# Patient Record
Sex: MALE | Race: WHITE | NOT HISPANIC OR LATINO | Employment: OTHER | URBAN - METROPOLITAN AREA
[De-identification: names, ages, dates, MRNs, and addresses within clinical notes are randomized per-mention and may not be internally consistent; named-entity substitution may affect disease eponyms.]

---

## 2017-01-04 DIAGNOSIS — F41.9 MILD ANXIETY: ICD-10-CM

## 2017-01-04 DIAGNOSIS — K21.9 GASTROESOPHAGEAL REFLUX DISEASE WITHOUT ESOPHAGITIS: ICD-10-CM

## 2017-01-06 RX ORDER — BUPROPION HYDROCHLORIDE 150 MG/1
TABLET, EXTENDED RELEASE ORAL
Qty: 180 TABLET | Refills: 0 | Status: SHIPPED | OUTPATIENT
Start: 2017-01-06 | End: 2017-04-04 | Stop reason: SDUPTHER

## 2017-01-06 RX ORDER — ESOMEPRAZOLE MAGNESIUM 20 MG
CAPSULE,DELAYED RELEASE (ENTERIC COATED) ORAL
Qty: 90 CAPSULE | Refills: 0 | Status: SHIPPED | OUTPATIENT
Start: 2017-01-06 | End: 2017-04-04 | Stop reason: SDUPTHER

## 2017-01-06 RX ORDER — AMITRIPTYLINE HYDROCHLORIDE 10 MG/1
TABLET, FILM COATED ORAL
Qty: 90 TABLET | Refills: 0 | Status: SHIPPED | OUTPATIENT
Start: 2017-01-06 | End: 2017-04-04 | Stop reason: SDUPTHER

## 2017-04-04 DIAGNOSIS — K21.9 GASTROESOPHAGEAL REFLUX DISEASE WITHOUT ESOPHAGITIS: ICD-10-CM

## 2017-04-04 DIAGNOSIS — F41.9 MILD ANXIETY: ICD-10-CM

## 2017-04-05 RX ORDER — ESOMEPRAZOLE MAGNESIUM 20 MG
CAPSULE,DELAYED RELEASE (ENTERIC COATED) ORAL
Qty: 90 CAPSULE | Refills: 3 | Status: SHIPPED | OUTPATIENT
Start: 2017-04-05

## 2017-04-05 RX ORDER — AMITRIPTYLINE HYDROCHLORIDE 10 MG/1
TABLET, FILM COATED ORAL
Qty: 90 TABLET | Refills: 3 | Status: SHIPPED | OUTPATIENT
Start: 2017-04-05 | End: 2023-10-05

## 2017-04-05 RX ORDER — BUPROPION HYDROCHLORIDE 150 MG/1
TABLET, EXTENDED RELEASE ORAL
Qty: 180 TABLET | Refills: 3 | Status: SHIPPED | OUTPATIENT
Start: 2017-04-05 | End: 2017-11-15

## 2017-11-15 ENCOUNTER — OFFICE VISIT (OUTPATIENT)
Dept: FAMILY MEDICINE | Facility: CLINIC | Age: 47
End: 2017-11-15
Payer: OTHER GOVERNMENT

## 2017-11-15 VITALS
OXYGEN SATURATION: 96 % | TEMPERATURE: 98 F | RESPIRATION RATE: 16 BRPM | HEIGHT: 72 IN | HEART RATE: 78 BPM | WEIGHT: 214.5 LBS | DIASTOLIC BLOOD PRESSURE: 74 MMHG | BODY MASS INDEX: 29.05 KG/M2 | SYSTOLIC BLOOD PRESSURE: 112 MMHG

## 2017-11-15 DIAGNOSIS — J30.2 ACUTE SEASONAL ALLERGIC RHINITIS, UNSPECIFIED TRIGGER: Primary | ICD-10-CM

## 2017-11-15 DIAGNOSIS — R05.9 COUGH: ICD-10-CM

## 2017-11-15 PROCEDURE — 99213 OFFICE O/P EST LOW 20 MIN: CPT | Mod: PBBFAC,PO | Performed by: NURSE PRACTITIONER

## 2017-11-15 PROCEDURE — 99999 PR PBB SHADOW E&M-EST. PATIENT-LVL III: CPT | Mod: PBBFAC,,, | Performed by: NURSE PRACTITIONER

## 2017-11-15 PROCEDURE — 99214 OFFICE O/P EST MOD 30 MIN: CPT | Mod: S$PBB,,, | Performed by: NURSE PRACTITIONER

## 2017-11-15 RX ORDER — FLUTICASONE PROPIONATE 50 MCG
1 SPRAY, SUSPENSION (ML) NASAL DAILY
Qty: 1 BOTTLE | Refills: 4 | Status: SHIPPED | OUTPATIENT
Start: 2017-11-15 | End: 2022-04-21

## 2017-11-15 RX ORDER — BENZONATATE 200 MG/1
200 CAPSULE ORAL 3 TIMES DAILY PRN
Qty: 30 CAPSULE | Refills: 0 | Status: SHIPPED | OUTPATIENT
Start: 2017-11-15 | End: 2017-11-25

## 2017-11-15 RX ORDER — BENZONATATE 200 MG/1
200 CAPSULE ORAL 3 TIMES DAILY PRN
Qty: 30 CAPSULE | Refills: 0 | Status: SHIPPED | OUTPATIENT
Start: 2017-11-15 | End: 2017-11-15 | Stop reason: SDUPTHER

## 2017-11-15 NOTE — PROGRESS NOTES
Subjective:       Patient ID: Raciel White is a 47 y.o. male.    Chief Complaint: Nasal Congestion; Chest Congestion; Sore Throat; Cough (productive with green sputum); and Ear Fullness (bilateral)    Mr. White is a new patient to me. He presents today with complaints of nasal congestion and cough.    Cough   This is a new problem. The current episode started in the past 7 days. The problem has been unchanged. The cough is productive of sputum. Associated symptoms include ear congestion, nasal congestion, postnasal drip and a sore throat. Pertinent negatives include no chest pain, ear pain, eye redness, fever, headaches, rash or shortness of breath. He has tried OTC cough suppressant for the symptoms. The treatment provided no relief.      Vitals:    11/15/17 0937   BP: 112/74   Pulse: 78   Resp: 16   Temp: 98.4 °F (36.9 °C)     Review of Systems   Constitutional: Negative.  Negative for diaphoresis and fever.   HENT: Positive for congestion, postnasal drip and sore throat. Negative for ear pain, facial swelling and trouble swallowing.    Eyes: Negative.  Negative for discharge and redness.   Respiratory: Positive for cough. Negative for shortness of breath.    Cardiovascular: Negative.  Negative for chest pain and palpitations.   Gastrointestinal: Negative.  Negative for nausea and vomiting.   Musculoskeletal: Negative.  Negative for back pain and neck pain.   Skin: Negative.  Negative for rash and wound.   Neurological: Negative.  Negative for dizziness and headaches.   Hematological: Negative.    Psychiatric/Behavioral: Negative.  Negative for confusion. The patient is not nervous/anxious.        Past Medical History:   Diagnosis Date    GERD (gastroesophageal reflux disease)     Insomnia     Sleep apnea     cpap     Objective:      Physical Exam   Constitutional: He is oriented to person, place, and time. He does not have a sickly appearance. No distress.   HENT:   Head: Normocephalic.   Right Ear: Hearing,  tympanic membrane, external ear and ear canal normal.   Left Ear: Hearing, tympanic membrane, external ear and ear canal normal.   Nose: Nose normal. Right sinus exhibits no maxillary sinus tenderness and no frontal sinus tenderness. Left sinus exhibits no maxillary sinus tenderness and no frontal sinus tenderness.   Mouth/Throat:       Eyes: Conjunctivae and lids are normal.   Neck: Trachea normal. No JVD present.   Cardiovascular: Normal rate, regular rhythm, S1 normal, S2 normal and normal heart sounds.    Pulmonary/Chest: Effort normal and breath sounds normal. He exhibits no tenderness.   Abdominal: Normal appearance. He exhibits no distension.   Musculoskeletal: Normal range of motion. He exhibits no edema or deformity.   Neurological: He is alert and oriented to person, place, and time.   Skin: Skin is warm and dry. He is not diaphoretic. No pallor.   Psychiatric: He has a normal mood and affect. His speech is normal and behavior is normal. Judgment and thought content normal. Cognition and memory are normal.   Nursing note and vitals reviewed.      Assessment:       1. Acute seasonal allergic rhinitis, unspecified trigger    2. Cough        Plan:       Acute seasonal allergic rhinitis, unspecified trigger  -     fluticasone (FLONASE) 50 mcg/actuation nasal spray; 1 spray by Each Nare route once daily.  Dispense: 1 Bottle; Refill: 4  - otc antihistamine daily    Cough  -     benzonatate (TESSALON) 200 MG capsule; Take 1 capsule (200 mg total) by mouth 3 (three) times daily as needed for Cough.  Dispense: 30 capsule; Refill: 0        Return if symptoms worsen or fail to improve.

## 2017-11-15 NOTE — PATIENT INSTRUCTIONS
FLONASE (fluticasone) or generic equivalent   antihistamine (zyrtec, Claritin or allegra)     You can try Delsym cough suppressant      Understanding the Cold Virus  Colds are the most common illness that people get. Most adults get 2 or 3 colds per year, and most children get 5 to 7 colds per year. Colds may be caused by over 200 types of viruses. The most common of these are rhinoviruses (rhino refers to the nose).  What causes a cold virus?  All colds start with infection by a virus. You can be infected by more than one cold virus at a time. Infection with cold viruses happens when:  · You breathe in a virus from the air. This can happen when someone with a cold sneezes or coughs near you.  · You touch your eyes, nose, or mouth when your hand has a cold virus on it. This can happen if you touch an object that has the cold virus on it.  What are the symptoms of a cold virus?  Almost all colds involve a stuffy nose. Other common symptoms include:  · Runny nose  · Sneezing  · Sore throat  · Headache  · Cough  How is a cold treated?  Colds usually last 5 to 10 days. Treatment focuses on relieving symptoms. Treatments may include:  · Decongestant medicines. Several types of decongestants are available without prescription. These may help reduce stuffy or runny nose symptoms.  · Prescription or over-the-counter nasal sprays. These may help reduce nasal symptoms, including stuffiness.  · Prescription or over-the-counter pain medicines. These can help with headaches and sore throat.  · Self-care. This includes extra rest, using humidifiers, and drinking more fluids. These help you feel better while you are getting over a cold.  Antibiotics are not helpful for a cold. They do not make a cold shorter or relieve symptoms. Taking antibiotics when you dont need them can make them work less well when you need them for another illness.  Follow all directions for using medicines, especially when giving them to  children. Contact your healthcare provider if you have any questions about using cold medicines safely.  Can a cold be prevented?  You can help reduce the spread of cold viruses. This can help both you and others avoid getting colds. Follow these tips:  · Wash your hands well anytime you may have come into contact with cold viruses. Wash your hands for at least 20 seconds. When you cant wash with soap and water, use an alcohol-based hand .  · Dont touch your nose, eyes, or mouth, especially after touching something that may have a cold virus on it.  · Cover your mouth and nose when you cough or sneeze. Throw away tissues after using them.  · Disinfect things you touch often, such as phones and keyboards.    · Stay home when you have a cold.  What are the possible complications of a cold virus?  Colds usually go away by themselves. But its not unusual to get another type of infection while you have a cold. These can include:  · Sinus infection  · Lung infection, such as bronchitis or pneumonia  · Ear infection  If you have asthma or chronic bronchitis, a cold can make your condition worse.     When should I call my healthcare provider?  Call your healthcare provider right away if you have any of these:  · Fever of 100.4°F (38°C) or higher, or as directed  · Cough, chest pain, or shortness of breath that gets worse  · Symptoms dont get better or get worse after about 10 days  · Headache, sleepiness, or confusion that gets worse   Date Last Reviewed: 3/28/2016  © 3618-5834 [employer], [employer] ([employer]). [address] [place] [address], [place] [address]. All rights reserved. This information is not intended as a substitute for professional medical care. Always follow your healthcare professional's instructions.

## 2017-12-19 ENCOUNTER — TELEPHONE (OUTPATIENT)
Dept: OPTOMETRY | Facility: CLINIC | Age: 47
End: 2017-12-19

## 2017-12-19 NOTE — TELEPHONE ENCOUNTER
Returned pt call. Left VM that he can get records at [email or web address] or by contacting the     ATTN: Release of Information [facility] [address] [place] [address]   Phone: [phone] Fax: [phone]     ----- Message from Angy Pratt sent at 12/19/2017 10:55 AM CST -----  Contact: Patient  States that he would like a copy of the results of his eye exam which was done back 11/04/2015.  Can you please call him back at [phone].  Thank you

## 2017-12-20 ENCOUNTER — OFFICE VISIT (OUTPATIENT)
Dept: OPTOMETRY | Facility: CLINIC | Age: 47
End: 2017-12-20
Payer: OTHER GOVERNMENT

## 2017-12-20 DIAGNOSIS — Z01.00 EXAMINATION OF EYES AND VISION: Primary | ICD-10-CM

## 2017-12-20 DIAGNOSIS — H52.4 MYOPIA WITH ASTIGMATISM AND PRESBYOPIA, BILATERAL: ICD-10-CM

## 2017-12-20 DIAGNOSIS — H52.203 MYOPIA WITH ASTIGMATISM AND PRESBYOPIA, BILATERAL: ICD-10-CM

## 2017-12-20 DIAGNOSIS — H52.13 MYOPIA WITH ASTIGMATISM AND PRESBYOPIA, BILATERAL: ICD-10-CM

## 2017-12-20 DIAGNOSIS — H43.393 VITREOUS FLOATERS, BILATERAL: ICD-10-CM

## 2017-12-20 PROCEDURE — 92014 COMPRE OPH EXAM EST PT 1/>: CPT | Mod: S$PBB,,, | Performed by: OPTOMETRIST

## 2017-12-20 PROCEDURE — 92015 DETERMINE REFRACTIVE STATE: CPT | Mod: ,,, | Performed by: OPTOMETRIST

## 2017-12-20 PROCEDURE — 99999 PR PBB SHADOW E&M-EST. PATIENT-LVL III: CPT | Mod: PBBFAC,,, | Performed by: OPTOMETRIST

## 2017-12-20 PROCEDURE — 99213 OFFICE O/P EST LOW 20 MIN: CPT | Mod: PBBFAC,PO | Performed by: OPTOMETRIST

## 2017-12-20 NOTE — PROGRESS NOTES
HPI     Presenting Complaint: Pt her today for yearly ocular health check, DLE:   11/2015  Pt states distance vision has been stable. Pt has noticed over th elast   year near vision has been getting worse.     Ophthalmic medication / drops:None    (-) headaches  (-) diplopia   (-) flashes / (-) floaters      Last edited by Jacobo Rowe on 12/20/2017  1:45 PM. (History)        ROS     Positive for: Eyes    Negative for: Constitutional, Gastrointestinal, Neurological, Skin,   Genitourinary, Musculoskeletal, HENT, Endocrine, Cardiovascular,   Respiratory, Psychiatric, Allergic/Imm, Heme/Lymph    Last edited by CARLOZ Matthews, OD on 12/20/2017  1:52 PM. (History)        Assessment /Plan     For exam results, see Encounter Report.    Examination of eyes and vision    Vitreous floaters, bilateral    Myopia with astigmatism and presbyopia, bilateral      Ocular health wnl, OU  Mild floaters OU, RD precautions given  Updated specs rx, gave copy--discussed PAL vs FT    Discussed and educated patient on current findings /plan.  RTC 1 year, prn if any changes / issues

## 2018-06-08 DIAGNOSIS — F41.9 MILD ANXIETY: ICD-10-CM

## 2018-06-08 RX ORDER — AMITRIPTYLINE HYDROCHLORIDE 10 MG/1
TABLET, FILM COATED ORAL
Qty: 90 TABLET | Refills: 3 | OUTPATIENT
Start: 2018-06-08

## 2018-06-08 RX ORDER — BUPROPION HYDROCHLORIDE 150 MG/1
TABLET, EXTENDED RELEASE ORAL
Qty: 180 TABLET | Refills: 3 | OUTPATIENT
Start: 2018-06-08

## 2018-06-26 ENCOUNTER — TELEPHONE (OUTPATIENT)
Dept: FAMILY MEDICINE | Facility: CLINIC | Age: 48
End: 2018-06-26

## 2018-06-26 DIAGNOSIS — G47.33 OSA (OBSTRUCTIVE SLEEP APNEA): Primary | ICD-10-CM

## 2018-06-26 NOTE — TELEPHONE ENCOUNTER
----- Message from Liliya Orellana sent at 6/26/2018  8:44 AM CDT -----  Contact: Patient  Type: Needs Medical Advice    Who Called:  Patient  Symptoms (please be specific): na   How long has patient had these symptoms: na  Pharmacy name and phone #:  [facility]  Best Call Back Number:   Additional Information: Calling to request a referral to see an ENT Doctor. The patient would like for the Nurse to call him back to schedule the ENT appt. Please advise.

## 2018-06-26 NOTE — TELEPHONE ENCOUNTER
Pt would like referral to ENT.  He states he saw sleep clinic and was given a cpap but he doesn't feel this is working and he has researched that the next step is a prosthesis device which is to be done with ENT.

## 2018-06-29 ENCOUNTER — OFFICE VISIT (OUTPATIENT)
Dept: OTOLARYNGOLOGY | Facility: CLINIC | Age: 48
End: 2018-06-29
Payer: OTHER GOVERNMENT

## 2018-06-29 VITALS — WEIGHT: 207.44 LBS | HEIGHT: 72 IN | BODY MASS INDEX: 28.1 KG/M2

## 2018-06-29 DIAGNOSIS — G47.33 OSA (OBSTRUCTIVE SLEEP APNEA): Primary | ICD-10-CM

## 2018-06-29 PROCEDURE — 99204 OFFICE O/P NEW MOD 45 MIN: CPT | Mod: S$PBB,,, | Performed by: OTOLARYNGOLOGY

## 2018-06-29 PROCEDURE — 99999 PR PBB SHADOW E&M-EST. PATIENT-LVL III: CPT | Mod: PBBFAC,,, | Performed by: OTOLARYNGOLOGY

## 2018-06-29 PROCEDURE — 99213 OFFICE O/P EST LOW 20 MIN: CPT | Mod: PBBFAC,PO | Performed by: OTOLARYNGOLOGY

## 2018-06-29 NOTE — LETTER
June 30, 2018      Camron Wong MD  [address] [facility] [address] [place] [address]           [place] [facility]  [address] [facility] [address] [place] [address]  Phone: [phone]  Fax: [phone]          Patient: Raciel White   MR Number: [id number]   YOB: 1970   Date of Visit: 6/29/2018       Dear Dr. Camron Wong:    Thank you for referring Raciel White to me for evaluation. Attached you will find relevant portions of my assessment and plan of care.    If you have questions, please do not hesitate to call me. I look forward to following Raciel White along with you.    Sincerely,    Brandin Rosen MD    Enclosure  CC:  No Recipients    If you would like to receive this communication electronically, please contact [email or web address] or [phone] to request more information on [facility] access.    For providers and/or their staff who would like to refer a patient to [facility], please contact us through our one-stop-shop provider referral line, [facility], at [phone].    If you feel you have received this communication in error or would no longer like to receive these types of communications, please e-mail [email or web address]

## 2018-06-29 NOTE — PROGRESS NOTES
Subjective:       Patient ID: Raciel White is a 47 y.o. male.    Chief Complaint: discuss sleep apnea surgery    Raciel is here for ROMA issues and intolerance of CPAP. Symptoms have been present for many years. He was followed by Dr. Yo and this was who did his PSG. He is intolerant of his CPAP - he has tried different masks without improvement. His Snoring is his biggest complaint and this is affecting he and his partner's sleep quality. He has tried OTC mouth devices but this has resulted in change to his dentition.   He had nasal surgery in 2000 with minimal improvement to sleep. He has allergies, but otherwise breathing OK through his nose. He has not changed with regards to his weight.   Last PSG 2009, results showed mixed sleep apnea with a fairly large central? Component according to interpretation.   BMI 28       History   Smoking Status    Current Every Day Smoker    Packs/day: 0.50    Types: Vaping with nicotine   Smokeless Tobacco    Former User     History   Alcohol Use    7.2 oz/week    12 Cans of beer per week     Comment: weekly          Review of Systems   Constitutional: Negative for activity change and appetite change.   Eyes: Negative for discharge.   Respiratory: Negative for difficulty breathing and wheezing   Cardiovascular: Negative for chest pain.   Gastrointestinal: Negative for abdominal distention and abdominal pain.   Endocrine: Negative for cold intolerance and heat intolerance.   Genitourinary: Negative for dysuria.   Musculoskeletal: Negative for gait problem and joint swelling.   Skin: Negative for color change and pallor.   Neurological: Negative for syncope and weakness.   Psychiatric/Behavioral: Negative for agitation and confusion.     Objective:        Constitutional:   He is oriented to person, place, and time. He appears well-developed and well-nourished. He appears alert. He is active. Normal speech.      Head:  Normocephalic and atraumatic. Head is without TMJ  tenderness. No scars. Salivary glands normal.  Facial strength is normal.    Normal neck circumference  Body mass index is 28.14 kg/m².      Ears:    Right Ear: No drainage or swelling. No middle ear effusion.   Left Ear: No drainage or swelling.  No middle ear effusion.     Nose:  Septal deviation (mild) present. No mucosal edema, rhinorrhea or sinus tenderness. No turbinate hypertrophy.      Mouth/Throat  Oropharynx clear and moist without lesions or asymmetry, normal uvula midline and mirror exam normal. Normal dentition. No uvula swelling, lacerations or trismus. No oropharyngeal exudate. Tonsillar erythema, tonsillar exudate.    Aidee tongue/palate I  Regressed tonsils, high/non-elongated palate  Strong gag  Normal tongue size with no retrognathia      Neck:  Full range of motion with neck supple and no adenopathy. Thyroid tenderness is present. No tracheal deviation, no edema, no erythema, normal range of motion, no stridor, no crepitus and no neck rigidity present. No thyroid mass present.     Cardiovascular:   Intact distal pulses and normal pulses.      Pulmonary/Chest:   Effort normal and breath sounds normal. No stridor.     Psychiatric:   His speech is normal and behavior is normal. His mood appears not anxious. His affect is not labile.     Neurological:   He is alert and oriented to person, place, and time. No sensory deficit.     Skin:   No abrasions, lacerations, lesions, or rashes. No abrasion and no bruising noted.         Tests / Results:   I personally reviewed the PSG from 2012 and my findings reveal:   Position: The patient spent the entire night in the supine positions.    Sleep Latency: The sleep onset latency after lights out was 3.5 minutes. The REM sleep latency from sleep onset was 45.0 minutes.    Total Sleep Time: The total time in bed was 444.0 minutes with a total sleep time of 321.0 minutes. Sleep efficiency was 72.3%.    All Night Sleep Architecture: During the entire night, the  patient had 33.0 minutes of REM for 10.3% of TST, 64.0 minutes of N1 for 19.9% of TST, 131.5 minutes of N 2 for 41.0% of TST, and  92.5 minutes of N 3 for 28.8% of TST. The arousal index was 33.3 per hour of sleep.    Respiratory: The patient was observed to have had a total number of 45 respiratory events with an RDI of 8.4 per hour. The REM RDI was 7.3. The normal RDI is less than 5 per hour. There  were 1 obstructive apnea, 0 mixed apneas, 42 central apneas, 2 hypopneas, and 68 RERA's. Snoring was eliminated with CPAP.    Oxygenation: The patient had an average oxygen saturation of 96.5%. The minimum oxygen level was 91.1%.    CPAP Pressures: CPAP was initiated after ROMA was noted on a pressure of 4.0 cmH2O.  CPAP pressure was increased to a maximum level of 14.0 cmH2O. CFlex was set to 3.  Humidity was set to 3.  Entire Night Cardiac and EMG Data  Cardiac: The average heart rate was 88.5 beats per minute.    Other/EMG: Patient had evidence of periodic leg movements during sleep.PLM index 20.4 per hour. PLM Arousal Index 3.4 per hour.  See prior Polysomnogram for other impressions and recommendations.    Impression:    Mild Obstructive Sleep Apnea Syndrome with hypersomnia and mild intermittent snoring with optimal therapeutic pressure of CPAP 10 cmwp/CFLEX 3/Humidity 3/ Small Nasal Cannulas  with good tolerance. (Significant Central apneas occures at 14 cmwp)  Periodic limb movements of sleep 20 per hour but with arousal index 3 per hour       Assessment:       1. ROMA (obstructive sleep apnea)          Plan:         Mild ROMA also with component of central apneas? Oxygen remained elevated during study. Failed CPAP. Primary complaint is snoring. In order to target appropriate management for patient, recommend updated PSG and counseling with the sleep disorders clinic.  FU 1 month and we will discuss options after new study has been obtained.

## 2018-08-13 ENCOUNTER — OFFICE VISIT (OUTPATIENT)
Dept: OTOLARYNGOLOGY | Facility: CLINIC | Age: 48
End: 2018-08-13
Payer: OTHER GOVERNMENT

## 2018-08-13 ENCOUNTER — TELEPHONE (OUTPATIENT)
Dept: OTOLARYNGOLOGY | Facility: CLINIC | Age: 48
End: 2018-08-13

## 2018-08-13 VITALS — BODY MASS INDEX: 28.48 KG/M2 | HEIGHT: 72 IN | WEIGHT: 210.31 LBS

## 2018-08-13 DIAGNOSIS — G47.33 OSA (OBSTRUCTIVE SLEEP APNEA): Primary | ICD-10-CM

## 2018-08-13 PROCEDURE — 99213 OFFICE O/P EST LOW 20 MIN: CPT | Mod: PBBFAC,PO | Performed by: OTOLARYNGOLOGY

## 2018-08-13 PROCEDURE — 99214 OFFICE O/P EST MOD 30 MIN: CPT | Mod: S$PBB,,, | Performed by: OTOLARYNGOLOGY

## 2018-08-13 PROCEDURE — 99999 PR PBB SHADOW E&M-EST. PATIENT-LVL III: CPT | Mod: PBBFAC,,, | Performed by: OTOLARYNGOLOGY

## 2018-08-13 NOTE — TELEPHONE ENCOUNTER
----- Message from Brandin Rosen MD sent at 8/13/2018 12:38 PM CDT -----  Please let me know where he decides to have surgery so I can put in the request and let the rep know.

## 2018-08-13 NOTE — Clinical Note
Please let me know where he decides to have surgery so I can put in the request and let the rep know.

## 2018-08-13 NOTE — PROGRESS NOTES
Subjective:       Patient ID: Raciel White is a 47 y.o. male.    Chief Complaint: Follow-up    Raciel is here for follow-up of severe ROMA. He had repeat sleep study since last visit which showed severe ROMA, AHI 34, desat to 81%. No other changes. He is intolerant of CPAP and has tried multiple masks. He has had nasal surgery without improvement in the past. He is interested in additional options for his sleep apnea.    He is using tobacco.     Review of Systems   Constitutional: Negative for activity change and appetite change.   Respiratory: Negative for difficulty breathing and wheezing   Cardiovascular: Negative for chest pain.      Objective:        Constitutional:   Vital signs are normal. He appears well-developed and well-nourished.     Head:  Normocephalic and atraumatic.     Ears:  Hearing normal to normal and whispered voice; external ear normal without scars, lesions, or masses; ear canal, tympanic membrane, and middle ear normal..     Nose:  Nose normal including turbinates, nasal mucosa, sinuses and nasal septum.     Mouth/Throat  Lips, teeth, and gums normal and oropharynx normal.   Pavon tongue I  Normal BOT size      Neck:  Neck normal without thyromegaly masses, asymmetry, normal tracheal structure, crepitus, and tenderness.         Tests / Results:  Reviewed PSG:  AHI 34  Desat to 81%  Slept for 7.5 hours    Assessment:       1. ROMA (obstructive sleep apnea)          Plan:       I have discussed the options for surgical treatment of ROMA  He is a little hesitant at this point about neurostimulation. He would like to proceed with Hyoid suspension.   Tobacco cessation.    I discussed risks including scar, persistent ROMA requiring CPAP, infection, seroma, hematoma, dysphagia, need for further procedures, or need for removal of hardware.

## 2018-08-16 NOTE — TELEPHONE ENCOUNTER
New date September 27 please let me know if the rep can do this date .  I will call the patient to after lined up with the rep.  Thanks Jessica

## 2018-09-05 ENCOUNTER — TELEPHONE (OUTPATIENT)
Dept: OTOLARYNGOLOGY | Facility: CLINIC | Age: 48
End: 2018-09-05

## 2018-09-05 NOTE — TELEPHONE ENCOUNTER
Spoke with pt that has questions about his upcoming surgery. Pt is asking if he can get his tonsils removed since Dr. Rosen will already be in there. Please call pt and discuss. Thanks

## 2018-09-05 NOTE — TELEPHONE ENCOUNTER
----- Message from Johana Curtis sent at 9/5/2018 11:38 AM CDT -----  Contact: patient  Type: Needs Medical Advice    Who Called:  Patient  Symptoms (please be specific):    How long has patient had these symptoms:    Pharmacy name and phone #:    Best Call Back Number: [phone]  Additional Information: requesting a call back have additional questions

## 2018-09-05 NOTE — TELEPHONE ENCOUNTER
All questions answered. Pt advised to call [facility] pre-admission for co-pay amount. Pt verbalized understanding.

## 2018-09-05 NOTE — TELEPHONE ENCOUNTER
----- Message from Juliet Rivera sent at 9/5/2018 10:29 AM CDT -----  Contact: self  Patient would like to discuss up coming appointment. Please call patient at [phone]. Thanks!

## 2018-09-25 ENCOUNTER — TELEPHONE (OUTPATIENT)
Dept: OTOLARYNGOLOGY | Facility: CLINIC | Age: 48
End: 2018-09-25

## 2018-09-25 NOTE — TELEPHONE ENCOUNTER
Light activity through the weekend. After 7-10 days can start increasing activity, but avoid heavy lifting for 10 days. Patient stated he understood.

## 2018-09-25 NOTE — TELEPHONE ENCOUNTER
----- Message from Jackie Curiel sent at 9/25/2018 10:59 AM CDT -----  Contact: self  Needs info on procedure that is scheduled. Please call back at [phone] (ssrq)

## 2018-09-25 NOTE — TELEPHONE ENCOUNTER
----- Message from Cory Farnsworth sent at 9/25/2018 12:54 PM CDT -----  Contact: self   Type:  Patient Returning Call    Who Called: patient   Who Left Message for Patient: Jessica   Does the patient know what this is regarding?: n/a  Best Call Back Number:  [phone] (home)

## 2018-09-26 ENCOUNTER — TELEPHONE (OUTPATIENT)
Dept: OTOLARYNGOLOGY | Facility: CLINIC | Age: 48
End: 2018-09-26

## 2018-09-26 NOTE — TELEPHONE ENCOUNTER
----- Message from Monse Kendall sent at 9/26/2018  1:52 PM CDT -----  Type:  Patient Returning Call    Who Called:  Patient  Who Left Message for Patient:  No  Does the patient know what this is regarding?:  No  Best Call Back Number:  [phone]

## 2018-10-01 ENCOUNTER — TELEPHONE (OUTPATIENT)
Dept: OTOLARYNGOLOGY | Facility: CLINIC | Age: 48
End: 2018-10-01

## 2018-10-08 ENCOUNTER — OFFICE VISIT (OUTPATIENT)
Dept: OTOLARYNGOLOGY | Facility: CLINIC | Age: 48
End: 2018-10-08
Payer: OTHER GOVERNMENT

## 2018-10-08 VITALS — BODY MASS INDEX: 28.9 KG/M2 | WEIGHT: 213.38 LBS | HEIGHT: 72 IN

## 2018-10-08 DIAGNOSIS — G47.33 OSA (OBSTRUCTIVE SLEEP APNEA): Primary | ICD-10-CM

## 2018-10-08 DIAGNOSIS — R06.83 SNORING: ICD-10-CM

## 2018-10-08 PROCEDURE — 99024 POSTOP FOLLOW-UP VISIT: CPT | Mod: ,,, | Performed by: OTOLARYNGOLOGY

## 2018-10-08 PROCEDURE — 99999 PR PBB SHADOW E&M-EST. PATIENT-LVL III: CPT | Mod: PBBFAC,,, | Performed by: OTOLARYNGOLOGY

## 2018-10-08 PROCEDURE — 99213 OFFICE O/P EST LOW 20 MIN: CPT | Mod: PBBFAC,PO | Performed by: OTOLARYNGOLOGY

## 2018-10-09 NOTE — PROGRESS NOTES
Raciel is here for a post-operative visit following hyoid suspension    More refreshing sleep  Apparent improvement in apneas, per wife  Sleeping more restfully  Still snoring, and this is very bothersome to he and his wife  Feels snoring is more pronounced when he is breathing through nose at night.   Has tried dental appliances in the past without improvement    Exam:  Alert, active, appropriate  Mild firmness of inferior incision c/w expected scarring, healing well overall  No palate or uvula redundancy   Septum straight, patent nasal cavity    Healing well  Doing well from ROMA perspective based on history. Would repeat PSG in 6-8 weeks.   He is still very bothered by snoring. His oral cavity, oropharynx exam is otherwise normal appearing. Discussed could be soft palate but not elongated.   He is very disturbed by snoring still as is his partner. I would prefer observation for now, but he would like to look into anything. Discussed sleep endoscopy to try and pinpoint area of snoring and he would like to move forward.

## 2018-10-16 ENCOUNTER — TELEPHONE (OUTPATIENT)
Dept: OTOLARYNGOLOGY | Facility: CLINIC | Age: 48
End: 2018-10-16

## 2018-10-16 NOTE — TELEPHONE ENCOUNTER
----- Message from Iwona Barbosa sent at 10/16/2018  1:58 PM CDT -----  Type: Needs Medical Advice    Who Called:  Patient   Symptoms (please be specific):  n/a  How long has patient had these symptoms:  n/a  Pharmacy name and phone #:  n/a  Best Call Back Number: patient hung up before phone number could be verified  Additional Information: contact patient to reschedule his 10/19/18 appointment    Thank you

## 2018-10-18 ENCOUNTER — ANESTHESIA EVENT (OUTPATIENT)
Dept: SURGERY | Facility: HOSPITAL | Age: 48
End: 2018-10-18
Payer: OTHER GOVERNMENT

## 2018-10-18 ENCOUNTER — TELEPHONE (OUTPATIENT)
Dept: OTOLARYNGOLOGY | Facility: CLINIC | Age: 48
End: 2018-10-18

## 2018-10-18 NOTE — TELEPHONE ENCOUNTER
----- Message from Brandin Rosen MD sent at 10/17/2018  2:23 PM CDT -----  Addison Schrader needs to move his surgery from Friday. He texted me. Can you call him to pick a new date. Thank you.

## 2018-10-19 ENCOUNTER — ANESTHESIA (OUTPATIENT)
Dept: SURGERY | Facility: HOSPITAL | Age: 48
End: 2018-10-19
Payer: OTHER GOVERNMENT

## 2018-10-30 RX ORDER — NAPROXEN SODIUM 220 MG
220 TABLET ORAL
COMMUNITY
End: 2020-09-14

## 2018-11-01 RX ORDER — LIDOCAINE HYDROCHLORIDE 10 MG/ML
1 INJECTION, SOLUTION EPIDURAL; INFILTRATION; INTRACAUDAL; PERINEURAL ONCE
Status: CANCELLED | OUTPATIENT
Start: 2018-11-01 | End: 2018-11-01

## 2018-11-02 ENCOUNTER — HOSPITAL ENCOUNTER (OUTPATIENT)
Facility: HOSPITAL | Age: 48
Discharge: HOME | End: 2018-11-02
Attending: OTOLARYNGOLOGY | Admitting: OTOLARYNGOLOGY
Payer: OTHER GOVERNMENT

## 2018-11-02 VITALS
HEART RATE: 70 BPM | DIASTOLIC BLOOD PRESSURE: 86 MMHG | BODY MASS INDEX: 27.09 KG/M2 | HEIGHT: 72 IN | TEMPERATURE: 98 F | RESPIRATION RATE: 16 BRPM | OXYGEN SATURATION: 98 % | SYSTOLIC BLOOD PRESSURE: 136 MMHG | WEIGHT: 200 LBS

## 2018-11-02 DIAGNOSIS — R06.83 SNORING: Primary | ICD-10-CM

## 2018-11-02 DIAGNOSIS — G47.33 OSA (OBSTRUCTIVE SLEEP APNEA): ICD-10-CM

## 2018-11-02 PROCEDURE — 25000003 PHARM REV CODE 250: Mod: PO | Performed by: NURSE ANESTHETIST, CERTIFIED REGISTERED

## 2018-11-02 PROCEDURE — D9220A PRA ANESTHESIA: Mod: ANES,,, | Performed by: ANESTHESIOLOGY

## 2018-11-02 PROCEDURE — 71000015 HC POSTOP RECOV 1ST HR: Mod: PO | Performed by: OTOLARYNGOLOGY

## 2018-11-02 PROCEDURE — 63600175 PHARM REV CODE 636 W HCPCS: Mod: PO | Performed by: NURSE ANESTHETIST, CERTIFIED REGISTERED

## 2018-11-02 PROCEDURE — 00326 ANES ALL PX LARYNX&TRACH<1YR: CPT | Mod: PO | Performed by: OTOLARYNGOLOGY

## 2018-11-02 PROCEDURE — 25000003 PHARM REV CODE 250: Mod: PO | Performed by: ANESTHESIOLOGY

## 2018-11-02 PROCEDURE — 31575 DIAGNOSTIC LARYNGOSCOPY: CPT | Mod: 22,79,, | Performed by: OTOLARYNGOLOGY

## 2018-11-02 PROCEDURE — 63600175 PHARM REV CODE 636 W HCPCS: Mod: PO | Performed by: ANESTHESIOLOGY

## 2018-11-02 PROCEDURE — 36000709 HC OR TIME LEV III EA ADD 15 MIN: Mod: PO | Performed by: OTOLARYNGOLOGY

## 2018-11-02 PROCEDURE — 36000708 HC OR TIME LEV III 1ST 15 MIN: Mod: PO | Performed by: OTOLARYNGOLOGY

## 2018-11-02 PROCEDURE — 25000003 PHARM REV CODE 250: Mod: PO | Performed by: OTOLARYNGOLOGY

## 2018-11-02 PROCEDURE — D9220A PRA ANESTHESIA: Mod: CRNA,,, | Performed by: NURSE ANESTHETIST, CERTIFIED REGISTERED

## 2018-11-02 PROCEDURE — 71000033 HC RECOVERY, INTIAL HOUR: Mod: PO | Performed by: OTOLARYNGOLOGY

## 2018-11-02 PROCEDURE — 37000008 HC ANESTHESIA 1ST 15 MINUTES: Mod: PO | Performed by: OTOLARYNGOLOGY

## 2018-11-02 PROCEDURE — 37000009 HC ANESTHESIA EA ADD 15 MINS: Mod: PO | Performed by: OTOLARYNGOLOGY

## 2018-11-02 RX ORDER — LIDOCAINE HYDROCHLORIDE 20 MG/ML
JELLY TOPICAL
Status: DISCONTINUED | OUTPATIENT
Start: 2018-11-02 | End: 2018-11-02 | Stop reason: HOSPADM

## 2018-11-02 RX ORDER — ONDANSETRON 2 MG/ML
4 INJECTION INTRAMUSCULAR; INTRAVENOUS DAILY PRN
Status: DISCONTINUED | OUTPATIENT
Start: 2018-11-02 | End: 2018-11-02 | Stop reason: HOSPADM

## 2018-11-02 RX ORDER — OXYMETAZOLINE HCL 0.05 %
2 SPRAY, NON-AEROSOL (ML) NASAL
Status: COMPLETED | OUTPATIENT
Start: 2018-11-02 | End: 2018-11-02

## 2018-11-02 RX ORDER — LIDOCAINE HYDROCHLORIDE 10 MG/ML
1 INJECTION, SOLUTION EPIDURAL; INFILTRATION; INTRACAUDAL; PERINEURAL ONCE
Status: COMPLETED | OUTPATIENT
Start: 2018-11-02 | End: 2018-11-02

## 2018-11-02 RX ORDER — FENTANYL CITRATE 50 UG/ML
25 INJECTION, SOLUTION INTRAMUSCULAR; INTRAVENOUS EVERY 5 MIN PRN
Status: DISCONTINUED | OUTPATIENT
Start: 2018-11-02 | End: 2018-11-02 | Stop reason: HOSPADM

## 2018-11-02 RX ORDER — DEXAMETHASONE SODIUM PHOSPHATE 4 MG/ML
8 INJECTION, SOLUTION INTRA-ARTICULAR; INTRALESIONAL; INTRAMUSCULAR; INTRAVENOUS; SOFT TISSUE
Status: COMPLETED | OUTPATIENT
Start: 2018-11-02 | End: 2018-11-02

## 2018-11-02 RX ORDER — LIDOCAINE HYDROCHLORIDE 20 MG/ML
JELLY TOPICAL ONCE
Status: DISCONTINUED | OUTPATIENT
Start: 2018-11-02 | End: 2018-11-02 | Stop reason: HOSPADM

## 2018-11-02 RX ORDER — PROPOFOL 10 MG/ML
VIAL (ML) INTRAVENOUS CONTINUOUS PRN
Status: DISCONTINUED | OUTPATIENT
Start: 2018-11-02 | End: 2018-11-02

## 2018-11-02 RX ORDER — OXYCODONE HYDROCHLORIDE 5 MG/1
5 TABLET ORAL
Status: DISCONTINUED | OUTPATIENT
Start: 2018-11-02 | End: 2018-11-02 | Stop reason: HOSPADM

## 2018-11-02 RX ORDER — LIDOCAINE HCL/PF 100 MG/5ML
SYRINGE (ML) INTRAVENOUS
Status: DISCONTINUED | OUTPATIENT
Start: 2018-11-02 | End: 2018-11-02

## 2018-11-02 RX ORDER — SODIUM CHLORIDE 0.9 % (FLUSH) 0.9 %
3 SYRINGE (ML) INJECTION
Status: DISCONTINUED | OUTPATIENT
Start: 2018-11-02 | End: 2018-11-02 | Stop reason: HOSPADM

## 2018-11-02 RX ORDER — SODIUM CHLORIDE, SODIUM LACTATE, POTASSIUM CHLORIDE, CALCIUM CHLORIDE 600; 310; 30; 20 MG/100ML; MG/100ML; MG/100ML; MG/100ML
INJECTION, SOLUTION INTRAVENOUS CONTINUOUS
Status: DISCONTINUED | OUTPATIENT
Start: 2018-11-02 | End: 2018-11-02 | Stop reason: HOSPADM

## 2018-11-02 RX ORDER — GLYCOPYRROLATE 0.2 MG/ML
INJECTION INTRAMUSCULAR; INTRAVENOUS
Status: DISCONTINUED | OUTPATIENT
Start: 2018-11-02 | End: 2018-11-02

## 2018-11-02 RX ADMIN — GLYCOPYRROLATE 0.2 MG: 0.2 INJECTION, SOLUTION INTRAMUSCULAR; INTRAVENOUS at 08:11

## 2018-11-02 RX ADMIN — OXYMETAZOLINE HYDROCHLORIDE 2 SPRAY: 5 SPRAY NASAL at 07:11

## 2018-11-02 RX ADMIN — LIDOCAINE HYDROCHLORIDE 50 MG: 20 INJECTION PARENTERAL at 08:11

## 2018-11-02 RX ADMIN — LIDOCAINE HYDROCHLORIDE 1 MG: 10 INJECTION, SOLUTION EPIDURAL; INFILTRATION; INTRACAUDAL; PERINEURAL at 08:11

## 2018-11-02 RX ADMIN — DEXAMETHASONE SODIUM PHOSPHATE 8 MG: 4 INJECTION, SOLUTION INTRAMUSCULAR; INTRAVENOUS at 08:11

## 2018-11-02 RX ADMIN — SODIUM CHLORIDE, SODIUM LACTATE, POTASSIUM CHLORIDE, AND CALCIUM CHLORIDE: .6; .31; .03; .02 INJECTION, SOLUTION INTRAVENOUS at 08:11

## 2018-11-02 RX ADMIN — OXYMETAZOLINE HYDROCHLORIDE 2 SPRAY: 5 SPRAY NASAL at 08:11

## 2018-11-02 RX ADMIN — PROPOFOL 50 MCG/KG/MIN: 10 INJECTION, EMULSION INTRAVENOUS at 08:11

## 2018-11-02 NOTE — ANESTHESIA PREPROCEDURE EVALUATION
11/02/2018  Raciel White is a 47 y.o., male.    Anesthesia Evaluation    I have reviewed the Patient Summary Reports.    I have reviewed the Nursing Notes.      Review of Systems  Anesthesia Hx:  No problems with previous Anesthesia    Pulmonary:   Sleep Apnea, CPAP    Hepatic/GI:   GERD, well controlled        Physical Exam  General:  Well nourished    Airway/Jaw/Neck:  Airway Findings: Mallampati: II                Anesthesia Plan  Type of Anesthesia, risks & benefits discussed:  Anesthesia Type:  general, MAC  Patient's Preference:   Intra-op Monitoring Plan:   Intra-op Monitoring Plan Comments:   Post Op Pain Control Plan:   Post Op Pain Control Plan Comments:   Induction:   IV  Beta Blocker:  Patient is not currently on a Beta-Blocker (No further documentation required).       Informed Consent: Patient understands risks and agrees with Anesthesia plan.  Questions answered. Anesthesia consent signed with patient.  ASA Score: 2     Day of Surgery Review of History & Physical:    H&P update referred to the surgeon.         Ready For Surgery From Anesthesia Perspective.

## 2018-11-02 NOTE — BRIEF OP NOTE
[facility]  Brief Operative Note     SUMMARY     Surgery Date: 11/2/2018     Surgeon(s) and Role:     * Brandin Rosen MD - Primary    Assisting Surgeon: None    Pre-op Diagnosis:  Snoring [R06.83]  ROMA (obstructive sleep apnea) [G47.33]    Post-op Diagnosis:  Post-Op Diagnosis Codes:     * Snoring [R06.83]     * ROMA (obstructive sleep apnea) [G47.33]    Procedure(s) (LRB):  Sleep endoscopy (Bilateral)    Anesthesia: General    Description of the findings of the procedure: sleep endo    Findings/Key Components: sleep endo    Estimated Blood Loss: * No values recorded between 11/2/2018  8:42 AM and 11/2/2018  9:11 AM *         Specimens:   Specimen (12h ago, onward)    None          Discharge Note    SUMMARY     Admit Date: 11/2/2018    Discharge Date and Time:  11/02/2018 9:11 AM    Hospital Course (synopsis of major diagnoses, care, treatment, and services provided during the course of the hospital stay): Did well following surgery and was discharged uneventfully     Final Diagnosis: Post-Op Diagnosis Codes:     * Snoring [R06.83]     * ROMA (obstructive sleep apnea) [G47.33]    Disposition: Home or Self Care    Follow Up/Patient Instructions: Regular diet, Follow-up with phone call. Activity light    Medications:  Reconciled Home Medications:   Current Discharge Medication List      CONTINUE these medications which have NOT CHANGED    Details   acetaminophen (TYLENOL) 325 MG tablet Take 2 tablets (650 mg total) by mouth every 4 (four) hours as needed.  Refills: 0      multivitamin capsule Take 1 capsule by mouth once daily.      naproxen sodium (ANAPROX) 220 MG tablet Take 220 mg by mouth every 12 (twelve) hours.      amitriptyline (ELAVIL) 10 MG tablet TAKE 1 TABLET NIGHTLY AS NEEDED  Qty: 90 tablet, Refills: 3    Associated Diagnoses: Mild anxiety      cyclobenzaprine (FLEXERIL) 10 MG tablet Take 1 tablet (10 mg total) by mouth 3 (three) times daily as needed.  Qty: 60 tablet, Refills: 1     Associated Diagnoses: Chronic lumbar pain      fluticasone (FLONASE) 50 mcg/actuation nasal spray 1 spray by Each Nare route once daily.  Qty: 1 Bottle, Refills: 4    Associated Diagnoses: Acute seasonal allergic rhinitis, unspecified trigger      NEXIUM 20 mg capsule TAKE 1 CAPSULE DAILY  Qty: 90 capsule, Refills: 3    Associated Diagnoses: Gastroesophageal reflux disease without esophagitis           No discharge procedures on file.

## 2018-11-02 NOTE — ANESTHESIA POSTPROCEDURE EVALUATION
Anesthesia Post Evaluation    Patient: Raciel White    Procedure(s) Performed: Procedure(s) (LRB):  Sleep endoscopy (Bilateral)    Final Anesthesia Type: general  Patient location during evaluation: PACU  Patient participation: Yes- Able to Participate  Level of consciousness: awake and alert  Post-procedure vital signs: reviewed and stable  Pain management: adequate  Airway patency: patent  PONV status at discharge: No PONV  Anesthetic complications: no      Cardiovascular status: blood pressure returned to baseline and hemodynamically stable  Respiratory status: unassisted  Hydration status: euvolemic  Follow-up not needed.        Visit Vitals  /82 (BP Location: Right arm, Patient Position: Lying)   Pulse 76   Temp 36.1 °C (97 °F) (Skin)   Resp 16   Ht 6' (1.829 m)   Wt 90.7 kg (200 lb)   SpO2 98%   BMI 27.12 kg/m²       Pain/Mina Score: Pain Assessment Performed: Yes (11/2/2018  9:09 AM)  Presence of Pain: denies (11/2/2018  9:09 AM)  Mina Score: 10 (11/2/2018  9:09 AM)

## 2018-11-02 NOTE — DISCHARGE INSTRUCTIONS

## 2018-11-02 NOTE — H&P
Subjective:       Patient ID: Raciel White is a 47 y.o. male.    Chief Complaint: No chief complaint on file.      Raciel is here for snoring. No changes since clinic visit     Review of Systems   Constitutional: Negative for activity change and appetite change.   Eyes: Negative for discharge.   Respiratory: Negative for difficulty breathing and wheezing   Cardiovascular: Negative for chest pain.   Gastrointestinal: Negative for abdominal distention and abdominal pain.   Endocrine: Negative for cold intolerance and heat intolerance.   Genitourinary: Negative for dysuria.   Musculoskeletal: Negative for gait problem and joint swelling.   Skin: Negative for color change and pallor.   Neurological: Negative for syncope and weakness.   Psychiatric/Behavioral: Negative for agitation and confusion.     Objective:        Constitutional:   He is oriented to person, place, and time. He appears well-developed and well-nourished. He appears alert. He is active. Normal speech.      Head:  Normocephalic and atraumatic. Head is without TMJ tenderness. No scars. Salivary glands normal.  Facial strength is normal.      Ears:    Right Ear: No drainage or swelling. No middle ear effusion.   Left Ear: No drainage or swelling.  No middle ear effusion.     Nose:  No mucosal edema, rhinorrhea or sinus tenderness. No turbinate hypertrophy.      Mouth/Throat  Oropharynx clear and moist without lesions or asymmetry, normal uvula midline and mirror exam normal. Normal dentition. No uvula swelling, lacerations or trismus. No oropharyngeal exudate. Tonsillar erythema, tonsillar exudate.      Neck:  Full range of motion with neck supple and no adenopathy. Thyroid tenderness is present. No tracheal deviation, no edema, no erythema, normal range of motion, no stridor, no crepitus and no neck rigidity present. No thyroid mass present.     Cardiovascular:   Intact distal pulses and normal pulses.      Pulmonary/Chest:   Effort normal and breath  sounds normal. No stridor.     Psychiatric:   His speech is normal and behavior is normal. His mood appears not anxious. His affect is not labile.     Neurological:   He is alert and oriented to person, place, and time. No sensory deficit.     Skin:   No abrasions, lacerations, lesions, or rashes. No abrasion and no bruising noted.         Tests / Results:  Reviewed     Assessment:       1. Snoring    2. ROMA (obstructive sleep apnea)          Plan:         Sleep endoscopy as planned

## 2018-11-02 NOTE — PLAN OF CARE
VSS. Questions answered to patient satisfaction. Operative consent needed. H&P needed. Patient ready for procedure.

## 2018-11-02 NOTE — TRANSFER OF CARE
Anesthesia Transfer of Care Note    Patient: Raciel White    Procedure(s) Performed: Procedure(s) (LRB):  Sleep endoscopy (Bilateral)    Patient location: PACU    Anesthesia Type: general    Transport from OR: Transported from OR on room air with adequate spontaneous ventilation    Post pain: adequate analgesia    Post assessment: no apparent anesthetic complications    Post vital signs: stable    Level of consciousness: awake    Nausea/Vomiting: no nausea/vomiting    Complications: none    Transfer of care protocol was followed      Last vitals:   Visit Vitals  /82 (BP Location: Right arm, Patient Position: Lying)   Pulse 76   Temp 36.1 °C (97 °F) (Skin)   Resp 16   Ht 6' (1.829 m)   Wt 90.7 kg (200 lb)   SpO2 98%   BMI 27.12 kg/m²

## 2018-11-07 NOTE — OP NOTE
11/2/18    PREOPERATIVE DIAGNOSES:   1. Obstructive sleep apnea  2. Persistent snoring and apnea after CPAP, hyoid suspension    POSTOPERATIVE DIAGNOSES:   1. Obstructive sleep apnea  2. Persistent snoring and apnea after CPAP, hyoid suspension    PROCEDURES:   1. Drug-induced sleep endoscopy (flexible fiberoptic laryngoscopy   with examination under anesthesia).     22 modifier: due to the complexity of the procedure, with it being performed during simulated sleep state, it required more than 50% operative time and effort. I maintained constant communication with the anesthetist to ensure appropriate level of sedation during each portion of the procedure.      SURGEON: Brandin Rosen MD    ASSISTANT: None    ANESTHESIA: IV sedation.     ESTIMATED BLOOD LOSS: None.     COMPLICATIONS: None.     BRIEF CLINICAL HISTORY: This is a 47 y.o. -year-old male with a history of severe symptomatic obstructive sleep apnea, who is intolerant and unable to achieve benefit with positive pressure therapy.  Of note, the patient has also undergone previous ROMA surgery including hyoid suspension. He has had some improvement since his procedure, but he still endorses persistent snoring that is bothersome to he and his partner. male presents today for drug-induced sleep endoscopy to better characterize the locations and pattern of obstruction and to predict appropriate medical and/or surgical options moving forward.     DESCRIPTION OF PROCEDURE:  The patient was seen in the pre-operative holding area. Informaed consent was signed. Oxymetazoline was sprayed into the nasal cavities, followed by 2% viscous Lidocaine on pledgets placed into the nasal cavities for anesthesia.     The patient was brought to the operating room and was anesthetized via the standard drug-induced sleep endoscopy  protocol. The propofol infusion rate was started at 50 mcg and gradually increased to a level of 125 mcg, at which point, conditions that mimic sleep  were gradually observed.     The patient was non-responsive to verbal commands, but still with spontaneous respiration. Sleep disordered breathing and associated desaturation events were clearly observed.     Under these conditions, the flexible endoscope was inserted to examine both sides of the nose as well as the pharynx and larynx.     The nose was relatively unremarkable, no deviation of septum or turbinate hypertrophy. The retropalatal space showed a intermediate. There was no lateral collapse of the pharyngeal wall at this level, and there was near complete AP collapse.     The oropharynx revealed: significant lateral oropharyngeal wall collapse and no AP collapse.     In the hypopharynx, a very large column of tongue base was observed with minimal complete anterior-posterior retrolingual/retroepiglottic obstruction.     The VOTE score at baseline was:  Velopharynx: Complete AP  Oropharynx: Complete Lateral  Tongue Base: None  Epiglottis: Partial AP    In summary, there was no evidence of complete concentric palatal obstruction. He does appear to be a candidate anatomically for hypoglossal nerve stimulation therapy.    I performed the entire procedure.

## 2019-01-11 ENCOUNTER — HOSPITAL ENCOUNTER (OUTPATIENT)
Dept: RADIOLOGY | Facility: HOSPITAL | Age: 49
Discharge: HOME | End: 2019-01-11
Attending: NURSE PRACTITIONER
Payer: OTHER GOVERNMENT

## 2019-01-11 ENCOUNTER — TELEPHONE (OUTPATIENT)
Dept: FAMILY MEDICINE | Facility: CLINIC | Age: 49
End: 2019-01-11

## 2019-01-11 ENCOUNTER — OFFICE VISIT (OUTPATIENT)
Dept: ORTHOPEDICS | Facility: CLINIC | Age: 49
End: 2019-01-11
Payer: OTHER GOVERNMENT

## 2019-01-11 VITALS — BODY MASS INDEX: 27.09 KG/M2 | WEIGHT: 200 LBS | HEIGHT: 72 IN

## 2019-01-11 DIAGNOSIS — M25.519 ACUTE SHOULDER PAIN, UNSPECIFIED LATERALITY: Primary | ICD-10-CM

## 2019-01-11 DIAGNOSIS — G89.29 CHRONIC LUMBAR PAIN: ICD-10-CM

## 2019-01-11 DIAGNOSIS — Z98.1 HISTORY OF FUSION OF CERVICAL SPINE: ICD-10-CM

## 2019-01-11 DIAGNOSIS — M25.512 LEFT SHOULDER PAIN, UNSPECIFIED CHRONICITY: ICD-10-CM

## 2019-01-11 DIAGNOSIS — M25.512 LEFT SHOULDER PAIN, UNSPECIFIED CHRONICITY: Primary | ICD-10-CM

## 2019-01-11 DIAGNOSIS — M54.50 CHRONIC LUMBAR PAIN: ICD-10-CM

## 2019-01-11 PROCEDURE — 99999 PR PBB SHADOW E&M-EST. PATIENT-LVL II: CPT | Mod: PBBFAC,,, | Performed by: NURSE PRACTITIONER

## 2019-01-11 PROCEDURE — 73030 X-RAY EXAM OF SHOULDER: CPT | Mod: TC,PO,LT

## 2019-01-11 PROCEDURE — 99212 OFFICE O/P EST SF 10 MIN: CPT | Mod: PBBFAC,25,PN | Performed by: NURSE PRACTITIONER

## 2019-01-11 PROCEDURE — 99243 PR OFFICE CONSULTATION,LEVEL III: ICD-10-PCS | Mod: S$PBB,,, | Performed by: NURSE PRACTITIONER

## 2019-01-11 PROCEDURE — 99243 OFF/OP CNSLTJ NEW/EST LOW 30: CPT | Mod: S$PBB,,, | Performed by: NURSE PRACTITIONER

## 2019-01-11 PROCEDURE — 73030 XR SHOULDER TRAUMA 3 VIEW LEFT: ICD-10-PCS | Mod: 26,LT,, | Performed by: RADIOLOGY

## 2019-01-11 PROCEDURE — 99999 PR PBB SHADOW E&M-EST. PATIENT-LVL II: ICD-10-PCS | Mod: PBBFAC,,, | Performed by: NURSE PRACTITIONER

## 2019-01-11 PROCEDURE — 73030 X-RAY EXAM OF SHOULDER: CPT | Mod: 26,LT,, | Performed by: RADIOLOGY

## 2019-01-11 RX ORDER — METHYLPREDNISOLONE 4 MG/1
TABLET ORAL
Qty: 1 PACKAGE | Refills: 0 | Status: SHIPPED | OUTPATIENT
Start: 2019-01-11 | End: 2019-02-01

## 2019-01-11 RX ORDER — CYCLOBENZAPRINE HCL 10 MG
10 TABLET ORAL 3 TIMES DAILY PRN
Qty: 60 TABLET | Refills: 1 | Status: SHIPPED | OUTPATIENT
Start: 2019-01-11 | End: 2019-03-29 | Stop reason: ALTCHOICE

## 2019-01-11 NOTE — TELEPHONE ENCOUNTER
----- Message from Iwona Barbosa sent at 1/11/2019 12:00 PM CST -----  Type:  Patient Requesting Referral    Who Called:  Patient   Does the patient already have the specialty appointment scheduled?:  no  If yes, what is the date of that appointment?:    Referral to What Specialty:  orthopedics  Reason for Referral:  Muscle spasm in left shoulder requesting injection  Does the patient want the referral with a specific physician?:  No  Is the specialist an [facility] or [facility]  Patient Requesting a Call Back?:  Yes  Best Call Back Number:  [phone]  Additional Information:   Patient is requesting referral today.

## 2019-01-11 NOTE — TELEPHONE ENCOUNTER
----- Message from Luiza Mosquera sent at 1/11/2019 10:39 AM CST -----  Type: Needs Medical Advice    Who Called:  Patient  Symptoms (please be specific): muscle spasms  How long has patient had these symptoms:  today  Pharmacy name and phone #:  [facility]  Best Call Back Number: [phone] (home)     Additional Information: would like to discuss his issue

## 2019-01-11 NOTE — LETTER
January 11, 2019      Camron Wong MD  [address] [facility] [address] [place] [address]           [facility]  [address] [facility] [address] [place] [address]  Phone: [phone]          Patient: Raciel White   MR Number: [id number]   YOB: 1970   Date of Visit: 1/11/2019       Dear Dr. Camron Wong:    Thank you for referring Raciel White to me for evaluation. Attached you will find relevant portions of my assessment and plan of care.    If you have questions, please do not hesitate to call me. I look forward to following Raciel White along with you.    Sincerely,    Alena Rodriguez, APRN    Enclosure  CC:  No Recipients    If you would like to receive this communication electronically, please contact [email or web address] or [phone] to request more information on [facility] access.    For providers and/or their staff who would like to refer a patient to [facility], please contact us through our one-stop-shop provider referral line, Arlene Banerjee, at [phone].    If you feel you have received this communication in error or would no longer like to receive these types of communications, please e-mail [email or web address]

## 2019-01-11 NOTE — PROGRESS NOTES
DATE: 1/11/2019  PATIENT: Raciel White  REFERRING MD:   CHIEF COMPLAINT:   Chief Complaint   Patient presents with    Left Shoulder - Pain       HISTORY:  Raciel White is a 48 y.o. male  who presents for initial evaluation of his left shoulder pain.  He is a new patient to me referred by Dr Wong for orthopedic evaluation.  He complains the pain has been going on for 2-3 weeks without cause and worsening.  He describes the pain as constant, about his lower neck and into his upper neck.  His pain rating today is 8/10.  He reports he takes flexeril prescribed by Dr Wong and is requesting a refill today.  He has a history of cervical spine fusion in the  years ago, he was in the [employer].  He has not been evaluated by back and spine for years but states he has tried injections with no relief.    PAST MEDICAL/SURGICAL HISTORY:  Past Medical History:   Diagnosis Date    GERD (gastroesophageal reflux disease)     Insomnia     Sleep apnea     cpap     Past Surgical History:   Procedure Laterality Date    COLONOSCOPY  2011    COLONOSCOPY N/A 11/27/2012    Performed by Phil Vazquez MD at [facility]    Hyoid suspension Bilateral 9/27/2018    [facility]    Sleep endoscopy Bilateral 11/2/2018    Performed by [facility]in [facility] MD at [facility] OR    SPINE SURGERY  2008    Neck fusion C6/7??       Current Medications:   Current Outpatient Medications:     acetaminophen (TYLENOL) 325 MG tablet, Take 2 tablets (650 mg total) by mouth every 4 (four) hours as needed., Disp: , Rfl: 0    amitriptyline (ELAVIL) 10 MG tablet, TAKE 1 TABLET NIGHTLY AS NEEDED, Disp: 90 tablet, Rfl: 3    cyclobenzaprine (FLEXERIL) 10 MG tablet, Take 1 tablet (10 mg total) by mouth 3 (three) times daily as needed., Disp: 60 tablet, Rfl: 1    fluticasone (FLONASE) 50 mcg/actuation nasal spray, 1 spray by Each Nare route once daily., Disp: 1 Bottle, Rfl: 4    multivitamin capsule, Take 1  capsule by mouth once daily., Disp: , Rfl:     naproxen sodium (ANAPROX) 220 MG tablet, Take 220 mg by mouth every 12 (twelve) hours., Disp: , Rfl:     NEXIUM 20 mg capsule, TAKE 1 CAPSULE DAILY (Patient taking differently: TAKE 1 CAPSULE DAILY PRN), Disp: 90 capsule, Rfl: 3    methylPREDNISolone (MEDROL DOSEPACK) 4 mg tablet, use as directed, Disp: 1 Package, Rfl: 0    methylPREDNISolone (MEDROL DOSEPACK) 4 mg tablet, use as directed, Disp: 1 Package, Rfl: 0  No current facility-administered medications for this visit.     Facility-Administered Medications Ordered in Other Visits:     lactated ringers infusion, , Intravenous, Continuous, Jaime Valdez MD, Stopped at 11/02/18 0906    Family History: family history was reviewed and is noncontributory  Social History:   Social History     Socioeconomic History    Marital status:      Spouse name: Not on file    Number of children: Not on file    Years of education: Not on file    Highest education level: Not on file   Social Needs    Financial resource strain: Not on file    Food insecurity - worry: Not on file    Food insecurity - inability: Not on file    Transportation needs - medical: Not on file    Transportation needs - non-medical: Not on file   Occupational History    Not on file   Tobacco Use    Smoking status: Former Smoker     Packs/day: 0.50     Types: Vaping with nicotine    Smokeless tobacco: Former User   Substance and Sexual Activity    Alcohol use: Yes     Alcohol/week: 7.2 oz     Types: 12 Cans of beer per week     Comment: weekly    Drug use: No    Sexual activity: Not on file   Other Topics Concern    Not on file   Social History Narrative    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        ROS:  Constitution: Negative for chills, fever, and sweats. Negative for unexplained weight loss.  HENT: Negative for headaches and blurry vision.   Cardiovascular: Negative for chest pain, irregular heartbeat, leg  swelling and palpitations.   Respiratory: Negative for cough and shortness of breath.   Gastrointestinal: Negative for abdominal pain, heartburn, nausea and vomiting.   Genitourinary: Negative for bladder incontinence and dysuria.   Musculoskeletal: Negative for systemic arthritis, joint swelling, muscle weakness and myalgias.   Neurological: Negative for numbness.   Psychiatric/Behavioral: Negative for depression.   Endocrine: Negative for polyuria.   Hematologic/Lymphatic: Negative for bleeding disorders.  Skin: Negative for poor wound healing.       PHYSICAL EXAM:  Ht 6' (1.829 m)   Wt 90.7 kg (200 lb)   BMI 27.12 kg/m²    Raciel White is a well developed, well nourished male in no acute distress. Physical examination of the left shoulder evaluated the following:    Inspection, palpation and ROM of the cervical spine  Disc compression testing bilaterally  Inspection for swelling, ecchymosis, erythema, deformity and atrophy  Tenderness to palpation of the soft tissue and bony structures  Active and passive range of motion  Sensation of the shoulder and upper extremity  Motor strength in the deltoid, supraspinatus, internal rotators and external rotators  Impingement, apprehension, relocation and Speed's tests  Upper extremity vascular exam (skin temp,color, capillary refill)  Inspection for pseudomotor signs    Remarkable findings included:  No bony deformity  Shoulder ROM full, strength 5/5  Tender to palpation about the upper traps and cervical spine  Pain with cervical spine flexion, extension and lateral rotation  Sensation intact  Skin warm, dry, intact      IMAGING:   X-ray obtained Left shoulder performed today personally reviewed with patient. Radiologist report as follows:    No fracture or subluxation are identified.  The glenohumeral joint is well maintained and well aligned.  There is mild degenerative arthrosis of the acromioclavicular joint with periarticular osteophytes.  Visualized soft tissues  are unremarkable.    ASSESSMENT:   Cervical spine radiculopathy    PLAN:  The nature of the diagnosis, using models and diagrams when appropriate, was explained to the patient in detail. Treatment option discussed included non-operative measures of rest, modification of activities, over the counter pain/antiinflammatory relief, physical therapy, or medrol dosepack.  More aggressive treatment options include referral to pain management or back and spine for further assessment and treatment recommendations.   All questions answered and the patient wishes to proceed today with a medrol dosepack.  He declines further referrals today but will call if no relief with dosepack.  I have place a single time refill on his flexeril and instructed him to follow up with Dr Wong for further refills.

## 2019-02-28 ENCOUNTER — HOSPITAL ENCOUNTER (OUTPATIENT)
Dept: RADIOLOGY | Facility: HOSPITAL | Age: 49
Discharge: HOME | End: 2019-02-28
Attending: NURSE PRACTITIONER
Payer: OTHER GOVERNMENT

## 2019-02-28 ENCOUNTER — OFFICE VISIT (OUTPATIENT)
Dept: FAMILY MEDICINE | Facility: CLINIC | Age: 49
End: 2019-02-28
Payer: OTHER GOVERNMENT

## 2019-02-28 VITALS
HEIGHT: 72 IN | DIASTOLIC BLOOD PRESSURE: 82 MMHG | HEART RATE: 80 BPM | BODY MASS INDEX: 29.35 KG/M2 | WEIGHT: 216.69 LBS | OXYGEN SATURATION: 98 % | SYSTOLIC BLOOD PRESSURE: 118 MMHG

## 2019-02-28 DIAGNOSIS — M48.02 FORAMINAL STENOSIS OF CERVICAL REGION: Primary | ICD-10-CM

## 2019-02-28 DIAGNOSIS — M48.02 FORAMINAL STENOSIS OF CERVICAL REGION: ICD-10-CM

## 2019-02-28 PROCEDURE — 99999 PR PBB SHADOW E&M-EST. PATIENT-LVL IV: ICD-10-PCS | Mod: PBBFAC,,, | Performed by: NURSE PRACTITIONER

## 2019-02-28 PROCEDURE — 99214 OFFICE O/P EST MOD 30 MIN: CPT | Mod: PBBFAC,PO | Performed by: NURSE PRACTITIONER

## 2019-02-28 PROCEDURE — 99214 OFFICE O/P EST MOD 30 MIN: CPT | Mod: S$PBB,,, | Performed by: NURSE PRACTITIONER

## 2019-02-28 PROCEDURE — 72050 X-RAY EXAM NECK SPINE 4/5VWS: CPT | Mod: TC,FY,PO

## 2019-02-28 PROCEDURE — 72050 XR CERVICAL SPINE COMPLETE 5 VIEW: ICD-10-PCS | Mod: 26,,, | Performed by: RADIOLOGY

## 2019-02-28 PROCEDURE — 99214 PR OFFICE/OUTPT VISIT, EST, LEVL IV, 30-39 MIN: ICD-10-PCS | Mod: S$PBB,,, | Performed by: NURSE PRACTITIONER

## 2019-02-28 PROCEDURE — 72050 X-RAY EXAM NECK SPINE 4/5VWS: CPT | Mod: 26,,, | Performed by: RADIOLOGY

## 2019-02-28 PROCEDURE — 99999 PR PBB SHADOW E&M-EST. PATIENT-LVL IV: CPT | Mod: PBBFAC,,, | Performed by: NURSE PRACTITIONER

## 2019-02-28 PROCEDURE — 96372 THER/PROPH/DIAG INJ SC/IM: CPT | Mod: PBBFAC,PO

## 2019-02-28 RX ORDER — KETOROLAC TROMETHAMINE 30 MG/ML
60 INJECTION, SOLUTION INTRAMUSCULAR; INTRAVENOUS
Status: COMPLETED | OUTPATIENT
Start: 2019-02-28 | End: 2019-02-28

## 2019-02-28 RX ORDER — METHYLPREDNISOLONE 4 MG/1
TABLET ORAL
Qty: 21 TABLET | Refills: 0 | Status: SHIPPED | OUTPATIENT
Start: 2019-03-01 | End: 2019-11-04 | Stop reason: ALTCHOICE

## 2019-02-28 RX ADMIN — KETOROLAC TROMETHAMINE 60 MG: 60 INJECTION, SOLUTION INTRAMUSCULAR at 03:02

## 2019-02-28 NOTE — PROGRESS NOTES
"Subjective:       Patient ID: Raciel White is a 48 y.o. male.    Chief Complaint: Shoulder Pain and rx refill (allergie foot cream)    Mr. White is a known patient to me. He presents today for neck/shoulder pain     HPI   He was seen for this problem by ortho on 1/11/19--diagnosed with cervical radiculopathy. MRI cervical spine from 2012:  "1.  Severe metal artifact which obscures the spinal canal and neural foramina from C3-C4 through C6-C7.  2.  Mild uncovertebral spurring on the right at C3-C4 which results in, at most, mild right neuroforaminal stenosis."    Denies decreased shoulder ROM   Pain worsened by certain positions  Vitals:    02/28/19 1450   BP: 118/82   Pulse: 80     Review of Systems   Constitutional: Negative for diaphoresis and fever.   HENT: Negative for facial swelling and trouble swallowing.    Eyes: Negative for discharge and redness.   Respiratory: Negative for cough and shortness of breath.    Cardiovascular: Negative for chest pain and palpitations.   Genitourinary: Negative for difficulty urinating.   Musculoskeletal: Positive for neck pain. Negative for gait problem.   Skin: Negative for pallor and rash.   Neurological: Negative for facial asymmetry and speech difficulty.   Psychiatric/Behavioral: Negative for confusion. The patient is not nervous/anxious.        Past Medical History:   Diagnosis Date    GERD (gastroesophageal reflux disease)     Insomnia     Sleep apnea     cpap     Objective:      Physical Exam   Constitutional: He is oriented to person, place, and time. He does not have a sickly appearance. No distress.   HENT:   Head: Normocephalic.   Right Ear: Hearing normal.   Left Ear: Hearing normal.   Nose: Nose normal.   Eyes: Conjunctivae and lids are normal.   Neck: No JVD present. No tracheal deviation present.   Cardiovascular: Normal rate.   Pulmonary/Chest: Effort normal.   Musculoskeletal: He exhibits no deformity.        Left shoulder: Normal.        Cervical back: " He exhibits pain.   Neurological: He is alert and oriented to person, place, and time.   Skin: He is not diaphoretic. No pallor.   Psychiatric: He has a normal mood and affect. His speech is normal and behavior is normal. Judgment and thought content normal. Cognition and memory are normal.   Nursing note and vitals reviewed.      Assessment:       1. Foraminal stenosis of cervical region        Plan:       Foraminal stenosis of cervical region  -     X-Ray Cervical Spine Complete 5 view; Future; Expected date: 02/28/2019  -     Ambulatory Referral to Back & Spine Clinic  -     ketorolac injection 60 mg  -     methylPREDNISolone (MEDROL DOSEPACK) 4 mg tablet; use as directed  Dispense: 21 tablet; Refill: 0      Follow-up for further evaluation/treatment recommendations with specialist.    Medication List with Changes/Refills   New Medications    METHYLPREDNISOLONE (MEDROL DOSEPACK) 4 MG TABLET    use as directed   Current Medications    ACETAMINOPHEN (TYLENOL) 325 MG TABLET    Take 2 tablets (650 mg total) by mouth every 4 (four) hours as needed.    AMITRIPTYLINE (ELAVIL) 10 MG TABLET    TAKE 1 TABLET NIGHTLY AS NEEDED    CYCLOBENZAPRINE (FLEXERIL) 10 MG TABLET    Take 1 tablet (10 mg total) by mouth 3 (three) times daily as needed.    FLUTICASONE (FLONASE) 50 MCG/ACTUATION NASAL SPRAY    1 spray by Each Nare route once daily.    MULTIVITAMIN CAPSULE    Take 1 capsule by mouth once daily.    NAPROXEN SODIUM (ANAPROX) 220 MG TABLET    Take 220 mg by mouth every 12 (twelve) hours.    NEXIUM 20 MG CAPSULE    TAKE 1 CAPSULE DAILY

## 2019-03-29 ENCOUNTER — OFFICE VISIT (OUTPATIENT)
Dept: SPINE | Facility: CLINIC | Age: 49
End: 2019-03-29
Payer: OTHER GOVERNMENT

## 2019-03-29 VITALS
DIASTOLIC BLOOD PRESSURE: 80 MMHG | BODY MASS INDEX: 29.26 KG/M2 | HEART RATE: 87 BPM | WEIGHT: 216 LBS | RESPIRATION RATE: 16 BRPM | SYSTOLIC BLOOD PRESSURE: 115 MMHG | HEIGHT: 72 IN

## 2019-03-29 DIAGNOSIS — Z98.890 H/O CERVICAL SPINE SURGERY: Primary | ICD-10-CM

## 2019-03-29 DIAGNOSIS — M54.12 CERVICAL RADICULOPATHY: ICD-10-CM

## 2019-03-29 PROCEDURE — 99999 PR PBB SHADOW E&M-EST. PATIENT-LVL III: CPT | Mod: PBBFAC,,, | Performed by: PHYSICIAN ASSISTANT

## 2019-03-29 PROCEDURE — 99999 PR PBB SHADOW E&M-EST. PATIENT-LVL III: ICD-10-PCS | Mod: PBBFAC,,, | Performed by: PHYSICIAN ASSISTANT

## 2019-03-29 PROCEDURE — 99243 OFF/OP CNSLTJ NEW/EST LOW 30: CPT | Mod: S$PBB,,, | Performed by: PHYSICIAN ASSISTANT

## 2019-03-29 PROCEDURE — 99213 OFFICE O/P EST LOW 20 MIN: CPT | Mod: PBBFAC,PN | Performed by: PHYSICIAN ASSISTANT

## 2019-03-29 PROCEDURE — 99243 PR OFFICE CONSULTATION,LEVEL III: ICD-10-PCS | Mod: S$PBB,,, | Performed by: PHYSICIAN ASSISTANT

## 2019-03-29 RX ORDER — TIZANIDINE 4 MG/1
TABLET ORAL
Qty: 40 TABLET | Refills: 0 | Status: SHIPPED | OUTPATIENT
Start: 2019-03-29 | End: 2020-01-09 | Stop reason: SINTOL

## 2019-03-29 NOTE — LETTER
March 29, 2019      Christal oD, NP  [address] [facility] [address] [place] [address]           [facility]  [address] [facility] [address]  [facility] [address]  Phone: [phone]  Fax: [phone]          Patient: Raciel White   MR Number: [id number]   YOB: 1970   Date of Visit: 3/29/2019       Dear Christal Do:    Thank you for referring Raciel White to me for evaluation. Attached you will find relevant portions of my assessment and plan of care.    If you have questions, please do not hesitate to call me. I look forward to following Raciel White along with you.    Sincerely,    Bhavna Wild PA-C    Enclosure  CC:  No Recipients    If you would like to receive this communication electronically, please contact [email or web address] or [phone] to request more information on [employer] access.    For providers and/or their staff who would like to refer a patient to [facility], please contact us through our one-stop-shop provider referral line, [facility] , at [phone].    If you feel you have received this communication in error or would no longer like to receive these types of communications, please e-mail [email or web address]

## 2019-03-29 NOTE — PROGRESS NOTES
Neurosurgery History & Physical    Patient ID: Raciel White is a 48 y.o. male.    Chief Complaint   Patient presents with    Cervical Spine Pain (C-spine)       Review of Systems   Constitutional: Negative for activity change, chills, fatigue and unexpected weight change.   HENT: Negative for hearing loss, tinnitus, trouble swallowing and voice change.    Eyes: Negative for visual disturbance.   Respiratory: Negative for apnea, chest tightness and shortness of breath.    Cardiovascular: Negative for chest pain and palpitations.   Gastrointestinal: Negative for abdominal pain, constipation, diarrhea, nausea and vomiting.   Genitourinary: Negative for difficulty urinating, dysuria and frequency.   Musculoskeletal: Positive for myalgias and neck pain. Negative for back pain, gait problem and neck stiffness.   Skin: Negative for wound.   Neurological: Positive for weakness and numbness. Negative for dizziness, tremors, seizures, facial asymmetry, speech difficulty, light-headedness and headaches.   Psychiatric/Behavioral: Negative for confusion and decreased concentration.       Past Medical History:   Diagnosis Date    GERD (gastroesophageal reflux disease)     Insomnia     Sleep apnea     cpap       Family History   Problem Relation Age of Onset    Hypertension Paternal Grandfather     Diabetes Neg Hx     Cancer Neg Hx     Glaucoma Neg Hx     Macular degeneration Neg Hx     Retinal detachment Neg Hx      Review of patient's allergies indicates:  No Known Allergies    Current Outpatient Medications:     acetaminophen (TYLENOL) 325 MG tablet, Take 2 tablets (650 mg total) by mouth every 4 (four) hours as needed., Disp: , Rfl: 0    amitriptyline (ELAVIL) 10 MG tablet, TAKE 1 TABLET NIGHTLY AS NEEDED, Disp: 90 tablet, Rfl: 3    fluticasone (FLONASE) 50 mcg/actuation nasal spray, 1 spray by Each Nare route once daily., Disp: 1 Bottle, Rfl: 4    multivitamin capsule, Take 1 capsule by mouth once daily., Disp:  , Rfl:     naproxen sodium (ANAPROX) 220 MG tablet, Take 220 mg by mouth every 12 (twelve) hours., Disp: , Rfl:     NEXIUM 20 mg capsule, TAKE 1 CAPSULE DAILY (Patient taking differently: TAKE 1 CAPSULE DAILY PRN), Disp: 90 capsule, Rfl: 3    methylPREDNISolone (MEDROL DOSEPACK) 4 mg tablet, use as directed, Disp: 21 tablet, Rfl: 0    tiZANidine (ZANAFLEX) 4 MG tablet, Take 1 tablet by mouth 3 times per day as needed for muscle spasm., Disp: 40 tablet, Rfl: 0  No current facility-administered medications for this visit.     Facility-Administered Medications Ordered in Other Visits:     lactated ringers infusion, , Intravenous, Continuous, Jaime Valdez MD, Stopped at 11/02/18 0906    Vitals:    03/29/19 1541   BP: 115/80   BP Location: Right arm   Patient Position: Sitting   BP Method: Medium (Automatic)   Pulse: 87   Resp: 16   Weight: 98 kg (216 lb)   Height: 6' (1.829 m)       Physical Exam   Constitutional: He is oriented to person, place, and time. He appears well-developed and well-nourished.   HENT:   Head: Normocephalic and atraumatic.   Eyes: Pupils are equal, round, and reactive to light.   Neck: Normal range of motion. Neck supple.   Cardiovascular: Normal rate.   Pulmonary/Chest: Effort normal.   Abdominal: He exhibits no distension.   Musculoskeletal: Normal range of motion. He exhibits no edema.   Neurological: He is alert and oriented to person, place, and time. He has a normal Finger-Nose-Finger Test, a normal Heel to Shin Test, a normal Romberg Test and a normal Tandem Gait Test. Gait normal.   Reflex Scores:       Tricep reflexes are 2+ on the right side and 2+ on the left side.       Bicep reflexes are 2+ on the right side and 2+ on the left side.       Brachioradialis reflexes are 2+ on the right side and 2+ on the left side.       Patellar reflexes are 2+ on the right side and 2+ on the left side.       Achilles reflexes are 2+ on the right side and 2+ on the left side.  Skin: Skin is  warm and dry.   Psychiatric: He has a normal mood and affect. His speech is normal and behavior is normal. Judgment and thought content normal.   Nursing note and vitals reviewed.      Neurologic Exam     Mental Status   Oriented to person, place, and time.   Oriented to person.   Oriented to place.   Oriented to time.   Follows 3 step commands.   Attention: normal. Concentration: normal.   Speech: speech is normal   Level of consciousness: alert  Knowledge: consistent with education.   Able to name object. Able to read. Able to repeat. Able to write. Normal comprehension.     Cranial Nerves     CN II   Visual acuity: normal  Right visual field deficit: none  Left visual field deficit: none     CN III, IV, VI   Pupils are equal, round, and reactive to light.  Right pupil: Size: 3 mm. Shape: regular. Reactivity: brisk. Consensual response: intact.   Left pupil: Size: 3 mm. Shape: regular. Reactivity: brisk. Consensual response: intact.   CN III: no CN III palsy  CN VI: no CN VI palsy  Nystagmus: none   Diplopia: none  Ophthalmoparesis: none  Conjugate gaze: present    CN V   Right facial sensation deficit: none  Left facial sensation deficit: none    CN VII   Right facial weakness: none  Left facial weakness: none    CN VIII   Hearing: intact    CN IX, X   CN IX normal.   CN X normal.     CN XI   Right sternocleidomastoid strength: normal  Left sternocleidomastoid strength: normal  Right trapezius strength: normal  Left trapezius strength: normal    CN XII   Fasciculations: absent  Tongue deviation: none    Motor Exam   Muscle bulk: normal  Overall muscle tone: normal  Right arm pronator drift: absent  Left arm pronator drift: absent    Strength   Right neck flexion: 5/5  Left neck flexion: 5/5  Right neck extension: 5/5  Left neck extension: 5/5  Right deltoid: 5/5  Left deltoid: 5/5  Right biceps: 5/5  Left biceps: 5/5  Right triceps: 5/5  Left triceps: 5/5  Right wrist flexion: 5/5  Left wrist flexion: 5/5  Right  wrist extension: 5/5  Left wrist extension: 5/5  Right interossei: 5/5  Left interossei: 5/5  Right abdominals: 5/5  Left abdominals: 5/5  Right iliopsoas: 5/5  Left iliopsoas: 5/5  Right quadriceps: 5/5  Left quadriceps: 5/5  Right hamstrin/5  Left hamstrin/5  Right glutei: 5/5  Left glutei: 5/5  Right anterior tibial: 5/5  Left anterior tibial: 5/5  Right posterior tibial: 5/5  Left posterior tibial: 5/5  Right peroneal: 5/5  Left peroneal: 5/5  Right gastroc: 5/5  Left gastroc: 5/5    Sensory Exam   Right arm light touch: normal  Left arm light touch: normal  Right leg light touch: normal  Left leg light touch: normal  Right arm vibration: normal  Left arm vibration: normal  Right arm pinprick: normal  Left arm pinprick: normal  Left sensory deficit distribution: C7, C8.    Gait, Coordination, and Reflexes     Gait  Gait: normal    Coordination   Romberg: negative  Finger to nose coordination: normal  Heel to shin coordination: normal  Tandem walking coordination: normal    Tremor   Resting tremor: absent  Intention tremor: absent  Action tremor: absent    Reflexes   Right brachioradialis: 2+  Left brachioradialis: 2+  Right biceps: 2+  Left biceps: 2+  Right triceps: 2+  Left triceps: 2+  Right patellar: 2+  Left patellar: 2+  Right achilles: 2+  Left achilles: 2+  Right Guerrero: absent  Left Guerrero: absent  Right ankle clonus: absent  Left ankle clonus: absent      Provider dictation:  48-year-old male with history of cervical spine surgery in 2008 who is a former smoker is referred by Mike Do for evaluation of neck/ left arm pain.  He underwent C5-6 artificial disc/flexible disc spacer with [employer] in  performed anteriorly.  In 2019 he developed pain in the left neck and left shoulder.  Pain progressed to now include the left neck, left interscapular, shoulder, arm and hand to the 4th and 5th digits.  It is associated with numbness and some weakness in the hand.  He has had a  Toradol injection and a steroid pack and both December and February.  He current we takes naproxen and Flexeril.  He does some home stretching.  He has not had physical therapy or epidural steroid injection.  NDI score: 30.  PHQ:  2.    On exam he has decreased sensation in a left C7, C8 distribution into the hand.  Otherwise he is neurologically intact with 5/5 strength and 2+ muscle stretch reflexes throughout.    I have reviewed an MRI of the cervical spine from 2012 revealing postoperative changes of an ACDF.  There is severe artifact from the metal obscuring images from C3-4 to C6-7.  There is no significant central canal or foraminal narrowing at any of the areas assessed.    I reviewed x-rays of the cervical spine from 02/28/2019 revealing postoperative changes of anterior disc spacer at C5-6.  There are mild degenerative changes particularly at C3-4 and C4-5 noted.    This is a 48-year-old male who has had prior C5-6 anterior cervical spine surgery.  He has new acute onset left-sided neck, shoulder and C7, C8 radiculopathy.  It is possible this could be from adjacent level disease.  I will discuss further with Radiology to determine if trying to obtain an updated MRI would be worthwhile or if we would have the same a metal artifact seen.  If MRI is advised against, we will proceed followed with a CT scan of the neck.  I am sending an Zanaflex for muscle spasms and he can discontinue Flexeril as it is not helping at this time.    Addendum:  4/2/19  I spoke with radiology.  Due to significance of artifact and little changes in imaging software, it is expected that even with metal suspression MRI the artifact will be too great.  We will proceed with a CT cervical spine to further assess.  Follow up in clinic after imaging.    Visit Diagnosis:  H/O cervical spine surgery  -     tiZANidine (ZANAFLEX) 4 MG tablet; Take 1 tablet by mouth 3 times per day as needed for muscle spasm.  Dispense: 40 tablet; Refill: 0  -      CT Cervical Spine Without Contrast; Future; Expected date: 04/02/2019    Cervical radiculopathy  -     tiZANidine (ZANAFLEX) 4 MG tablet; Take 1 tablet by mouth 3 times per day as needed for muscle spasm.  Dispense: 40 tablet; Refill: 0  -     CT Cervical Spine Without Contrast; Future; Expected date: 04/02/2019        Total time spent counseling greater than fifty percent of total visit time.  Counseling included discussion regarding imaging findings, diagnosis possibilities, treatment options, risks and benefits.   The patient had many questions regarding the options and long-term effects.

## 2019-04-02 ENCOUNTER — TELEPHONE (OUTPATIENT)
Dept: SPINE | Facility: CLINIC | Age: 49
End: 2019-04-02

## 2019-04-02 NOTE — TELEPHONE ENCOUNTER
Called patient to schedule him for a CT Scan and a return to Bhavna Wild for results, got voicemail, left return call message.

## 2019-04-02 NOTE — TELEPHONE ENCOUNTER
Spoke with patient and scheduled him for a CT Scan 4-12-19 and a return to Bhavna Wild for results the same day.

## 2019-04-02 NOTE — TELEPHONE ENCOUNTER
----- Message from Bhavna Wild PA-C sent at 4/2/2019  1:49 PM CDT -----  Please contact patient to schedule CT cervical and follow up in clinic after.  Thanks.

## 2019-04-12 ENCOUNTER — OFFICE VISIT (OUTPATIENT)
Dept: SPINE | Facility: CLINIC | Age: 49
End: 2019-04-12
Payer: OTHER GOVERNMENT

## 2019-04-12 ENCOUNTER — HOSPITAL ENCOUNTER (OUTPATIENT)
Dept: RADIOLOGY | Facility: HOSPITAL | Age: 49
Discharge: HOME | End: 2019-04-12
Attending: PHYSICIAN ASSISTANT
Payer: OTHER GOVERNMENT

## 2019-04-12 VITALS
DIASTOLIC BLOOD PRESSURE: 82 MMHG | HEART RATE: 82 BPM | HEIGHT: 72 IN | SYSTOLIC BLOOD PRESSURE: 119 MMHG | WEIGHT: 216.06 LBS | BODY MASS INDEX: 29.26 KG/M2

## 2019-04-12 DIAGNOSIS — Z98.890 H/O CERVICAL SPINE SURGERY: Primary | ICD-10-CM

## 2019-04-12 DIAGNOSIS — M54.12 CERVICAL RADICULOPATHY: ICD-10-CM

## 2019-04-12 DIAGNOSIS — G56.20 ULNAR NEUROPATHY, UNSPECIFIED LATERALITY: ICD-10-CM

## 2019-04-12 DIAGNOSIS — Z98.890 H/O CERVICAL SPINE SURGERY: ICD-10-CM

## 2019-04-12 PROCEDURE — 99999 PR PBB SHADOW E&M-EST. PATIENT-LVL III: ICD-10-PCS | Mod: PBBFAC,,, | Performed by: PHYSICIAN ASSISTANT

## 2019-04-12 PROCEDURE — 72125 CT NECK SPINE W/O DYE: CPT | Mod: 26,,, | Performed by: RADIOLOGY

## 2019-04-12 PROCEDURE — 99214 PR OFFICE/OUTPT VISIT, EST, LEVL IV, 30-39 MIN: ICD-10-PCS | Mod: S$PBB,,, | Performed by: PHYSICIAN ASSISTANT

## 2019-04-12 PROCEDURE — 99214 OFFICE O/P EST MOD 30 MIN: CPT | Mod: S$PBB,,, | Performed by: PHYSICIAN ASSISTANT

## 2019-04-12 PROCEDURE — 72125 CT CERVICAL SPINE WITHOUT CONTRAST: ICD-10-PCS | Mod: 26,,, | Performed by: RADIOLOGY

## 2019-04-12 PROCEDURE — 99999 PR PBB SHADOW E&M-EST. PATIENT-LVL III: CPT | Mod: PBBFAC,,, | Performed by: PHYSICIAN ASSISTANT

## 2019-04-12 PROCEDURE — 72125 CT NECK SPINE W/O DYE: CPT | Mod: TC,PO

## 2019-04-12 PROCEDURE — 99213 OFFICE O/P EST LOW 20 MIN: CPT | Mod: PBBFAC,25,PN | Performed by: PHYSICIAN ASSISTANT

## 2019-04-12 NOTE — PROGRESS NOTES
Neurosurgery History & Physical    Patient ID: Raciel White is a 48 y.o. male.    Chief Complaint   Patient presents with    Follow-up     CT Scan results       Review of Systems   Constitutional: Negative for activity change, chills, fatigue and unexpected weight change.   HENT: Negative for hearing loss, tinnitus, trouble swallowing and voice change.    Eyes: Negative for visual disturbance.   Respiratory: Negative for apnea, chest tightness and shortness of breath.    Cardiovascular: Negative for chest pain and palpitations.   Gastrointestinal: Negative for abdominal pain, constipation, diarrhea, nausea and vomiting.   Genitourinary: Negative for difficulty urinating, dysuria and frequency.   Musculoskeletal: Positive for myalgias and neck pain. Negative for back pain, gait problem and neck stiffness.   Skin: Negative for wound.   Neurological: Positive for weakness and numbness. Negative for dizziness, tremors, seizures, facial asymmetry, speech difficulty, light-headedness and headaches.   Psychiatric/Behavioral: Negative for confusion and decreased concentration.       Past Medical History:   Diagnosis Date    GERD (gastroesophageal reflux disease)     Insomnia     Sleep apnea     cpap       Family History   Problem Relation Age of Onset    Hypertension Paternal Grandfather     Diabetes Neg Hx     Cancer Neg Hx     Glaucoma Neg Hx     Macular degeneration Neg Hx     Retinal detachment Neg Hx      Review of patient's allergies indicates:  No Known Allergies    Current Outpatient Medications:     acetaminophen (TYLENOL) 325 MG tablet, Take 2 tablets (650 mg total) by mouth every 4 (four) hours as needed., Disp: , Rfl: 0    amitriptyline (ELAVIL) 10 MG tablet, TAKE 1 TABLET NIGHTLY AS NEEDED, Disp: 90 tablet, Rfl: 3    fluticasone (FLONASE) 50 mcg/actuation nasal spray, 1 spray by Each Nare route once daily., Disp: 1 Bottle, Rfl: 4    methylPREDNISolone (MEDROL DOSEPACK) 4 mg tablet, use as  directed, Disp: 21 tablet, Rfl: 0    multivitamin capsule, Take 1 capsule by mouth once daily., Disp: , Rfl:     naproxen sodium (ANAPROX) 220 MG tablet, Take 220 mg by mouth every 12 (twelve) hours., Disp: , Rfl:     NEXIUM 20 mg capsule, TAKE 1 CAPSULE DAILY (Patient taking differently: TAKE 1 CAPSULE DAILY PRN), Disp: 90 capsule, Rfl: 3    tiZANidine (ZANAFLEX) 4 MG tablet, Take 1 tablet by mouth 3 times per day as needed for muscle spasm., Disp: 40 tablet, Rfl: 0  No current facility-administered medications for this visit.     Facility-Administered Medications Ordered in Other Visits:     lactated ringers infusion, , Intravenous, Continuous, Jaime Valdez MD, Stopped at 11/02/18 0906    Vitals:    04/12/19 1515   BP: 119/82   BP Location: Left arm   Patient Position: Sitting   BP Method: Large (Automatic)   Pulse: 82   Weight: 98 kg (216 lb 0.8 oz)   Height: 6' (1.829 m)       Physical Exam   Constitutional: He is oriented to person, place, and time. He appears well-developed and well-nourished.   HENT:   Head: Normocephalic and atraumatic.   Eyes: Pupils are equal, round, and reactive to light.   Neck: Normal range of motion. Neck supple.   Cardiovascular: Normal rate.   Pulmonary/Chest: Effort normal.   Abdominal: He exhibits no distension.   Musculoskeletal: Normal range of motion. He exhibits no edema.   Neurological: He is alert and oriented to person, place, and time. He has a normal Finger-Nose-Finger Test, a normal Heel to Shin Test, a normal Romberg Test and a normal Tandem Gait Test. Gait normal.   Reflex Scores:       Tricep reflexes are 2+ on the right side and 2+ on the left side.       Bicep reflexes are 2+ on the right side and 2+ on the left side.       Brachioradialis reflexes are 2+ on the right side and 2+ on the left side.       Patellar reflexes are 2+ on the right side and 2+ on the left side.       Achilles reflexes are 2+ on the right side and 2+ on the left side.  Skin: Skin  is warm and dry.   Psychiatric: He has a normal mood and affect. His speech is normal and behavior is normal. Judgment and thought content normal.   Nursing note and vitals reviewed.      Neurologic Exam     Mental Status   Oriented to person, place, and time.   Oriented to person.   Oriented to place.   Oriented to time.   Follows 3 step commands.   Attention: normal. Concentration: normal.   Speech: speech is normal   Level of consciousness: alert  Knowledge: consistent with education.   Able to name object. Able to read. Able to repeat. Able to write. Normal comprehension.     Cranial Nerves     CN II   Visual acuity: normal  Right visual field deficit: none  Left visual field deficit: none     CN III, IV, VI   Pupils are equal, round, and reactive to light.  Right pupil: Size: 3 mm. Shape: regular. Reactivity: brisk. Consensual response: intact.   Left pupil: Size: 3 mm. Shape: regular. Reactivity: brisk. Consensual response: intact.   CN III: no CN III palsy  CN VI: no CN VI palsy  Nystagmus: none   Diplopia: none  Ophthalmoparesis: none  Conjugate gaze: present    CN V   Right facial sensation deficit: none  Left facial sensation deficit: none    CN VII   Right facial weakness: none  Left facial weakness: none    CN VIII   Hearing: intact    CN IX, X   CN IX normal.   CN X normal.     CN XI   Right sternocleidomastoid strength: normal  Left sternocleidomastoid strength: normal  Right trapezius strength: normal  Left trapezius strength: normal    CN XII   Fasciculations: absent  Tongue deviation: none    Motor Exam   Muscle bulk: normal  Overall muscle tone: normal  Right arm pronator drift: absent  Left arm pronator drift: absent    Strength   Right neck flexion: 5/5  Left neck flexion: 5/5  Right neck extension: 5/5  Left neck extension: 5/5  Right deltoid: 5/5  Left deltoid: 5/5  Right biceps: 5/5  Left biceps: 5/5  Right triceps: 5/5  Left triceps: 5/5  Right wrist flexion: 5/5  Left wrist flexion:  5/5  Right wrist extension: 5/5  Left wrist extension: 5/5  Right interossei: 5/5  Left interossei: 5/5  Right abdominals: 5/5  Left abdominals: 5/5  Right iliopsoas: 5/5  Left iliopsoas: 5/5  Right quadriceps: 5/5  Left quadriceps: 5/5  Right hamstrin/5  Left hamstrin/5  Right glutei: 5/5  Left glutei: 5/5  Right anterior tibial: 5/5  Left anterior tibial: 5/5  Right posterior tibial: 5/5  Left posterior tibial: 5/5  Right peroneal: 5/5  Left peroneal: 5/5  Right gastroc: 5/5  Left gastroc: 55    Sensory Exam   Right arm light touch: normal  Left arm light touch: normal  Right leg light touch: normal  Left leg light touch: normal  Right arm vibration: normal  Left arm vibration: normal  Right arm pinprick: normal  Left arm pinprick: normal  Left sensory deficit distribution: C7, C8.    Gait, Coordination, and Reflexes     Gait  Gait: normal    Coordination   Romberg: negative  Finger to nose coordination: normal  Heel to shin coordination: normal  Tandem walking coordination: normal    Tremor   Resting tremor: absent  Intention tremor: absent  Action tremor: absent    Reflexes   Right brachioradialis: 2+  Left brachioradialis: 2+  Right biceps: 2+  Left biceps: 2+  Right triceps: 2+  Left triceps: 2+  Right patellar: 2+  Left patellar: 2+  Right achilles: 2+  Left achilles: 2+  Right Guerrero: absent  Left Guerrero: absent  Right ankle clonus: absent  Left ankle clonus: absent      Provider dictation:  48-year-old male with history o fC5/6 cervical spine surgery in 2008 who is a former smoker presents for follow up evaluation of neck/ left arm pain after undergoing updated imaging  He underwent C5-6 artificial disc/flexible disc spacer with [employer] in  performed anteriorly.  In 2019 he developed pain in the left neck and left shoulder.  Pain progressed to include the left neck, left interscapular, shoulder, arm and hand to the 4th and 5th digits.  It is associated with numbness and some  weakness in the hand.  He has had a Toradol injection and a steroid pack and both December and February.  Zanaflex has helped since last visit.  Currently, he feels mainly pain in the left interscapular and left trapezius area just proximal to the shoulder.  He has also noticed that numbness in the left arm is from the elbow to the hand in an ulnar nerve distribution and mainly when the elbow is bent with pressure placed on it.  NDI score: 30.  PHQ:  2.    On exam he has decreased sensation in a left ulnar nerve distribution into the hand.  Otherwise he is neurologically intact with 5/5 strength and 2+ muscle stretch reflexes throughout.  Phalens positive at the left elbow for ulnar neuropathy    I have reviewed an MRI of the cervical spine from 2012 revealing postoperative changes of an ACDF.  There is severe artifact from the metal obscuring images from C3-4 to C6-7.  There is no significant central canal or foraminal narrowing at any of the areas assessed.    I reviewed x-rays of the cervical spine from 02/28/2019 revealing postoperative changes of anterior disc spacer at C5-6.  There are mild degenerative changes particularly at C3-4 and C4-5 noted.    I have reviewed a CT scan performed today of the cervical spine revealing postoperative changes of C5-6 anterior fusion with no evidence of hardware complication seen.  At C6-7 there is mild spondylosis with mild left greater than right foraminal narrowing and facet arthropathy.    This is a 48-year-old male who has had prior C5-6 anterior cervical spine surgery.  He has new acute onset left-sided neck, shoulder pain and what is more likely left ulnar neuroapathy than C7, C8 radiculopathy as determined by positive phalens and only minimal adjacent level disease and mild foraminal narrowing at C6/7.  PT and trigger point injections can help neck pain.  He would like to proceed with TPI and we will have him see pain management.  For ulnar neuropathy  - Advised  keeping the left elbow straight and no pressure on it - these activity modifications should help.  If it does not, we could confirm with nerve testing and refer to surgeon.    Visit Diagnosis:  H/O cervical spine surgery    Cervical radiculopathy    Ulnar neuropathy, unspecified laterality        Total time spent counseling greater than fifty percent of total visit time.  Counseling included discussion regarding imaging findings, diagnosis possibilities, treatment options, risks and benefits.   The patient had many questions regarding the options and long-term effects.

## 2019-04-16 ENCOUNTER — TELEPHONE (OUTPATIENT)
Dept: PAIN MEDICINE | Facility: CLINIC | Age: 49
End: 2019-04-16

## 2019-04-16 NOTE — TELEPHONE ENCOUNTER
----- Message from Radha Cutler sent at 4/15/2019  4:18 PM CDT -----  Contact: Patient   Patient is requesting a later time on his 04/17 appointment he have scheduled and the earliest Epic has is 04/30. Patient want to come in on the day of his appointment on 04/17 but just want to come at a later time.    Please contact to advise [phone]

## 2019-04-17 ENCOUNTER — OFFICE VISIT (OUTPATIENT)
Dept: PAIN MEDICINE | Facility: CLINIC | Age: 49
End: 2019-04-17
Payer: OTHER GOVERNMENT

## 2019-04-17 VITALS
DIASTOLIC BLOOD PRESSURE: 68 MMHG | TEMPERATURE: 97 F | WEIGHT: 222.31 LBS | HEIGHT: 72 IN | OXYGEN SATURATION: 99 % | HEART RATE: 77 BPM | RESPIRATION RATE: 18 BRPM | BODY MASS INDEX: 30.11 KG/M2 | SYSTOLIC BLOOD PRESSURE: 144 MMHG

## 2019-04-17 DIAGNOSIS — M50.30 DDD (DEGENERATIVE DISC DISEASE), CERVICAL: ICD-10-CM

## 2019-04-17 DIAGNOSIS — M79.18 MYOFASCIAL PAIN: Primary | ICD-10-CM

## 2019-04-17 PROCEDURE — 99204 PR OFFICE/OUTPT VISIT, NEW, LEVL IV, 45-59 MIN: ICD-10-PCS | Mod: 25,S$PBB,, | Performed by: PHYSICIAN ASSISTANT

## 2019-04-17 PROCEDURE — 99999 PR PBB SHADOW E&M-EST. PATIENT-LVL IV: CPT | Mod: PBBFAC,,, | Performed by: PHYSICIAN ASSISTANT

## 2019-04-17 PROCEDURE — 99204 OFFICE O/P NEW MOD 45 MIN: CPT | Mod: 25,S$PBB,, | Performed by: PHYSICIAN ASSISTANT

## 2019-04-17 PROCEDURE — 20553 NJX 1/MLT TRIGGER POINTS 3/>: CPT | Mod: S$PBB,,, | Performed by: PHYSICIAN ASSISTANT

## 2019-04-17 PROCEDURE — 20553 PR INJECT TRIGGER POINTS, > 3: ICD-10-PCS | Mod: S$PBB,,, | Performed by: PHYSICIAN ASSISTANT

## 2019-04-17 PROCEDURE — 99214 OFFICE O/P EST MOD 30 MIN: CPT | Mod: PBBFAC,PN,25 | Performed by: PHYSICIAN ASSISTANT

## 2019-04-17 PROCEDURE — 99999 PR PBB SHADOW E&M-EST. PATIENT-LVL IV: ICD-10-PCS | Mod: PBBFAC,,, | Performed by: PHYSICIAN ASSISTANT

## 2019-04-17 PROCEDURE — 20553 NJX 1/MLT TRIGGER POINTS 3/>: CPT | Mod: PBBFAC,PN | Performed by: PHYSICIAN ASSISTANT

## 2019-04-17 RX ORDER — NABUMETONE 750 MG/1
750 TABLET, FILM COATED ORAL 2 TIMES DAILY PRN
Qty: 60 TABLET | Refills: 0 | Status: SHIPPED | OUTPATIENT
Start: 2019-04-17 | End: 2019-11-04

## 2019-04-17 NOTE — LETTER
April 22, 2019      Bhavna Wild PA-C  [address] [facility] [address]  [address]  [facility] [address]           [place] [facility]  [address] [facility] [address] [place] [address]  Phone: [phone]  Fax: [phone]          Patient: Raciel White   MR Number: [id number]   YOB: 1970   Date of Visit: 4/17/2019       Dear Bhavna Wild:    Thank you for referring Raciel White to me for evaluation. Attached you will find relevant portions of my assessment and plan of care.    If you have questions, please do not hesitate to call me. I look forward to following Raciel White along with you.    Sincerely,    RANI Schmidt    Enclosure  CC:  No Recipients    If you would like to receive this communication electronically, please contact [email or web address] or [phone] to request more information on [employer] Link access.    For providers and/or their staff who would like to refer a patient to [facility], please contact us through our one-stop-shop provider referral line, [facility] , at [phone].    If you feel you have received this communication in error or would no longer like to receive these types of communications, please e-mail [email or web address]

## 2019-04-22 NOTE — PROGRESS NOTES
This note was completed with dictation software and grammatical errors may exist.    CC:  Left upper back pain    HPI:  The patient is a 48-year-old male with past medical history significant for C5/6 cervical spine surgery in 2008 who presents in referral from Bhavna Wild PA-C for left upper back pain. He reports having a very mild baseline degree of pain that has been present since his neck surgery in 2008 in the  but this was very tolerable until December when he developed severe pain in the left scapular region.  He does not have significant neck pain today but reports constant pain in the left scapular region.  This is significantly worse with looking to the left and looking up.  He describes it is grabbing, tight, also worse 1st things in the morning and improved with lying down.  He has been taking Flexeril without significant relief and reports drowsiness with this.  He has some numbness and weakness in the left arm and hand in an ulnar distribution.  He denies bladder or bowel incontinence.    Pain intervention history:  C5-6 artificial disc/flexible disc spacer in 2008.  He has taken Flexeril without significant relief and this causes drowsiness.    ROS:  The patient reports left upper back pain.  Balance of review of systems is negative.    Past Medical History:   Diagnosis Date    GERD (gastroesophageal reflux disease)     Insomnia     Sleep apnea     cpap       Past Surgical History:   Procedure Laterality Date    COLONOSCOPY  2011    COLONOSCOPY N/A 11/27/2012    Performed by [facility] at [facility] ENDO    Hyoid suspension Bilateral 9/27/2018    [facility]    Sleep endoscopy Bilateral 11/2/2018    Performed by [facility]in [facility] at [facility] OR    SPINE SURGERY  2008    Neck fusion C6/7??       Social History     Socioeconomic History    Marital status:      Spouse name: Not on file    Number of children: Not on file    Years of  education: Not on file    Highest education level: Not on file   Occupational History    Not on file   Social Needs    Financial resource strain: Not on file    Food insecurity:     Worry: Not on file     Inability: Not on file    Transportation needs:     Medical: Not on file     Non-medical: Not on file   Tobacco Use    Smoking status: Former Smoker     Packs/day: 0.50     Types: Vaping with nicotine    Smokeless tobacco: Former User   Substance and Sexual Activity    Alcohol use: Yes     Alcohol/week: 7.2 oz     Types: 12 Cans of beer per week     Comment: weekly    Drug use: No    Sexual activity: Not on file   Lifestyle    Physical activity:     Days per week: Not on file     Minutes per session: Not on file    Stress: Not on file   Relationships    Social connections:     Talks on phone: Not on file     Gets together: Not on file     Attends Anabaptism service: Not on file     Active member of club or organization: Not on file     Attends meetings of clubs or organizations: Not on file     Relationship status: Not on file   Other Topics Concern    Not on file   Social History Narrative    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          Medications/Allergies: See med card    Vitals:    04/17/19 1536   BP: (!) 144/68   Pulse: 77   Resp: 18   Temp: 97.1 °F (36.2 °C)   TempSrc: Oral   SpO2: 99%   Weight: 100.8 kg (222 lb 5.3 oz)   Height: 6' (1.829 m)   PainSc:   6   PainLoc: Neck         Physical exam:  Gen: A and O x3, pleasant, well-groomed  Skin: No rashes or obvious lesions  HEENT: PERRLA, no obvious deformities on ears or in canals.Trachea midline.  CVS: Regular rate and rhythm, normal palpable pulses.  Resp: Clear to auscultation bilaterally, no wheezes or rales.  Abdomen: Soft, NT/ND.  Musculoskeletal: Able to heel walk, toe walk. No antalgic gait.     Neuro:  Upper extremities: 5/5 strength bilaterally   Reflexes: Brachioradialis 1+, Bicep 1+, Tricep 1+.   Sensory: Intact and  symmetrical to light touch and pinprick in C2-T1 dermatomes bilaterally.    Cervical Spine:  Cervical spine: ROM is full in flexion, extension and lateral rotation with pain in the left scapular region during left lateral rotation but much worse with extension.  Spurling's maneuver causes no neck pain to either side.  Myofascial exam: No Tenderness to palpation across cervical paraspinous region bilaterally.  Mild tenderness to palpation to the left upper trapezius muscle but exquisite tenderness to palpation to the left scapular region.  Multiple trigger points are noted.      Imagin2019 CT cervical spine  Vertebral column: Redemonstrated are postsurgical changes of anterior interbody fusion of C5 and C6.  Fusion hardware does result in streak artifact.  There is no suspected loosening or infection.  The vertebral bodies maintain normal height and alignment.  The odontoid process is intact.  There is mild disc space narrowing at the C4-5 and C6-7 levels.  Spinal canal, cord, epidural space: The spinal canal is developmentally normal.  There is no obvious abnormal epidural mass or fluid collection.  Findings by level:  C1-2: Alignment is normal.  There is no significant joint space narrowing.  C2-3: There is no spinal canal or foraminal stenosis.  C3-4: There is a minimal disc bulge. There is mild bilateral uncovertebral spurring and mild facet joint arthropathy. There is no spinal stenosis. There may be mild right foraminal stenosis.  C4-5: There is no spinal canal or significant foraminal stenosis.  C5-6: There is bilateral uncovertebral spurring. There is a mild disc osteophyte. There is artifact related to fusion hardware. There is no significant spinal stenosis. There is mild-to-moderate right and mild left foraminal stenosis suspected.  C6-7: There is left greater than right uncovertebral spurring with mild facet joint arthropathy.  There is a mild disc osteophyte complex.  There is no spinal  stenosis.  There is mild bilateral foraminal stenosis.  C7-T1: There is mild facet joint arthropathy.  There is no spinal canal or significant foraminal stenosis.      Assessment:  The patient is a 48-year-old male with past medical history significant for C5/6 cervical spine surgery in 2008 who presents in referral from Bhavna Wild PA-C for left upper back pain.    1. Myofascial pain     2. DDD (degenerative disc disease), cervical           Plan:  1.  I discussed with the patient that his pain is likely myofascial and we discussed treatments for this include physical therapy, NSAIDs, massage and trigger point injections.  He would like to try the injections so we discussed the risks involved and I performed trigger point injections to the left trapezius muscle and rhomboids as noted below.  He reported relief during and after the procedure and we discussed stretching and icing the area this evening.  I also provided a prescription for nabumetone to help with inflammation and pain.  We discussed the risks of NSAIDs.  He will contact me and 2 weeks to let me know how he is doing.  I can repeat the trigger point injections in the future if necessary but if he is only having short-term relief we discussed getting involved in physical therapy to include dry needling for prolonged relief.    Trigger point injection   Pre-procedure diagnosis: Myofascial trigger points in the left trapezius and rhomboid region  Post-procedure diagnosis: Same    Upon examination, 10 trigger points were noted in the above noted regions.   After the procedure was described and informed consent obtained, the skin over the trigger points was cleaned with isopropyl alcohol. Using a 27-gauge needle, injection of 1ml of 1% lidocaine was injected into each trigger point. The patient noted concordant radiating pain upon injection of her trigger points. The skin was then cleaned and bandages were applied to sites as necessary. Blood loss was  <2ml. We observed the patient for 10 minutes after the procedure for any complications, and as there were none noted, the patient was then discharged home with instructions. We advised the patient that during the duration of the local anesthetic effect, this would be the optimal time to perform stretching exercises for the muscles which contain the trigger points. We also advised the patient to continue these exercises daily as this would likely give the best chance of resolution of the trigger points.    Thank you for referring this interesting patient, and I look forward to continuing to collaborate in his care.

## 2019-06-24 ENCOUNTER — TELEPHONE (OUTPATIENT)
Dept: FAMILY MEDICINE | Facility: CLINIC | Age: 49
End: 2019-06-24

## 2019-06-24 DIAGNOSIS — G47.33 OSA ON CPAP: Primary | ICD-10-CM

## 2019-06-24 NOTE — TELEPHONE ENCOUNTER
"Supplies pended. Please advise  LOV:2/28/19    Spoke to pt. Stated he "has sleep apnea.was seen 2 years ago and Needs     Protection tube   Mask   Head set".    Informed I would get that sent in to the doctor. Allow up to 72 business hours for review. Pt. Verbalized understanding. Phone call ended.  "

## 2019-06-24 NOTE — TELEPHONE ENCOUNTER
----- Message from Yordan Rowe sent at 6/24/2019  2:42 PM CDT -----  Contact: Patient  [phone]  Type: Needs Medical Advice    Who Called:  Patient  [phone]      Additional Information: Advised needs to speak with the nurse. Please call Patient  [phone].

## 2019-07-18 ENCOUNTER — HOSPITAL ENCOUNTER (OUTPATIENT)
Dept: RADIOLOGY | Facility: HOSPITAL | Age: 49
Discharge: HOME | End: 2019-07-18
Attending: NURSE PRACTITIONER
Payer: OTHER GOVERNMENT

## 2019-07-18 ENCOUNTER — TELEPHONE (OUTPATIENT)
Dept: FAMILY MEDICINE | Facility: CLINIC | Age: 49
End: 2019-07-18

## 2019-07-18 DIAGNOSIS — M79.674 PAIN AND SWELLING OF TOE OF RIGHT FOOT: Primary | ICD-10-CM

## 2019-07-18 DIAGNOSIS — M79.674 PAIN AND SWELLING OF TOE OF RIGHT FOOT: ICD-10-CM

## 2019-07-18 DIAGNOSIS — M79.89 PAIN AND SWELLING OF TOE OF RIGHT FOOT: ICD-10-CM

## 2019-07-18 DIAGNOSIS — M79.674 GREAT TOE PAIN, RIGHT: Primary | ICD-10-CM

## 2019-07-18 DIAGNOSIS — M79.89 PAIN AND SWELLING OF TOE OF RIGHT FOOT: Primary | ICD-10-CM

## 2019-07-18 DIAGNOSIS — S90.454A FOREIGN BODY OF TOE OF RIGHT FOOT, INITIAL ENCOUNTER: ICD-10-CM

## 2019-07-18 PROCEDURE — 73660 X-RAY EXAM OF TOE(S): CPT | Mod: 26,RT,, | Performed by: RADIOLOGY

## 2019-07-18 PROCEDURE — 73660 XR TOE 2 OR MORE VIEWS RIGHT: ICD-10-PCS | Mod: 26,RT,, | Performed by: RADIOLOGY

## 2019-07-18 PROCEDURE — 73660 X-RAY EXAM OF TOE(S): CPT | Mod: TC,FY,PO,RT

## 2019-07-18 NOTE — TELEPHONE ENCOUNTER
Spoke with patient, xray results reviewed. Refer to podiatry. Appointment made tomorrow at 1040 with Dr. Marsh

## 2019-07-18 NOTE — TELEPHONE ENCOUNTER
Phoned patient to inform provider expects to discuss results at scheduled appointment at 240pm. Patient states he was told by radiology tech that we would call him with results.

## 2019-07-18 NOTE — TELEPHONE ENCOUNTER
"Patient states he "jammed" his right big toe and reports pain. Requesting order to xray before visit at 240pm (appt time changed per patient request). Pt will come around 9am for xray. Please advise.    "

## 2019-07-18 NOTE — TELEPHONE ENCOUNTER
----- Message from Clemencia Dallas sent at 7/18/2019  2:15 PM CDT -----  Contact: self [phone]  Please call him with the xray results. Thank you!

## 2019-07-18 NOTE — TELEPHONE ENCOUNTER
----- Message from Clemencia Dallas sent at 7/18/2019  7:37 AM CDT -----  Contact: self [phone]  He would like to have xrays performed prior to him seeing you today.  He injured his right toe, caused by an impact. Please let him know if you will order them.  Thank you!

## 2019-07-19 ENCOUNTER — TELEPHONE (OUTPATIENT)
Dept: FAMILY MEDICINE | Facility: CLINIC | Age: 49
End: 2019-07-19

## 2019-07-19 DIAGNOSIS — G47.33 OSA ON CPAP: Primary | ICD-10-CM

## 2019-07-19 NOTE — TELEPHONE ENCOUNTER
----- Message from Aparna Ramirez sent at 7/18/2019  4:58 PM CDT -----  Regarding: Aparna with [employer]  I received an order for supplies, but the mask was changed by hand.  This will need to be correct in Epic.  Thank you

## 2019-07-30 ENCOUNTER — TELEPHONE (OUTPATIENT)
Dept: FAMILY MEDICINE | Facility: CLINIC | Age: 49
End: 2019-07-30

## 2019-07-30 NOTE — TELEPHONE ENCOUNTER
----- Message from Dana Graham sent at 7/30/2019  4:08 PM CDT -----  Contact: Joanne/CPAP Supply company  Type: Needs Medical Advice    Who Called:  Joanne  Symptoms (please be specific):  na  How long has patient had these symptoms:  hannah  Pharmacy name and phone #:  hannah  Best Call Back Number: [phone]  Additional Information: Joanne would like to speak with Ioana about patients CPAP supllies.  Please call to advise. Thanks!

## 2019-10-14 ENCOUNTER — OFFICE VISIT (OUTPATIENT)
Dept: FAMILY MEDICINE | Facility: CLINIC | Age: 49
End: 2019-10-14
Payer: OTHER GOVERNMENT

## 2019-10-14 VITALS
DIASTOLIC BLOOD PRESSURE: 88 MMHG | BODY MASS INDEX: 29.77 KG/M2 | WEIGHT: 219.81 LBS | SYSTOLIC BLOOD PRESSURE: 118 MMHG | HEIGHT: 72 IN | OXYGEN SATURATION: 97 % | HEART RATE: 78 BPM

## 2019-10-14 DIAGNOSIS — R68.84 PAIN IN LOWER JAW: Primary | ICD-10-CM

## 2019-10-14 PROCEDURE — 99214 PR OFFICE/OUTPT VISIT, EST, LEVL IV, 30-39 MIN: ICD-10-PCS | Mod: S$PBB,,, | Performed by: INTERNAL MEDICINE

## 2019-10-14 PROCEDURE — 99213 OFFICE O/P EST LOW 20 MIN: CPT | Mod: PBBFAC,PO | Performed by: INTERNAL MEDICINE

## 2019-10-14 PROCEDURE — 99999 PR PBB SHADOW E&M-EST. PATIENT-LVL III: ICD-10-PCS | Mod: PBBFAC,,, | Performed by: INTERNAL MEDICINE

## 2019-10-14 PROCEDURE — 99999 PR PBB SHADOW E&M-EST. PATIENT-LVL III: CPT | Mod: PBBFAC,,, | Performed by: INTERNAL MEDICINE

## 2019-10-14 PROCEDURE — 99214 OFFICE O/P EST MOD 30 MIN: CPT | Mod: S$PBB,,, | Performed by: INTERNAL MEDICINE

## 2019-10-14 NOTE — PROGRESS NOTES
Subjective:       Patient ID: Raciel White is a 48 y.o. male.    Chief Complaint: Jaw Pain    Patient complains of mild but almost constant left jaw pain for 3 weeks.  Worse with opening his mouth.  No pain with chewing or eating.  No ear ache.  No fever or chills, redness or swelling     Review of Systems   Constitutional: Negative for appetite change and fever.   HENT: Negative for nosebleeds and trouble swallowing.    Eyes: Negative for discharge and visual disturbance.   Respiratory: Negative for choking and shortness of breath.    Cardiovascular: Negative for chest pain and palpitations.   Gastrointestinal: Negative for abdominal pain, nausea and vomiting.   Musculoskeletal: Negative for arthralgias and joint swelling.   Skin: Negative for rash and wound.   Neurological: Negative for dizziness and syncope.   Psychiatric/Behavioral: Negative for confusion and dysphoric mood.       Objective:      Vitals:    10/14/19 1350   BP: 118/88   Pulse: 78     Physical Exam   Constitutional: He appears well-nourished.   HENT:   Mouth clear with no lesions  Ears clear; b/l TM wnl    Eyes: Conjunctivae and EOM are normal.   Neck: Normal range of motion.   Pulmonary/Chest: Effort normal.   Musculoskeletal:   Normal ROM bilateral   Left jaw no TTP.  Mild enlargement vs right.    TMJ non TTP   Neurological: No cranial nerve deficit (grossly intact).   Skin: Skin is warm and dry.   Psychiatric: He has a normal mood and affect.   Alert and orientated   Vitals reviewed.        Assessment:       1. Pain in lower jaw        Plan:       Pain in lower jaw            Medication List with Changes/Refills   Current Medications    ACETAMINOPHEN (TYLENOL) 325 MG TABLET    Take 2 tablets (650 mg total) by mouth every 4 (four) hours as needed.    AMITRIPTYLINE (ELAVIL) 10 MG TABLET    TAKE 1 TABLET NIGHTLY AS NEEDED    FLUTICASONE (FLONASE) 50 MCG/ACTUATION NASAL SPRAY    1 spray by Each Nare route once daily.    METHYLPREDNISOLONE (MEDROL  DOSEPACK) 4 MG TABLET    use as directed    MULTIVITAMIN CAPSULE    Take 1 capsule by mouth once daily.    NABUMETONE (RELAFEN) 750 MG TABLET    Take 1 tablet (750 mg total) by mouth 2 (two) times daily as needed for Pain.    NAPROXEN SODIUM (ANAPROX) 220 MG TABLET    Take 220 mg by mouth every 12 (twelve) hours.    NEXIUM 20 MG CAPSULE    TAKE 1 CAPSULE DAILY    TIZANIDINE (ZANAFLEX) 4 MG TABLET    Take 1 tablet by mouth 3 times per day as needed for muscle spasm.     Use rx Tizanidine and Naproxen have at home.  Nw in 5-7 days, consider Clindamycin.  Continue current management and monitor.    Counseled on regular exercise, maintenance of a healthy weight, balanced diet rich in fruits/vegetables and lean protein, and avoidance of unhealthy habits like smoking and excessive alcohol intake.   Also, counseled on importance of being compliant with medication, health appointments, diet and exercise.     Follow up if symptoms worsen or fail to improve.

## 2019-11-04 ENCOUNTER — OFFICE VISIT (OUTPATIENT)
Dept: FAMILY MEDICINE | Facility: CLINIC | Age: 49
End: 2019-11-04
Payer: OTHER GOVERNMENT

## 2019-11-04 VITALS
WEIGHT: 218.94 LBS | DIASTOLIC BLOOD PRESSURE: 82 MMHG | SYSTOLIC BLOOD PRESSURE: 130 MMHG | TEMPERATURE: 99 F | HEART RATE: 87 BPM | OXYGEN SATURATION: 97 % | HEIGHT: 72 IN | BODY MASS INDEX: 29.65 KG/M2

## 2019-11-04 DIAGNOSIS — I78.1 SPIDER VEINS OF LIMB: ICD-10-CM

## 2019-11-04 DIAGNOSIS — M21.611 BUNION, RIGHT: ICD-10-CM

## 2019-11-04 DIAGNOSIS — G89.29 CHRONIC PAIN OF BOTH SHOULDERS: Primary | ICD-10-CM

## 2019-11-04 DIAGNOSIS — M25.512 CHRONIC PAIN OF BOTH SHOULDERS: Primary | ICD-10-CM

## 2019-11-04 DIAGNOSIS — Z00.00 WELL ADULT EXAM: ICD-10-CM

## 2019-11-04 DIAGNOSIS — M25.511 CHRONIC PAIN OF BOTH SHOULDERS: Primary | ICD-10-CM

## 2019-11-04 DIAGNOSIS — G89.29 CHRONIC LEFT-SIDED LOW BACK PAIN WITH LEFT-SIDED SCIATICA: ICD-10-CM

## 2019-11-04 DIAGNOSIS — R21 RASH OF HANDS: ICD-10-CM

## 2019-11-04 DIAGNOSIS — M54.42 CHRONIC LEFT-SIDED LOW BACK PAIN WITH LEFT-SIDED SCIATICA: ICD-10-CM

## 2019-11-04 PROCEDURE — 99999 PR PBB SHADOW E&M-EST. PATIENT-LVL V: CPT | Mod: PBBFAC,,, | Performed by: NURSE PRACTITIONER

## 2019-11-04 PROCEDURE — 99215 OFFICE O/P EST HI 40 MIN: CPT | Mod: PBBFAC,PO | Performed by: NURSE PRACTITIONER

## 2019-11-04 PROCEDURE — 99396 PR PREVENTIVE VISIT,EST,40-64: ICD-10-PCS | Mod: 25,S$PBB,, | Performed by: NURSE PRACTITIONER

## 2019-11-04 PROCEDURE — 99396 PREV VISIT EST AGE 40-64: CPT | Mod: 25,S$PBB,, | Performed by: NURSE PRACTITIONER

## 2019-11-04 PROCEDURE — 99999 PR PBB SHADOW E&M-EST. PATIENT-LVL V: ICD-10-PCS | Mod: PBBFAC,,, | Performed by: NURSE PRACTITIONER

## 2019-11-04 RX ORDER — TRIAMCINOLONE ACETONIDE 0.25 MG/G
CREAM TOPICAL 2 TIMES DAILY
Qty: 15 G | Refills: 1 | Status: SHIPPED | OUTPATIENT
Start: 2019-11-04 | End: 2022-04-21

## 2019-11-04 NOTE — PATIENT INSTRUCTIONS
For the bunion and itching--podiatry referral.  For the low back pain--back and spine clinic, as needed.  For the itching on the hand--triamcinolone cream to the area twice a day, as needed.  For the shoulder pain--ortho referral.    Labs for well visit 10 hrs fasting.    If you have any questions, please call.  You can reach us at [phone] Monday through Thursday (except holidays) 8:30 a.m. to 5 p.m. or call Dr. Wong/NP Christal Do     Note:  I do not work on Fridays.  So if you have concerns or questions, please contact your primary care provider team or [facility] or go to the Urgent Care on Friday for concerns.     Thank you for using our Primary Care Service!    RUFINA Lincoln, CNP, YANGP-BC [facility]    To rate your experience with ELIZA Lincoln, click on the link below:      [email or web address]

## 2019-11-04 NOTE — PROGRESS NOTES
"Raciel White is a 48 y.o. male patient of Dr. Camron Wong MD who presents to the clinic today for   Chief Complaint   Patient presents with    Low-back Pain    Shoulder Pain     RIGHT    .    HPI      Pt, who is not known to me, reports today for new problems:  Has several concerns.  1)  Varicose veins--several spots on face and on legs.  Having fatigue in his legs.  Sits at the office most of the day.  Has the feeling in the morning and late afternoon, bilat, may be r/t to the sciatica.  2)  Spot on the right 3rd finger.  Started when he started to have problems with his feet.  Derm told him both were allergies.  Would like treatment for it.  3)  Right shoulder Pain in the shoulder, to a lesser extent on the left.  Works at a desk.  No vigorous leisure activities.  Has had pain in the past--2010--but that was radiating from the neck.  4)  Low left back pain with sciatica--has had for a long time (18-20 yrs), uses flexeril and naproxen, TENS, heating pad "to get by".  Has been in about 1.5 yrs of PT.  Does the exercises he learned at home now.  5)  Bunion on the right great toe, causing pain.      Interval Hx:  See above    Pt denies the following symptoms:  weakness    OTC Medications tried are NSAIDs.    Pertinent medical history:  Disc replacement in cervical spine    ROS    Constitutional:  No fever, + fatigue.    HEENT:  Negative    Heart/CV:  No palpitations, CP or edema.    GI:  No abd pain, diarrhea or constipation    :  No urinary complaints    MS:  No new bone, joint or muscle complaints.    Skin:  No rashes, itching..    Past Medical History:   Diagnosis Date    GERD (gastroesophageal reflux disease)     Insomnia     Sleep apnea     cpap       Current Outpatient Medications:     acetaminophen (TYLENOL) 325 MG tablet, Take 2 tablets (650 mg total) by mouth every 4 (four) hours as needed., Disp: , Rfl: 0    amitriptyline (ELAVIL) 10 MG tablet, TAKE 1 TABLET NIGHTLY AS NEEDED, Disp: 90 " tablet, Rfl: 3    fluticasone (FLONASE) 50 mcg/actuation nasal spray, 1 spray by Each Nare route once daily., Disp: 1 Bottle, Rfl: 4    multivitamin capsule, Take 1 capsule by mouth once daily., Disp: , Rfl:     NEXIUM 20 mg capsule, TAKE 1 CAPSULE DAILY, Disp: 90 capsule, Rfl: 3    tiZANidine (ZANAFLEX) 4 MG tablet, Take 1 tablet by mouth 3 times per day as needed for muscle spasm., Disp: 40 tablet, Rfl: 0    naproxen sodium (ANAPROX) 220 MG tablet, Take 220 mg by mouth every 12 (twelve) hours., Disp: , Rfl:   No current facility-administered medications for this visit.     Facility-Administered Medications Ordered in Other Visits:     lactated ringers infusion, , Intravenous, Continuous, Jaime Valdez MD, Stopped at 11/02/18 0906    Patient is currently a smoker.    PHYSICAL EXAM    Alert, coop 48 y.o. male patient in no acute distress.    Vitals:    11/04/19 1558   BP: 130/82   Pulse: 87   Temp: 98.7 °F (37.1 °C)     VS reviewed.  VS stable.  CC, nursing note, medications & PMH all reviewed today.    Head:  Normocephalic, atraumatic.    EENT: Ext nose/ears normal.  Eyes with normal lids, not injected.           Resp:  Respirations even, unlabored.               Lungs CTA bilat.    Heart:  RRR, no murmur  CV:  Cap RF brisk, post tib pulses 2+ bilat, radial pulses 3+ bilat.            Carotids w/o bruits bilat.          Ra    MS:  Walks with steady gait, arm movement smooth and symmetrical.          The shoulders of the bilat (R>L) UE is/are noted to have no edema, no erythema, no ecchymosis.  The affected area is tender to palp in the glenohumeral joint and medially over the top of the shoulder.  Pain elicited with int/sup rotation .  Pain is is noted in the post joint(s).  ROM of the affected area is intact.  ROM of the UEs is symmetrical.  Strength with abduction/adducion of the UEs is 5/5 and is symmetrical.  Strength with flexion/extension of the elbows w/resistance intact as well.   are  symmetrical.  Right great toe MTP enlarged.            NEURO:  Alert and oriented x 4.  Responds appropriately during interaction.                  Skin:  Warm, dry, color good.            Right 3rd digit, medial phalynx, lat aspect with small area of dry skin.            Soles of bilat feet without rash.              Bilat cheeks with several visible veins, small.            Left leg with small, blue superficial veins near the medial popliteal area.      Psych:  Responds appropriately throughout the visit.               Relaxed.  Well-groomed.            .Chronic pain of both shoulders  -     Ambulatory referral/consult to Orthopedics; Future; Expected date: 11/04/2019    Chronic left-sided low back pain with left-sided sciatica    Rash of hands  -     triamcinolone acetonide 0.025% (KENALOG) 0.025 % cream; Apply topically 2 (two) times daily. Apply to the affected area sparingly as needed twice a day.  Dispense: 15 g; Refill: 1    Bunion, right  -     Ambulatory referral to Podiatry    Well adult exam  -     Comprehensive metabolic panel; Future; Expected date: 11/04/2019  -     CBC auto differential; Future; Expected date: 11/04/2019  -     Lipid panel; Future; Expected date: 11/04/2019  -     TSH; Future; Expected date: 11/04/2019    Spider veins of limb        Pt today presents with multiple concerns and to have blood checked routinely.  Both shoulder and low back pain have been chronic with recurrent exacerbations.   He has been to the back and spine center for treatment.  He has also seen pain management for trigger point injections.  There is tenderness in the R>L posterior shoulder joints but ROM, strength intact.  See above for more exam details.  He also desires well adult check today.    This is a new problem to me and the following work up is planned.    Lab & Radiological Tests Ordered: CMP, CBC, lipid panel.  The results are pending.    Pt advised to perform comfort measures recommended on patient  instruction sheet .    F/u with PCP team and specialists as needed.  Explained exam findings, diagnosis and treatment plan to patient.  Questions answered and patient states understanding.

## 2019-11-06 DIAGNOSIS — M25.512 BILATERAL SHOULDER PAIN, UNSPECIFIED CHRONICITY: Primary | ICD-10-CM

## 2019-11-06 DIAGNOSIS — M25.511 BILATERAL SHOULDER PAIN, UNSPECIFIED CHRONICITY: Primary | ICD-10-CM

## 2019-11-08 ENCOUNTER — TELEPHONE (OUTPATIENT)
Dept: FAMILY MEDICINE | Facility: CLINIC | Age: 49
End: 2019-11-08

## 2019-11-08 ENCOUNTER — LAB VISIT (OUTPATIENT)
Dept: LAB | Facility: HOSPITAL | Age: 49
End: 2019-11-08
Attending: NURSE PRACTITIONER
Payer: OTHER GOVERNMENT

## 2019-11-08 ENCOUNTER — TELEPHONE (OUTPATIENT)
Dept: OTOLARYNGOLOGY | Facility: CLINIC | Age: 49
End: 2019-11-08

## 2019-11-08 DIAGNOSIS — Z00.00 WELL ADULT EXAM: ICD-10-CM

## 2019-11-08 DIAGNOSIS — G47.33 OSA (OBSTRUCTIVE SLEEP APNEA): Primary | ICD-10-CM

## 2019-11-08 LAB
ALBUMIN SERPL BCP-MCNC: 4.2 G/DL (ref 3.5–5.2)
ALP SERPL-CCNC: 47 U/L (ref 55–135)
ALT SERPL W/O P-5'-P-CCNC: 21 U/L (ref 10–44)
ANION GAP SERPL CALC-SCNC: 10 MMOL/L (ref 8–16)
AST SERPL-CCNC: 16 U/L (ref 10–40)
BASOPHILS # BLD AUTO: 0.04 K/UL (ref 0–0.2)
BASOPHILS NFR BLD: 0.6 % (ref 0–1.9)
BILIRUB SERPL-MCNC: 0.5 MG/DL (ref 0.1–1)
BUN SERPL-MCNC: 22 MG/DL (ref 6–20)
CALCIUM SERPL-MCNC: 9.5 MG/DL (ref 8.7–10.5)
CHLORIDE SERPL-SCNC: 105 MMOL/L (ref 95–110)
CHOLEST SERPL-MCNC: 173 MG/DL (ref 120–199)
CHOLEST/HDLC SERPL: 2.7 {RATIO} (ref 2–5)
CO2 SERPL-SCNC: 27 MMOL/L (ref 23–29)
CREAT SERPL-MCNC: 1.1 MG/DL (ref 0.5–1.4)
DIFFERENTIAL METHOD: ABNORMAL
EOSINOPHIL # BLD AUTO: 0.1 K/UL (ref 0–0.5)
EOSINOPHIL NFR BLD: 1.7 % (ref 0–8)
ERYTHROCYTE [DISTWIDTH] IN BLOOD BY AUTOMATED COUNT: 12.6 % (ref 11.5–14.5)
EST. GFR  (AFRICAN AMERICAN): >60 ML/MIN/1.73 M^2
EST. GFR  (NON AFRICAN AMERICAN): >60 ML/MIN/1.73 M^2
GLUCOSE SERPL-MCNC: 103 MG/DL (ref 70–110)
HCT VFR BLD AUTO: 44.5 % (ref 40–54)
HDLC SERPL-MCNC: 64 MG/DL (ref 40–75)
HDLC SERPL: 37 % (ref 20–50)
HGB BLD-MCNC: 14.3 G/DL (ref 14–18)
IMM GRANULOCYTES # BLD AUTO: 0.03 K/UL (ref 0–0.04)
IMM GRANULOCYTES NFR BLD AUTO: 0.5 % (ref 0–0.5)
LDLC SERPL CALC-MCNC: 90.4 MG/DL (ref 63–159)
LYMPHOCYTES # BLD AUTO: 1.9 K/UL (ref 1–4.8)
LYMPHOCYTES NFR BLD: 28.5 % (ref 18–48)
MCH RBC QN AUTO: 31.2 PG (ref 27–31)
MCHC RBC AUTO-ENTMCNC: 32.1 G/DL (ref 32–36)
MCV RBC AUTO: 97 FL (ref 82–98)
MONOCYTES # BLD AUTO: 0.7 K/UL (ref 0.3–1)
MONOCYTES NFR BLD: 9.8 % (ref 4–15)
NEUTROPHILS # BLD AUTO: 3.9 K/UL (ref 1.8–7.7)
NEUTROPHILS NFR BLD: 58.9 % (ref 38–73)
NONHDLC SERPL-MCNC: 109 MG/DL
NRBC BLD-RTO: 0 /100 WBC
PLATELET # BLD AUTO: 278 K/UL (ref 150–350)
PMV BLD AUTO: 10.9 FL (ref 9.2–12.9)
POTASSIUM SERPL-SCNC: 4.5 MMOL/L (ref 3.5–5.1)
PROT SERPL-MCNC: 7.1 G/DL (ref 6–8.4)
RBC # BLD AUTO: 4.58 M/UL (ref 4.6–6.2)
SODIUM SERPL-SCNC: 142 MMOL/L (ref 136–145)
TRIGL SERPL-MCNC: 93 MG/DL (ref 30–150)
TSH SERPL DL<=0.005 MIU/L-ACNC: 0.93 UIU/ML (ref 0.4–4)
WBC # BLD AUTO: 6.66 K/UL (ref 3.9–12.7)

## 2019-11-08 PROCEDURE — 84443 ASSAY THYROID STIM HORMONE: CPT

## 2019-11-08 PROCEDURE — 36415 COLL VENOUS BLD VENIPUNCTURE: CPT | Mod: PO

## 2019-11-08 PROCEDURE — 80061 LIPID PANEL: CPT

## 2019-11-08 PROCEDURE — 85025 COMPLETE CBC W/AUTO DIFF WBC: CPT

## 2019-11-08 PROCEDURE — 80053 COMPREHEN METABOLIC PANEL: CPT

## 2019-11-08 NOTE — TELEPHONE ENCOUNTER
----- Message from Linda Kuhn sent at 11/8/2019  3:26 PM CST -----  Contact: patient  Patient was told to call by another physician... That was all the info I was given.    Call back at: [phone]

## 2019-11-08 NOTE — TELEPHONE ENCOUNTER
Pt calling requesting replacement cpap supplies, advised pt that we do not have a sleep medicine provider at [facility] [place]. Advised pt to contact Dr. Roxanna Hammond at [facility]. Pt states he needs a referral from Dr. Wong, pt requested to be transferred back to the . Transferred pt.

## 2019-11-08 NOTE — TELEPHONE ENCOUNTER
----- Message from Ramandeep Leon sent at 11/8/2019  3:12 PM CST -----  Contact: Pt  Type: Needs Medical Advice    Who Called:  Pt  Best Call Back Number: [phone]  Additional Information: Requesting a call back regarding pt sleep equipment   Please Advise ---Thank you

## 2019-11-12 ENCOUNTER — TELEPHONE (OUTPATIENT)
Dept: ORTHOPEDICS | Facility: CLINIC | Age: 49
End: 2019-11-12

## 2019-11-12 NOTE — TELEPHONE ENCOUNTER
Spoke to patient. Rescheduled him to Thursday at 315pm with Dr. Chavira. Patient stated understanding.

## 2019-11-12 NOTE — TELEPHONE ENCOUNTER
Tried calling pt regarding appt tomorrow with RUFINA Brian. Ms. Whitlock feels that pt needs to see the Orthopaedic Surgeon, not another Nurse Practitioner. No answer and voicemail is full. Will try again later.

## 2019-11-13 ENCOUNTER — OFFICE VISIT (OUTPATIENT)
Dept: PODIATRY | Facility: CLINIC | Age: 49
End: 2019-11-13
Payer: OTHER GOVERNMENT

## 2019-11-13 ENCOUNTER — HOSPITAL ENCOUNTER (OUTPATIENT)
Dept: RADIOLOGY | Facility: HOSPITAL | Age: 49
Discharge: HOME | End: 2019-11-13
Attending: PODIATRIST
Payer: OTHER GOVERNMENT

## 2019-11-13 VITALS
HEIGHT: 72 IN | SYSTOLIC BLOOD PRESSURE: 121 MMHG | BODY MASS INDEX: 29.65 KG/M2 | DIASTOLIC BLOOD PRESSURE: 88 MMHG | HEART RATE: 72 BPM | WEIGHT: 218.94 LBS

## 2019-11-13 DIAGNOSIS — M20.11 HAV (HALLUX ABDUCTO VALGUS), RIGHT: ICD-10-CM

## 2019-11-13 DIAGNOSIS — M20.11 HAV (HALLUX ABDUCTO VALGUS), RIGHT: Primary | ICD-10-CM

## 2019-11-13 PROCEDURE — 73630 X-RAY EXAM OF FOOT: CPT | Mod: 26,RT,, | Performed by: RADIOLOGY

## 2019-11-13 PROCEDURE — 73630 XR FOOT COMPLETE 3 VIEW RIGHT: ICD-10-PCS | Mod: 26,RT,, | Performed by: RADIOLOGY

## 2019-11-13 PROCEDURE — 99203 PR OFFICE/OUTPT VISIT, NEW, LEVL III, 30-44 MIN: ICD-10-PCS | Mod: S$PBB,,, | Performed by: PODIATRIST

## 2019-11-13 PROCEDURE — 99999 PR PBB SHADOW E&M-EST. PATIENT-LVL III: CPT | Mod: PBBFAC,,, | Performed by: PODIATRIST

## 2019-11-13 PROCEDURE — 73630 X-RAY EXAM OF FOOT: CPT | Mod: TC,FY,PO,RT

## 2019-11-13 PROCEDURE — 99999 PR PBB SHADOW E&M-EST. PATIENT-LVL III: ICD-10-PCS | Mod: PBBFAC,,, | Performed by: PODIATRIST

## 2019-11-13 PROCEDURE — 99213 OFFICE O/P EST LOW 20 MIN: CPT | Mod: PBBFAC,25,PN | Performed by: PODIATRIST

## 2019-11-13 PROCEDURE — 99203 OFFICE O/P NEW LOW 30 MIN: CPT | Mod: S$PBB,,, | Performed by: PODIATRIST

## 2019-11-13 NOTE — LETTER
November 24, 2019      Ember Graff, DUSTIN  [address] [facility] [address] [place] [address]           [facility]  [address] [place] [address]  Phone: [phone]          Patient: Raciel White   MR Number: [id number]   YOB: 1970   Date of Visit: 11/13/2019       Dear Ember Graff:    Thank you for referring Raciel White to me for evaluation. Attached you will find relevant portions of my assessment and plan of care.    If you have questions, please do not hesitate to call me. I look forward to following Raciel White along with you.    Sincerely,    Cooper Carver, KANIKA    Enclosure  CC:  No Recipients    If you would like to receive this communication electronically, please contact [email or web address] or [phone] to request more information on [employer] Link access.    For providers and/or their staff who would like to refer a patient to [facility], please contact us through our one-stop-shop provider referral line, Arlene Banerjee, at [phone].    If you feel you have received this communication in error or would no longer like to receive these types of communications, please e-mail [email or web address]

## 2019-11-14 ENCOUNTER — OFFICE VISIT (OUTPATIENT)
Dept: ORTHOPEDICS | Facility: CLINIC | Age: 49
End: 2019-11-14
Payer: OTHER GOVERNMENT

## 2019-11-14 VITALS — HEIGHT: 72 IN | WEIGHT: 218 LBS | BODY MASS INDEX: 29.53 KG/M2

## 2019-11-14 DIAGNOSIS — M54.12 CERVICAL RADICULOPATHY: Primary | ICD-10-CM

## 2019-11-14 DIAGNOSIS — M25.512 CHRONIC PAIN OF BOTH SHOULDERS: ICD-10-CM

## 2019-11-14 DIAGNOSIS — M25.519 SHOULDER PAIN, UNSPECIFIED CHRONICITY, UNSPECIFIED LATERALITY: Primary | ICD-10-CM

## 2019-11-14 DIAGNOSIS — G89.29 CHRONIC PAIN OF BOTH SHOULDERS: ICD-10-CM

## 2019-11-14 DIAGNOSIS — M25.511 CHRONIC PAIN OF BOTH SHOULDERS: ICD-10-CM

## 2019-11-14 PROCEDURE — 20610 DRAIN/INJ JOINT/BURSA W/O US: CPT | Mod: PBBFAC,PN | Performed by: ORTHOPAEDIC SURGERY

## 2019-11-14 PROCEDURE — 99999 PR PBB SHADOW E&M-EST. PATIENT-LVL III: ICD-10-PCS | Mod: PBBFAC,,, | Performed by: ORTHOPAEDIC SURGERY

## 2019-11-14 PROCEDURE — 99214 PR OFFICE/OUTPT VISIT, EST, LEVL IV, 30-39 MIN: ICD-10-PCS | Mod: 25,S$PBB,, | Performed by: ORTHOPAEDIC SURGERY

## 2019-11-14 PROCEDURE — 99214 OFFICE O/P EST MOD 30 MIN: CPT | Mod: 25,S$PBB,, | Performed by: ORTHOPAEDIC SURGERY

## 2019-11-14 PROCEDURE — 99999 PR PBB SHADOW E&M-EST. PATIENT-LVL III: CPT | Mod: PBBFAC,,, | Performed by: ORTHOPAEDIC SURGERY

## 2019-11-14 PROCEDURE — 20610 LARGE JOINT ASPIRATION/INJECTION: R SUBACROMIAL BURSA: ICD-10-PCS | Mod: S$PBB,RT,, | Performed by: ORTHOPAEDIC SURGERY

## 2019-11-14 PROCEDURE — 99213 OFFICE O/P EST LOW 20 MIN: CPT | Mod: PBBFAC,PN | Performed by: ORTHOPAEDIC SURGERY

## 2019-11-14 RX ORDER — TRIAMCINOLONE ACETONIDE 40 MG/ML
80 INJECTION, SUSPENSION INTRA-ARTICULAR; INTRAMUSCULAR
Status: DISCONTINUED | OUTPATIENT
Start: 2019-11-14 | End: 2019-11-14 | Stop reason: HOSPADM

## 2019-11-14 RX ADMIN — TRIAMCINOLONE ACETONIDE 80 MG: 40 INJECTION, SUSPENSION INTRA-ARTICULAR; INTRAMUSCULAR at 03:11

## 2019-11-14 NOTE — Clinical Note
November 14, 2019      Ember Graff NP  [address] [facility] [place] [address]           [facility]  [address] [place] [address]  Phone: [phone]          Patient: Raciel White   MR Number: [id number]   YOB: 1970   Date of Visit: 11/14/2019       Dear Ember Graff:    Thank you for referring Raciel White to me for evaluation. Attached you will find relevant portions of my assessment and plan of care.    If you have questions, please do not hesitate to call me. I look forward to following Raciel White along with you.    Sincerely,    Hermann Chavira MD    Enclosure  CC:  No Recipients    If you would like to receive this communication electronically, please contact [email or web address] or [phone] to request more information on [employer] Link access.    For providers and/or their staff who would like to refer a patient to [facility], please contact us through our one-stop-shop provider referral line, Arlene Banerjee, at [phone].    If you feel you have received this communication in error or would no longer like to receive these types of communications, please e-mail [email or web address]

## 2019-11-14 NOTE — PROGRESS NOTES
HISTORY OF PRESENT ILLNESS:  49 years old, complaining of bilateral shoulder   pain for a year, came on gradually.  No trauma.  Aching sharp pain, 6/10 on good   days, 10/10 on bad days.  Pretty much hurts him all the day.  Certain activity   makes it worse.  He does have a history of C-spine surgery.    PHYSICAL EXAMINATION:  Today shows cervical motion does seem to reproduce his   symptoms, particularly on the right side.    ASSESSMENT:  Shoulder pain, presumed cervical radicular symptoms.    PLAN:  We did do an impingement test on the right, injected right shoulder   subacromial space with lidocaine, Marcaine, and Kenalog, will see her response   and then treat accordingly.        PBB/HN  dd: 11/14/2019 16:16:57 (CST)  td: 11/14/2019 23:28:39 (CST)  Doc ID   #[id number]  Job ID #[id number]    CC:     Further History  Aching pain  Worse with activity  Relieved with rest  No other associated symptoms  No other radiation    Further Exam  Alert and oriented  Pleasant  Contralateral limb has appropriate range of motion for age and condition  Contralateral limb has appropriate strength for age and condition  Contralateral limb has appropriate stability  for age and condition  No adenopathy  Pulses are appropriate for current condition  Skin is intact        Chief Complaint    Chief Complaint   Patient presents with    Right Shoulder - Pain    Left Shoulder - Pain       HPI  Raciel White is a 49 y.o.  male who presents with       Past Medical History  Past Medical History:   Diagnosis Date    GERD (gastroesophageal reflux disease)     Insomnia     Sleep apnea     cpap       Past Surgical History  Past Surgical History:   Procedure Laterality Date    COLONOSCOPY  2011    LARYNGOSCOPY Bilateral 11/2/2018    Procedure: Sleep endoscopy;  Surgeon: Brandin Rosen MD;  [facility];  Service: ENT;  Laterality: Bilateral;    MYOTOMY AND SUSPENSION OF HYOID Bilateral 9/27/2018    Procedure: Hyoid suspension;  Surgeon:  Brandin Rosen MD;  Location: [facility];  Service: ENT;  Laterality: Bilateral;    SPINE SURGERY  2008    Neck fusion C6/7??       Medications  Current Outpatient Medications   Medication Sig    acetaminophen (TYLENOL) 325 MG tablet Take 2 tablets (650 mg total) by mouth every 4 (four) hours as needed.    amitriptyline (ELAVIL) 10 MG tablet TAKE 1 TABLET NIGHTLY AS NEEDED    fluticasone (FLONASE) 50 mcg/actuation nasal spray 1 spray by Each Nare route once daily.    multivitamin capsule Take 1 capsule by mouth once daily.    naproxen sodium (ANAPROX) 220 MG tablet Take 220 mg by mouth every 12 (twelve) hours.    NEXIUM 20 mg capsule TAKE 1 CAPSULE DAILY    tiZANidine (ZANAFLEX) 4 MG tablet Take 1 tablet by mouth 3 times per day as needed for muscle spasm.    triamcinolone acetonide 0.025% (KENALOG) 0.025 % cream Apply topically 2 (two) times daily. Apply to the affected area sparingly as needed twice a day.     No current facility-administered medications for this visit.      Facility-Administered Medications Ordered in Other Visits   Medication    lactated ringers infusion       Allergies  Review of patient's allergies indicates:  No Known Allergies    Family History  Family History   Problem Relation Age of Onset    Hypertension Paternal Grandfather     Diabetes Neg Hx     Cancer Neg Hx     Glaucoma Neg Hx     Macular degeneration Neg Hx     Retinal detachment Neg Hx        Social History  Social History     Socioeconomic History    Marital status:      Spouse name: Not on file    Number of children: Not on file    Years of education: Not on file    Highest education level: Not on file   Occupational History    Not on file   Social Needs    Financial resource strain: Not on file    Food insecurity:     Worry: Not on file     Inability: Not on file    Transportation needs:     Medical: Not on file     Non-medical: Not on file   Tobacco Use    Smoking status: Former Smoker      Packs/day: 0.50     Types: Vaping with nicotine    Smokeless tobacco: Former User   Substance and Sexual Activity    Alcohol use: Yes     Alcohol/week: 12.0 standard drinks     Types: 12 Cans of beer per week     Comment: weekly    Drug use: No    Sexual activity: Not on file   Lifestyle    Physical activity:     Days per week: Not on file     Minutes per session: Not on file    Stress: Not on file   Relationships    Social connections:     Talks on phone: Not on file     Gets together: Not on file     Attends Jain service: Not on file     Active member of club or organization: Not on file     Attends meetings of clubs or organizations: Not on file     Relationship status: Not on file   Other Topics Concern    Not on file   Social History Narrative    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                Review of Systems     Constitutional: Negative    HENT: Negative  Eyes: Negative  Respiratory: Negative  Cardiovascular: Negative  Musculoskeletal: HPI  Skin: Negative  Neurological: Negative  Hematological: Negative  Endocrine: Negative                 Physical Exam    There were no vitals filed for this visit.  Body mass index is 29.57 kg/m².  Physical Examination:     General appearance -  well appearing, and in no distress  Mental status - awake  Neck - supple  Chest -  symmetric air entry  Heart - normal rate   Abdomen - soft      Assessment     1. Cervical radiculopathy    2. Chronic pain of both shoulders          Plan

## 2019-11-14 NOTE — PROCEDURES
Large Joint Aspiration/Injection: R subacromial bursa  Date/Time: 11/14/2019 3:15 PM  Performed by: Hermann Chavira MD  Authorized by: Hermann Chavira MD     Consent Done?:  Yes (Verbal)  Indications:  Pain  Procedure site marked: Yes    Timeout: Prior to procedure the correct patient, procedure, and site was verified      Location:  Shoulder  Site:  R subacromial bursa  Prep: Patient was prepped and draped in usual sterile fashion    Needle size:  21 G  Ultrasonic Guidance for needle placement: No  Approach:  Posterior  Medications:  80 mg triamcinolone acetonide 40 mg/mL  Patient tolerance:  Patient tolerated the procedure well with no immediate complications

## 2019-11-19 ENCOUNTER — OFFICE VISIT (OUTPATIENT)
Dept: SPINE | Facility: CLINIC | Age: 49
End: 2019-11-19
Payer: OTHER GOVERNMENT

## 2019-11-19 VITALS
HEIGHT: 72 IN | SYSTOLIC BLOOD PRESSURE: 125 MMHG | WEIGHT: 218.06 LBS | BODY MASS INDEX: 29.54 KG/M2 | DIASTOLIC BLOOD PRESSURE: 80 MMHG | HEART RATE: 96 BPM

## 2019-11-19 DIAGNOSIS — M54.42 CHRONIC BILATERAL LOW BACK PAIN WITH LEFT-SIDED SCIATICA: ICD-10-CM

## 2019-11-19 DIAGNOSIS — Z98.890 H/O CERVICAL SPINE SURGERY: ICD-10-CM

## 2019-11-19 DIAGNOSIS — G89.29 CHRONIC BILATERAL LOW BACK PAIN WITH LEFT-SIDED SCIATICA: ICD-10-CM

## 2019-11-19 DIAGNOSIS — M54.2 CERVICALGIA: Primary | ICD-10-CM

## 2019-11-19 DIAGNOSIS — M54.12 CERVICAL RADICULOPATHY: ICD-10-CM

## 2019-11-19 PROCEDURE — 99214 PR OFFICE/OUTPT VISIT, EST, LEVL IV, 30-39 MIN: ICD-10-PCS | Mod: S$PBB,,, | Performed by: PHYSICIAN ASSISTANT

## 2019-11-19 PROCEDURE — 99214 OFFICE O/P EST MOD 30 MIN: CPT | Mod: S$PBB,,, | Performed by: PHYSICIAN ASSISTANT

## 2019-11-19 PROCEDURE — 99999 PR PBB SHADOW E&M-EST. PATIENT-LVL IV: CPT | Mod: PBBFAC,,, | Performed by: PHYSICIAN ASSISTANT

## 2019-11-19 PROCEDURE — 99214 OFFICE O/P EST MOD 30 MIN: CPT | Mod: PBBFAC,PN | Performed by: PHYSICIAN ASSISTANT

## 2019-11-19 PROCEDURE — 99999 PR PBB SHADOW E&M-EST. PATIENT-LVL IV: ICD-10-PCS | Mod: PBBFAC,,, | Performed by: PHYSICIAN ASSISTANT

## 2019-11-19 NOTE — LETTER
November 25, 2019      Hermann Chavira MD  [address] [facility] [address] [place] [address]           [facility]  [address]  [facility] [address]  Phone: [phone]  Fax: [phone]          Patient: Raciel White   MR Number: [id number]   YOB: 1970   Date of Visit: 11/19/2019       Dear Dr. Hermann Chavira:    Thank you for referring Raciel White to me for evaluation. Attached you will find relevant portions of my assessment and plan of care.    If you have questions, please do not hesitate to call me. I look forward to following Raciel White along with you.    Sincerely,    Bhavna Wild PA-C    Enclosure  CC:  No Recipients    If you would like to receive this communication electronically, please contact [email or web address] or [phone] to request more information on [email or web address] Link access.    For providers and/or their staff who would like to refer a patient to [facility], please contact us through our one-stop-shop provider referral line, [facility] , at [phone].    If you feel you have received this communication in error or would no longer like to receive these types of communications, please e-mail [email or web address]

## 2019-11-24 NOTE — PROGRESS NOTES
Subjective:      Patient ID: Raciel White is a 49 y.o. male.    Chief Complaint: Bunions (Right foot, PCP-(DUSTIN Taylor)-11/04/2019)    Raciel is a 49 y.o. male who presents to the podiatry clinic  with complaint of  right foot pain. Onset of the symptoms was several years ago. Precipitating event: none known. Current symptoms include: worsening symptoms after a period of activity. Aggravating factors: closed shoe wear. Symptoms have gradually worsened. Patient has had prior foot problems. Evaluation to date: none. Treatment to date: wider shoes helps. Patients rates pain 6/10 on pain scale.        Review of Systems   Constitution: Negative for chills and fever.   Cardiovascular: Negative for claudication and leg swelling.   Respiratory: Negative for shortness of breath.    Skin: Positive for nail changes. Negative for itching and rash.   Musculoskeletal: Negative for muscle cramps, muscle weakness and myalgias.        Right foot bunion   Gastrointestinal: Negative for nausea and vomiting.   Neurological: Negative for focal weakness, loss of balance, numbness and paresthesias.           Objective:      Physical Exam   Constitutional: He is oriented to person, place, and time. He appears well-developed and well-nourished. No distress.   Cardiovascular:   Pulses:       Dorsalis pedis pulses are 2+ on the right side, and 2+ on the left side.        Posterior tibial pulses are 2+ on the right side, and 2+ on the left side.   < 3 sec capillary refill time to toes 1-5 bilateral. Toes and feet are warm to touch proximally with normal distal cooling b/l. There is some hair growth on the feet and toes b/l. There is no edema b/l. No spider veins or varicosities present b/l.      Musculoskeletal:     Medial 1st MTPJ exostosis. Lateral deviation of hallux, non trackbound. No pain w/ ROM to 1st or 2nd MTPJs. No First ray hypermobility or sub second MT head callus. No lesser toe deformities or pain.     Equinus noted  b/l ankles with < 10 deg DF noted. MMT 5/5 in DF/PF/Inv/Ev resistance with no reproduction of pain in any direction. Passive range of motion of ankle and pedal joints is painless b/l.     Neurological: He is alert and oriented to person, place, and time. He has normal strength. He displays no atrophy and no tremor. No sensory deficit. He exhibits normal muscle tone.   Negative tinel sign bilateral.   Skin: Skin is warm, dry and intact. No abrasion, no bruising, no burn, no ecchymosis, no laceration, no lesion, no petechiae and no rash noted. He is not diaphoretic. No cyanosis or erythema. No pallor. Nails show no clubbing.   Skin temperature, texture and turgor within normal limits.   Psychiatric: He has a normal mood and affect. His behavior is normal.             Assessment:       Encounter Diagnosis   Name Primary?    Hav (hallux abducto valgus), right Yes         Plan:       Raciel was seen today for bunions.    Diagnoses and all orders for this visit:    Hav (hallux abducto valgus), right  -     X-Ray Foot Complete Right; Future      I counseled the patient on his conditions, their implications and medical management.    Discussed importance of wearing shoes with adequate room in toe box to accommodate his toe deformities.  Also advised to have a thumb width from end of big toe to end of toe box in his shoes    Patient will obtain over the counter arch supports and wear them in shoes whenever possible.  Athletic shoes intended for walking or running are usually best.    Discussed surgical options with recovery and expectations, declined surgical intervention for now    Return MARY Carver DPM

## 2019-11-25 NOTE — PROGRESS NOTES
Neurosurgery History & Physical    Patient ID: Raciel White is a 49 y.o. male.    Chief Complaint   Patient presents with    Neck Pain     He has had neck pain for years and pain is constant. Pain radiates down both shoulders and into both arms in the last year.    Low-back Pain     He has had low back pain for 24 years and pain is constant. Pain radiates down into both buttocks. His left leg is numb. He has pain in both feet. Advil and a muscle relaxer help pain.       Review of Systems   Constitutional: Negative for activity change, chills, fatigue and unexpected weight change.   HENT: Negative for hearing loss, tinnitus, trouble swallowing and voice change.    Eyes: Negative for visual disturbance.   Respiratory: Negative for apnea, chest tightness and shortness of breath.    Cardiovascular: Negative for chest pain and palpitations.   Gastrointestinal: Negative for abdominal pain, constipation, diarrhea, nausea and vomiting.   Genitourinary: Negative for difficulty urinating, dysuria and frequency.   Musculoskeletal: Positive for back pain, myalgias and neck pain. Negative for gait problem and neck stiffness.   Skin: Negative for wound.   Neurological: Positive for numbness. Negative for dizziness, tremors, seizures, facial asymmetry, speech difficulty, weakness, light-headedness and headaches.   Psychiatric/Behavioral: Negative for confusion and decreased concentration.       Past Medical History:   Diagnosis Date    GERD (gastroesophageal reflux disease)     Insomnia     Sleep apnea     cpap     Social History     Socioeconomic History    Marital status:      Spouse name: Not on file    Number of children: Not on file    Years of education: Not on file    Highest education level: Not on file   Occupational History    Not on file   Social Needs    Financial resource strain: Not on file    Food insecurity:     Worry: Not on file     Inability: Not on file    Transportation needs:     Medical:  Not on file     Non-medical: Not on file   Tobacco Use    Smoking status: Former Smoker     Packs/day: 0.50     Types: Vaping with nicotine    Smokeless tobacco: Former User   Substance and Sexual Activity    Alcohol use: Yes     Alcohol/week: 12.0 standard drinks     Types: 12 Cans of beer per week     Comment: weekly    Drug use: No    Sexual activity: Not on file   Lifestyle    Physical activity:     Days per week: Not on file     Minutes per session: Not on file    Stress: Not on file   Relationships    Social connections:     Talks on phone: Not on file     Gets together: Not on file     Attends Worship service: Not on file     Active member of club or organization: Not on file     Attends meetings of clubs or organizations: Not on file     Relationship status: Not on file   Other Topics Concern    Not on file   Social History Narrative    Currently . 2 children. Work in [employer].      Family History   Problem Relation Age of Onset    Hypertension Paternal Grandfather     Diabetes Neg Hx     Cancer Neg Hx     Glaucoma Neg Hx     Macular degeneration Neg Hx     Retinal detachment Neg Hx      Review of patient's allergies indicates:  No Known Allergies    Current Outpatient Medications:     acetaminophen (TYLENOL) 325 MG tablet, Take 2 tablets (650 mg total) by mouth every 4 (four) hours as needed., Disp: , Rfl: 0    amitriptyline (ELAVIL) 10 MG tablet, TAKE 1 TABLET NIGHTLY AS NEEDED, Disp: 90 tablet, Rfl: 3    fluticasone (FLONASE) 50 mcg/actuation nasal spray, 1 spray by Each Nare route once daily., Disp: 1 Bottle, Rfl: 4    multivitamin capsule, Take 1 capsule by mouth once daily., Disp: , Rfl:     naproxen sodium (ANAPROX) 220 MG tablet, Take 220 mg by mouth every 12 (twelve) hours., Disp: , Rfl:     NEXIUM 20 mg capsule, TAKE 1 CAPSULE DAILY, Disp: 90 capsule, Rfl: 3    tiZANidine (ZANAFLEX) 4 MG tablet, Take 1 tablet by mouth 3 times per day as needed for muscle spasm.,  Disp: 40 tablet, Rfl: 0    triamcinolone acetonide 0.025% (KENALOG) 0.025 % cream, Apply topically 2 (two) times daily. Apply to the affected area sparingly as needed twice a day., Disp: 15 g, Rfl: 1  No current facility-administered medications for this visit.     Facility-Administered Medications Ordered in Other Visits:     lactated ringers infusion, , Intravenous, Continuous, Jaime Valdez MD, Stopped at 11/02/18 0906    Vitals:    11/19/19 1424   BP: 125/80   BP Location: Right arm   Patient Position: Sitting   BP Method: Large (Automatic)   Pulse: 96   Weight: 98.9 kg (218 lb 0.6 oz)   Height: 6' (1.829 m)       Physical Exam   Constitutional: He is oriented to person, place, and time. He appears well-developed and well-nourished.   HENT:   Head: Normocephalic and atraumatic.   Eyes: Pupils are equal, round, and reactive to light.   Neck: Normal range of motion. Neck supple.   Cardiovascular: Normal rate.   Pulmonary/Chest: Effort normal.   Abdominal: He exhibits no distension.   Musculoskeletal: Normal range of motion. He exhibits no edema.   Neurological: He is alert and oriented to person, place, and time. He has a normal Finger-Nose-Finger Test, a normal Heel to Shin Test, a normal Romberg Test and a normal Tandem Gait Test. Gait normal.   Reflex Scores:       Tricep reflexes are 2+ on the right side and 2+ on the left side.       Bicep reflexes are 2+ on the right side and 2+ on the left side.       Brachioradialis reflexes are 2+ on the right side and 2+ on the left side.       Patellar reflexes are 2+ on the right side and 2+ on the left side.       Achilles reflexes are 2+ on the right side and 2+ on the left side.  Skin: Skin is warm and dry.   Psychiatric: He has a normal mood and affect. His speech is normal and behavior is normal. Judgment and thought content normal.   Nursing note and vitals reviewed.      Neurologic Exam     Mental Status   Oriented to person, place, and time.   Oriented  to person.   Oriented to place.   Oriented to time.   Follows 3 step commands.   Attention: normal. Concentration: normal.   Speech: speech is normal   Level of consciousness: alert  Knowledge: consistent with education.   Able to name object. Able to read. Able to repeat. Able to write. Normal comprehension.     Cranial Nerves     CN II   Visual acuity: normal  Right visual field deficit: none  Left visual field deficit: none     CN III, IV, VI   Pupils are equal, round, and reactive to light.  Right pupil: Size: 3 mm. Shape: regular. Reactivity: brisk. Consensual response: intact.   Left pupil: Size: 3 mm. Shape: regular. Reactivity: brisk. Consensual response: intact.   CN III: no CN III palsy  CN VI: no CN VI palsy  Nystagmus: none   Diplopia: none  Ophthalmoparesis: none  Conjugate gaze: present    CN V   Right facial sensation deficit: none  Left facial sensation deficit: none    CN VII   Right facial weakness: none  Left facial weakness: none    CN VIII   Hearing: intact    CN IX, X   CN IX normal.   CN X normal.     CN XI   Right sternocleidomastoid strength: normal  Left sternocleidomastoid strength: normal  Right trapezius strength: normal  Left trapezius strength: normal    CN XII   Fasciculations: absent  Tongue deviation: none    Motor Exam   Muscle bulk: normal  Overall muscle tone: normal  Right arm pronator drift: absent  Left arm pronator drift: absent    Strength   Right neck flexion: 5/5  Left neck flexion: 5/5  Right neck extension: 5/5  Left neck extension: 5/5  Right deltoid: 5/5  Left deltoid: 5/5  Right biceps: 5/5  Left biceps: 5/5  Right triceps: 5/5  Left triceps: 5/5  Right wrist flexion: 5/5  Left wrist flexion: 5/5  Right wrist extension: 5/5  Left wrist extension: 5/5  Right interossei: 5/5  Left interossei: 5/5  Right abdominals: 5/5  Left abdominals: 5/5  Right iliopsoas: 5/5  Left iliopsoas: 5/5  Right quadriceps: 5/5  Left quadriceps: 5/5  Right hamstrin/5  Left hamstring:  5/5  Right glutei: 5/5  Left glutei: 5/5  Right anterior tibial: 5/5  Left anterior tibial: 5/5  Right posterior tibial: 5/5  Left posterior tibial: 5/5  Right peroneal: 5/5  Left peroneal: 5/5  Right gastroc: 5/5  Left gastroc: 5/5    Sensory Exam   Right arm light touch: normal  Left arm light touch: normal  Right leg light touch: normal  Left leg light touch: normal  Right arm vibration: normal  Left arm vibration: normal  Right arm pinprick: normal  Left arm pinprick: normal    Gait, Coordination, and Reflexes     Gait  Gait: normal    Coordination   Romberg: negative  Finger to nose coordination: normal  Heel to shin coordination: normal  Tandem walking coordination: normal    Tremor   Resting tremor: absent  Intention tremor: absent  Action tremor: absent    Reflexes   Right brachioradialis: 2+  Left brachioradialis: 2+  Right biceps: 2+  Left biceps: 2+  Right triceps: 2+  Left triceps: 2+  Right patellar: 2+  Left patellar: 2+  Right achilles: 2+  Left achilles: 2+  Right Guerrero: absent  Left Guerrero: absent  Right ankle clonus: absent  Left ankle clonus: absent      Provider dictation:  49 year old male former smoker with GERD presents for follow up of neck and back pain.  He underwent artifical disk surgery in 2008 in the [facility].  He has continued to have neck pain for years.  For the last year he has also felt pain in the bilateral shoulders radiating into the arms and associated with numbness/ tingling.  He saw orthopedics regarding the shoulders and received an injection without benefit.  He last saw me in April 2019 for neck pain, subsequently undergoing trigger point injections, home stretching without benefit.  He did neck PT in 2013.  Today he also describes 24 year history of lower back pain with radiation into the bilateral buttocks and numbness into the left lateral let.  He has bilateral plantar foot pain, but no radicular pain from the back to the feet.  He has tried home stretches, PT (2013),  and VONNIE in the lower back in the past without resolution.  He takes advil and a muscle relaxant for neck and back pain.  Denies weakness and bowel/ bladder incontinence.  NDI:  42%.  Oswestry score: 50%.  PHQ:  0.    On exam, he is neurologically intact with 2+ DTR,  5/5 strength and no sensory deficits throughout the upper and lower extremities.      Have reviewed CT cervical spine from 04/20/2019 revealing mild multilevel degenerative changes.  At C3-4 there is mild right foraminal narrowing.  At C5-6 there is evidence of postoperative fusion with mild to moderate right foraminal narrowing and mild left foraminal narrowing.    In conclusion, Mr. White has had C5/6 cervical spine surgery with new onset cervicalgia and radiculopathy.  He also has lower back pain with left leg subjective numbness that has not responded to conservative measures in the past.  I am recommending we obtain x-rays and MRI both the cervical and lumbar spine to assess the current condition of his spine and for any neural compression.  Follow-up in clinic after imaging is complete.    Visit Diagnosis:  Cervicalgia  -     X-Ray Cervical Spine 5 View W Flex Extxt; Future; Expected date: 11/19/2019  -     MRI Cervical Spine Without Contrast; Future; Expected date: 11/19/2019    H/O cervical spine surgery  -     X-Ray Cervical Spine 5 View W Flex Extxt; Future; Expected date: 11/19/2019  -     MRI Cervical Spine Without Contrast; Future; Expected date: 11/19/2019    Cervical radiculopathy  -     X-Ray Cervical Spine 5 View W Flex Extxt; Future; Expected date: 11/19/2019  -     MRI Cervical Spine Without Contrast; Future; Expected date: 11/19/2019    Chronic bilateral low back pain with left-sided sciatica  -     X-Ray Lumbar Complete With Flex And Ext; Future; Expected date: 11/19/2019  -     MRI Lumbar Spine Without Contrast; Future; Expected date: 11/19/2019        Total time spent counseling greater than fifty percent of total visit  time.  Counseling included discussion regarding imaging findings, diagnosis possibilities, treatment options, risks and benefits.   The patient had many questions regarding the options and long-term effects.

## 2019-11-26 ENCOUNTER — HOSPITAL ENCOUNTER (OUTPATIENT)
Dept: RADIOLOGY | Facility: HOSPITAL | Age: 49
Discharge: HOME | End: 2019-11-26
Attending: PHYSICIAN ASSISTANT
Payer: OTHER GOVERNMENT

## 2019-11-26 DIAGNOSIS — M54.42 CHRONIC BILATERAL LOW BACK PAIN WITH LEFT-SIDED SCIATICA: ICD-10-CM

## 2019-11-26 DIAGNOSIS — M54.12 CERVICAL RADICULOPATHY: ICD-10-CM

## 2019-11-26 DIAGNOSIS — G89.29 CHRONIC BILATERAL LOW BACK PAIN WITH LEFT-SIDED SCIATICA: ICD-10-CM

## 2019-11-26 DIAGNOSIS — M54.2 CERVICALGIA: ICD-10-CM

## 2019-11-26 DIAGNOSIS — Z98.890 H/O CERVICAL SPINE SURGERY: ICD-10-CM

## 2019-11-26 PROCEDURE — 72052 XR CERVICAL SPINE 5 VIEW WITH FLEX AND EXT: ICD-10-PCS | Mod: 26,,, | Performed by: RADIOLOGY

## 2019-11-26 PROCEDURE — 72148 MRI LUMBAR SPINE W/O DYE: CPT | Mod: TC

## 2019-11-26 PROCEDURE — 72110 XR LUMBAR SPINE 5 VIEW WITH FLEX AND EXT: ICD-10-PCS | Mod: 26,,, | Performed by: RADIOLOGY

## 2019-11-26 PROCEDURE — 72141 MRI NECK SPINE W/O DYE: CPT | Mod: TC

## 2019-11-26 PROCEDURE — 72148 MRI LUMBAR SPINE W/O DYE: CPT | Mod: 26,,, | Performed by: RADIOLOGY

## 2019-11-26 PROCEDURE — 72148 MRI LUMBAR SPINE WITHOUT CONTRAST: ICD-10-PCS | Mod: 26,,, | Performed by: RADIOLOGY

## 2019-11-26 PROCEDURE — 72052 X-RAY EXAM NECK SPINE 6/>VWS: CPT | Mod: 26,,, | Performed by: RADIOLOGY

## 2019-11-26 PROCEDURE — 72141 MRI CERVICAL SPINE WITHOUT CONTRAST: ICD-10-PCS | Mod: 26,,, | Performed by: RADIOLOGY

## 2019-11-26 PROCEDURE — 72141 MRI NECK SPINE W/O DYE: CPT | Mod: 26,,, | Performed by: RADIOLOGY

## 2019-11-26 PROCEDURE — 72110 X-RAY EXAM L-2 SPINE 4/>VWS: CPT | Mod: TC

## 2019-11-26 PROCEDURE — 72110 X-RAY EXAM L-2 SPINE 4/>VWS: CPT | Mod: 26,,, | Performed by: RADIOLOGY

## 2019-11-26 PROCEDURE — 72052 X-RAY EXAM NECK SPINE 6/>VWS: CPT | Mod: TC

## 2019-12-02 ENCOUNTER — OFFICE VISIT (OUTPATIENT)
Dept: SPINE | Facility: CLINIC | Age: 49
End: 2019-12-02
Payer: OTHER GOVERNMENT

## 2019-12-02 VITALS
SYSTOLIC BLOOD PRESSURE: 121 MMHG | BODY MASS INDEX: 29.54 KG/M2 | HEIGHT: 72 IN | WEIGHT: 218.06 LBS | DIASTOLIC BLOOD PRESSURE: 83 MMHG | HEART RATE: 94 BPM

## 2019-12-02 DIAGNOSIS — Z98.890 H/O CERVICAL SPINE SURGERY: ICD-10-CM

## 2019-12-02 DIAGNOSIS — M47.812 CERVICAL SPONDYLOSIS: ICD-10-CM

## 2019-12-02 DIAGNOSIS — M54.42 CHRONIC BILATERAL LOW BACK PAIN WITH LEFT-SIDED SCIATICA: ICD-10-CM

## 2019-12-02 DIAGNOSIS — M54.2 CERVICALGIA: Primary | ICD-10-CM

## 2019-12-02 DIAGNOSIS — G89.29 CHRONIC BILATERAL LOW BACK PAIN WITH LEFT-SIDED SCIATICA: ICD-10-CM

## 2019-12-02 DIAGNOSIS — M47.816 LUMBAR SPONDYLOSIS: ICD-10-CM

## 2019-12-02 DIAGNOSIS — M54.12 CERVICAL RADICULOPATHY: ICD-10-CM

## 2019-12-02 PROCEDURE — 99999 PR PBB SHADOW E&M-EST. PATIENT-LVL IV: CPT | Mod: PBBFAC,,, | Performed by: PHYSICIAN ASSISTANT

## 2019-12-02 PROCEDURE — 99214 OFFICE O/P EST MOD 30 MIN: CPT | Mod: S$PBB,,, | Performed by: PHYSICIAN ASSISTANT

## 2019-12-02 PROCEDURE — 99999 PR PBB SHADOW E&M-EST. PATIENT-LVL IV: ICD-10-PCS | Mod: PBBFAC,,, | Performed by: PHYSICIAN ASSISTANT

## 2019-12-02 PROCEDURE — 99214 PR OFFICE/OUTPT VISIT, EST, LEVL IV, 30-39 MIN: ICD-10-PCS | Mod: S$PBB,,, | Performed by: PHYSICIAN ASSISTANT

## 2019-12-02 PROCEDURE — 99214 OFFICE O/P EST MOD 30 MIN: CPT | Mod: PBBFAC,PN | Performed by: PHYSICIAN ASSISTANT

## 2019-12-05 NOTE — PROGRESS NOTES
Neurosurgery History & Physical    Patient ID: Raciel White is a 49 y.o. male.    Chief Complaint   Patient presents with    Follow-up     MRI and X-ray results       Review of Systems   Constitutional: Negative for activity change, chills, fatigue and unexpected weight change.   HENT: Negative for hearing loss, tinnitus, trouble swallowing and voice change.    Eyes: Negative for visual disturbance.   Respiratory: Negative for apnea, chest tightness and shortness of breath.    Cardiovascular: Negative for chest pain and palpitations.   Gastrointestinal: Negative for abdominal pain, constipation, diarrhea, nausea and vomiting.   Genitourinary: Negative for difficulty urinating, dysuria and frequency.   Musculoskeletal: Positive for back pain, myalgias and neck pain. Negative for gait problem and neck stiffness.   Skin: Negative for wound.   Neurological: Positive for numbness. Negative for dizziness, tremors, seizures, facial asymmetry, speech difficulty, weakness, light-headedness and headaches.   Psychiatric/Behavioral: Negative for confusion and decreased concentration.       Past Medical History:   Diagnosis Date    GERD (gastroesophageal reflux disease)     Insomnia     Sleep apnea     cpap     Social History     Socioeconomic History    Marital status:      Spouse name: Not on file    Number of children: Not on file    Years of education: Not on file    Highest education level: Not on file   Occupational History    Not on file   Social Needs    Financial resource strain: Not on file    Food insecurity:     Worry: Not on file     Inability: Not on file    Transportation needs:     Medical: Not on file     Non-medical: Not on file   Tobacco Use    Smoking status: Former Smoker     Packs/day: 0.50     Types: Vaping with nicotine    Smokeless tobacco: Former User   Substance and Sexual Activity    Alcohol use: Yes     Alcohol/week: 12.0 standard drinks     Types: 12 Cans of beer per week      Comment: weekly    Drug use: No    Sexual activity: Not on file   Lifestyle    Physical activity:     Days per week: Not on file     Minutes per session: Not on file    Stress: Not on file   Relationships    Social connections:     Talks on phone: Not on file     Gets together: Not on file     Attends Voodoo service: Not on file     Active member of club or organization: Not on file     Attends meetings of clubs or organizations: Not on file     Relationship status: Not on file   Other Topics Concern    Not on file   Social History Narrative    Currently . 2 children. Work in [employer].      Family History   Problem Relation Age of Onset    Hypertension Paternal Grandfather     Diabetes Neg Hx     Cancer Neg Hx     Glaucoma Neg Hx     Macular degeneration Neg Hx     Retinal detachment Neg Hx      Review of patient's allergies indicates:  No Known Allergies    Current Outpatient Medications:     acetaminophen (TYLENOL) 325 MG tablet, Take 2 tablets (650 mg total) by mouth every 4 (four) hours as needed., Disp: , Rfl: 0    amitriptyline (ELAVIL) 10 MG tablet, TAKE 1 TABLET NIGHTLY AS NEEDED, Disp: 90 tablet, Rfl: 3    fluticasone (FLONASE) 50 mcg/actuation nasal spray, 1 spray by Each Nare route once daily., Disp: 1 Bottle, Rfl: 4    multivitamin capsule, Take 1 capsule by mouth once daily., Disp: , Rfl:     naproxen sodium (ANAPROX) 220 MG tablet, Take 220 mg by mouth every 12 (twelve) hours., Disp: , Rfl:     NEXIUM 20 mg capsule, TAKE 1 CAPSULE DAILY, Disp: 90 capsule, Rfl: 3    tiZANidine (ZANAFLEX) 4 MG tablet, Take 1 tablet by mouth 3 times per day as needed for muscle spasm., Disp: 40 tablet, Rfl: 0    triamcinolone acetonide 0.025% (KENALOG) 0.025 % cream, Apply topically 2 (two) times daily. Apply to the affected area sparingly as needed twice a day., Disp: 15 g, Rfl: 1  No current facility-administered medications for this visit.     Facility-Administered Medications Ordered in  Other Visits:     lactated ringers infusion, , Intravenous, Continuous, Jaime Valdez MD, Stopped at 11/02/18 0906    Vitals:    12/02/19 1530   BP: 121/83   BP Location: Left arm   Patient Position: Sitting   BP Method: Medium (Automatic)   Pulse: 94   Weight: 98.9 kg (218 lb 0.6 oz)   Height: 6' (1.829 m)       Physical Exam   Constitutional: He is oriented to person, place, and time. He appears well-developed and well-nourished.   HENT:   Head: Normocephalic and atraumatic.   Eyes: Pupils are equal, round, and reactive to light.   Neck: Normal range of motion. Neck supple.   Cardiovascular: Normal rate.   Pulmonary/Chest: Effort normal.   Abdominal: He exhibits no distension.   Musculoskeletal: Normal range of motion. He exhibits no edema.   Neurological: He is alert and oriented to person, place, and time. He has a normal Finger-Nose-Finger Test, a normal Heel to Shin Test, a normal Romberg Test and a normal Tandem Gait Test. Gait normal.   Reflex Scores:       Tricep reflexes are 2+ on the right side and 2+ on the left side.       Bicep reflexes are 2+ on the right side and 2+ on the left side.       Brachioradialis reflexes are 2+ on the right side and 2+ on the left side.       Patellar reflexes are 2+ on the right side and 2+ on the left side.       Achilles reflexes are 2+ on the right side and 2+ on the left side.  Skin: Skin is warm and dry.   Psychiatric: He has a normal mood and affect. His speech is normal and behavior is normal. Judgment and thought content normal.   Nursing note and vitals reviewed.      Neurologic Exam     Mental Status   Oriented to person, place, and time.   Oriented to person.   Oriented to place.   Oriented to time.   Follows 3 step commands.   Attention: normal. Concentration: normal.   Speech: speech is normal   Level of consciousness: alert  Knowledge: consistent with education.   Able to name object. Able to read. Able to repeat. Able to write. Normal comprehension.      Cranial Nerves     CN II   Visual acuity: normal  Right visual field deficit: none  Left visual field deficit: none     CN III, IV, VI   Pupils are equal, round, and reactive to light.  Right pupil: Size: 3 mm. Shape: regular. Reactivity: brisk. Consensual response: intact.   Left pupil: Size: 3 mm. Shape: regular. Reactivity: brisk. Consensual response: intact.   CN III: no CN III palsy  CN VI: no CN VI palsy  Nystagmus: none   Diplopia: none  Ophthalmoparesis: none  Conjugate gaze: present    CN V   Right facial sensation deficit: none  Left facial sensation deficit: none    CN VII   Right facial weakness: none  Left facial weakness: none    CN VIII   Hearing: intact    CN IX, X   CN IX normal.   CN X normal.     CN XI   Right sternocleidomastoid strength: normal  Left sternocleidomastoid strength: normal  Right trapezius strength: normal  Left trapezius strength: normal    CN XII   Fasciculations: absent  Tongue deviation: none    Motor Exam   Muscle bulk: normal  Overall muscle tone: normal  Right arm pronator drift: absent  Left arm pronator drift: absent    Strength   Right neck flexion: 5/5  Left neck flexion: 5/5  Right neck extension: 5/5  Left neck extension: 5/5  Right deltoid: 5/5  Left deltoid: 5/5  Right biceps: 5/5  Left biceps: 5/5  Right triceps: 5/5  Left triceps: 5/5  Right wrist flexion: 5/5  Left wrist flexion: 5/5  Right wrist extension: 5/5  Left wrist extension: 5/5  Right interossei: 5/5  Left interossei: 5/5  Right abdominals: 5/5  Left abdominals: 5/5  Right iliopsoas: 5/5  Left iliopsoas: 5/5  Right quadriceps: 5/5  Left quadriceps: 5/5  Right hamstrin/5  Left hamstrin/5  Right glutei: 5/5  Left glutei: 5/5  Right anterior tibial: 5/5  Left anterior tibial: 5/5  Right posterior tibial: 5/5  Left posterior tibial: 5/5  Right peroneal: 5/5  Left peroneal: 5/5  Right gastroc: 5/5  Left gastroc: 5/5    Sensory Exam   Right arm light touch: normal  Left arm light touch:  normal  Right leg light touch: normal  Left leg light touch: normal  Right arm vibration: normal  Left arm vibration: normal  Right arm pinprick: normal  Left arm pinprick: normal    Gait, Coordination, and Reflexes     Gait  Gait: normal    Coordination   Romberg: negative  Finger to nose coordination: normal  Heel to shin coordination: normal  Tandem walking coordination: normal    Tremor   Resting tremor: absent  Intention tremor: absent  Action tremor: absent    Reflexes   Right brachioradialis: 2+  Left brachioradialis: 2+  Right biceps: 2+  Left biceps: 2+  Right triceps: 2+  Left triceps: 2+  Right patellar: 2+  Left patellar: 2+  Right achilles: 2+  Left achilles: 2+  Right Guerrero: absent  Left Guerrero: absent  Right ankle clonus: absent  Left ankle clonus: absent      Provider dictation:  49 year old male former smoker with GERD presents for follow up of neck and back pain after undergoing updated imaging.  There have been no significant changes since his last visit.  He underwent artifical disk surgery in 2008 in the [place].  He has continued to have neck pain for years.  For the last year he has also felt pain in the bilateral shoulders radiating into the arms and associated with numbness/ tingling.  He saw orthopedics regarding the shoulders and received an injection without benefit.  He last saw me in April 2019 for neck pain, subsequently undergoing trigger point injections, home stretching without benefit.  He did neck PT in 2013.  He also describes 24 year history of lower back pain with radiation into the bilateral buttocks and numbness into the left lateral let.  He has bilateral plantar foot pain, but no radicular pain from the back to the feet.  He has tried home stretches, PT (2013), and VONNIE in the lower back in the past without resolution.  He takes advil and a muscle relaxant for neck and back pain.  Denies weakness and bowel/ bladder incontinence.  NDI:  42%.  Oswestry score: 50%.  PHQ:   0.    On exam, he is neurologically intact with 2+ DTR,  5/5 strength and no sensory deficits throughout the upper and lower extremities.      CT cervical spine from 04/20/2019 personally reviewed revealing mild multilevel degenerative changes.  At C3-4 there is mild right foraminal narrowing.  At C5-6 there is evidence of postoperative fusion with mild to moderate right foraminal narrowing and mild left foraminal narrowing.    X-rays an MRI cervical spine from 11/26/2019 personally reviewed demonstrating postoperative changes at C5-6 with marked artifact from C4-C7.  No significant foraminal narrowing at C2-3, C3-4 are at C7-T1.  No significant central canal stenosis noted at these areas either.  No instability noted on x-rays.    X-rays and MRI lumbar spine from 11/26/2019 personally reviewed revealing remote L1 compression fracture as was seen on a 2013 study.  At L4-5 and L5-S1 there is broad-based disc and facet arthropathy with mild bilateral foraminal narrowing.    In conclusion, Mr. White has had C5/6 artificial disc placement with new onset cervicalgia and bilateral radiculopathy that could be related to mild degenerative changes with foraminal narrowing at C5/6 and C6/7 noted on CT scan.  Surgical intervention is not urgent and all conservative options (PT and VONNIE) should be maximized prior to considering further surgery.  Even if conservative measures do not help, with mild findings on imaging, we would obtain EMG/ NCV testing prior to surgical opinion.  Regarding his lower back therea re mild findings there as well on imaging, but possible it could be contributing to the pain and numbness.  Same recommendation for the lumbar spine - maximize Pt and VONNIE and if no improvement then nerve testing prior to surgical opinion.  He would like referral to pain management for injections.  Follow up with me as needed.      Visit Diagnosis:  Cervicalgia  -     Ambulatory referral to Pain Clinic    H/O cervical spine  surgery  -     Ambulatory referral to Pain Clinic    Cervical radiculopathy  -     Ambulatory referral to Pain Clinic    Chronic bilateral low back pain with left-sided sciatica  -     Ambulatory referral to Pain Clinic    Cervical spondylosis  -     Ambulatory referral to Pain Clinic    Lumbar spondylosis  -     Ambulatory referral to Pain Clinic        Total time spent counseling greater than fifty percent of total visit time.  Counseling included discussion regarding imaging findings, diagnosis possibilities, treatment options, risks and benefits.   The patient had many questions regarding the options and long-term effects.

## 2019-12-06 ENCOUNTER — HOSPITAL ENCOUNTER (OUTPATIENT)
Dept: RADIOLOGY | Facility: HOSPITAL | Age: 49
Discharge: HOME | End: 2019-12-06
Attending: ANESTHESIOLOGY
Payer: OTHER GOVERNMENT

## 2019-12-06 ENCOUNTER — OFFICE VISIT (OUTPATIENT)
Dept: PAIN MEDICINE | Facility: CLINIC | Age: 49
End: 2019-12-06
Payer: OTHER GOVERNMENT

## 2019-12-06 VITALS
DIASTOLIC BLOOD PRESSURE: 84 MMHG | RESPIRATION RATE: 20 BRPM | SYSTOLIC BLOOD PRESSURE: 119 MMHG | WEIGHT: 212.75 LBS | HEART RATE: 91 BPM | TEMPERATURE: 98 F | BODY MASS INDEX: 28.85 KG/M2

## 2019-12-06 DIAGNOSIS — M54.16 LUMBAR RADICULOPATHY: ICD-10-CM

## 2019-12-06 DIAGNOSIS — M54.12 CERVICAL RADICULOPATHY: ICD-10-CM

## 2019-12-06 DIAGNOSIS — M51.36 DDD (DEGENERATIVE DISC DISEASE), LUMBAR: ICD-10-CM

## 2019-12-06 DIAGNOSIS — M50.30 DDD (DEGENERATIVE DISC DISEASE), CERVICAL: Primary | ICD-10-CM

## 2019-12-06 DIAGNOSIS — M47.816 LUMBAR SPONDYLOSIS: ICD-10-CM

## 2019-12-06 DIAGNOSIS — M54.16 BILATERAL LUMBAR RADICULOPATHY: ICD-10-CM

## 2019-12-06 DIAGNOSIS — M50.30 DDD (DEGENERATIVE DISC DISEASE), CERVICAL: ICD-10-CM

## 2019-12-06 PROCEDURE — 99214 OFFICE O/P EST MOD 30 MIN: CPT | Mod: PBBFAC,25,PN | Performed by: ANESTHESIOLOGY

## 2019-12-06 PROCEDURE — 72110 X-RAY EXAM L-2 SPINE 4/>VWS: CPT | Mod: 26,,, | Performed by: RADIOLOGY

## 2019-12-06 PROCEDURE — 72110 XR LUMBAR SPINE COMPLETE 5 VIEW: ICD-10-PCS | Mod: 26,,, | Performed by: RADIOLOGY

## 2019-12-06 PROCEDURE — 99214 OFFICE O/P EST MOD 30 MIN: CPT | Mod: S$PBB,,, | Performed by: ANESTHESIOLOGY

## 2019-12-06 PROCEDURE — 99999 PR PBB SHADOW E&M-EST. PATIENT-LVL IV: CPT | Mod: PBBFAC,,, | Performed by: ANESTHESIOLOGY

## 2019-12-06 PROCEDURE — 99214 PR OFFICE/OUTPT VISIT, EST, LEVL IV, 30-39 MIN: ICD-10-PCS | Mod: S$PBB,,, | Performed by: ANESTHESIOLOGY

## 2019-12-06 PROCEDURE — 72050 XR CERVICAL SPINE COMPLETE 5 VIEW: ICD-10-PCS | Mod: 26,,, | Performed by: RADIOLOGY

## 2019-12-06 PROCEDURE — 72110 X-RAY EXAM L-2 SPINE 4/>VWS: CPT | Mod: TC,PO

## 2019-12-06 PROCEDURE — 72050 X-RAY EXAM NECK SPINE 4/5VWS: CPT | Mod: TC,PO

## 2019-12-06 PROCEDURE — 72050 X-RAY EXAM NECK SPINE 4/5VWS: CPT | Mod: 26,,, | Performed by: RADIOLOGY

## 2019-12-06 PROCEDURE — 96372 THER/PROPH/DIAG INJ SC/IM: CPT | Mod: PBBFAC,PN

## 2019-12-06 PROCEDURE — 99999 PR PBB SHADOW E&M-EST. PATIENT-LVL IV: ICD-10-PCS | Mod: PBBFAC,,, | Performed by: ANESTHESIOLOGY

## 2019-12-06 RX ORDER — KETOROLAC TROMETHAMINE 30 MG/ML
60 INJECTION, SOLUTION INTRAMUSCULAR; INTRAVENOUS
Status: COMPLETED | OUTPATIENT
Start: 2019-12-06 | End: 2019-12-06

## 2019-12-06 RX ORDER — ALPRAZOLAM 0.5 MG/1
1 TABLET, ORALLY DISINTEGRATING ORAL ONCE AS NEEDED
Status: CANCELLED | OUTPATIENT
Start: 2019-12-13 | End: 2031-05-10

## 2019-12-06 RX ADMIN — KETOROLAC TROMETHAMINE 60 MG: 60 INJECTION, SOLUTION INTRAMUSCULAR at 11:12

## 2019-12-06 NOTE — LETTER
December 7, 2019      Bhavna Wild PA-C  [address] [facility] [address]  [address]  [facility] [address]           [facility]  [address] [place] [address]  Phone: [phone]  Fax: [phone]          Patient: Raciel White   MR Number: [id number]   YOB: 1970   Date of Visit: 12/6/2019       Dear Bhavna Wild:    Thank you for referring Raciel White to me for evaluation. Attached you will find relevant portions of my assessment and plan of care.    If you have questions, please do not hesitate to call me. I look forward to following Raciel White along with you.    Sincerely,    Juan Carlos Romeo MD    Enclosure  CC:  No Recipients    If you would like to receive this communication electronically, please contact [email or web address] or [phone] to request more information on [employer] Link access.    For providers and/or their staff who would like to refer a patient to [facility], please contact us through our one-stop-shop provider referral line, [facility], at [phone].    If you feel you have received this communication in error or would no longer like to receive these types of communications, please e-mail [email or web address]
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
## 2019-12-12 DIAGNOSIS — M51.36 DDD (DEGENERATIVE DISC DISEASE), LUMBAR: Primary | ICD-10-CM

## 2019-12-13 ENCOUNTER — HOSPITAL ENCOUNTER (OUTPATIENT)
Facility: HOSPITAL | Age: 49
Discharge: HOME | End: 2019-12-13
Attending: ANESTHESIOLOGY | Admitting: ANESTHESIOLOGY
Payer: OTHER GOVERNMENT

## 2019-12-13 ENCOUNTER — HOSPITAL ENCOUNTER (OUTPATIENT)
Dept: RADIOLOGY | Facility: HOSPITAL | Age: 49
Discharge: HOME | End: 2019-12-13
Attending: ANESTHESIOLOGY | Admitting: ANESTHESIOLOGY
Payer: OTHER GOVERNMENT

## 2019-12-13 DIAGNOSIS — M54.16 BILATERAL LUMBAR RADICULOPATHY: ICD-10-CM

## 2019-12-13 DIAGNOSIS — M54.16 LUMBAR RADICULOPATHY: Primary | ICD-10-CM

## 2019-12-13 DIAGNOSIS — M51.36 DDD (DEGENERATIVE DISC DISEASE), LUMBAR: ICD-10-CM

## 2019-12-13 PROCEDURE — 63600175 PHARM REV CODE 636 W HCPCS: Mod: PO | Performed by: ANESTHESIOLOGY

## 2019-12-13 PROCEDURE — 25000003 PHARM REV CODE 250: Mod: PO | Performed by: ANESTHESIOLOGY

## 2019-12-13 PROCEDURE — 25500020 PHARM REV CODE 255: Mod: PO | Performed by: ANESTHESIOLOGY

## 2019-12-13 PROCEDURE — 76000 FLUOROSCOPY <1 HR PHYS/QHP: CPT | Mod: TC,PO

## 2019-12-13 PROCEDURE — 62323 NJX INTERLAMINAR LMBR/SAC: CPT | Mod: PO | Performed by: ANESTHESIOLOGY

## 2019-12-13 PROCEDURE — 62323 NJX INTERLAMINAR LMBR/SAC: CPT | Mod: ,,, | Performed by: ANESTHESIOLOGY

## 2019-12-13 PROCEDURE — 62323 PR INJ LUMBAR/SACRAL, W/IMAGING GUIDANCE: ICD-10-PCS | Mod: ,,, | Performed by: ANESTHESIOLOGY

## 2019-12-13 RX ORDER — ALPRAZOLAM 0.5 MG/1
1 TABLET, ORALLY DISINTEGRATING ORAL ONCE AS NEEDED
Status: COMPLETED | OUTPATIENT
Start: 2019-12-13 | End: 2019-12-13

## 2019-12-13 RX ORDER — METHYLPREDNISOLONE ACETATE 80 MG/ML
INJECTION, SUSPENSION INTRA-ARTICULAR; INTRALESIONAL; INTRAMUSCULAR; SOFT TISSUE
Status: DISCONTINUED | OUTPATIENT
Start: 2019-12-13 | End: 2019-12-13 | Stop reason: HOSPADM

## 2019-12-13 RX ORDER — LORATADINE 10 MG/1
10 TABLET ORAL DAILY
COMMUNITY

## 2019-12-13 RX ORDER — LIDOCAINE HYDROCHLORIDE 10 MG/ML
INJECTION, SOLUTION EPIDURAL; INFILTRATION; INTRACAUDAL; PERINEURAL
Status: DISCONTINUED | OUTPATIENT
Start: 2019-12-13 | End: 2019-12-13 | Stop reason: HOSPADM

## 2019-12-13 RX ADMIN — ALPRAZOLAM 0.5 MG: 0.5 TABLET, ORALLY DISINTEGRATING ORAL at 01:12

## 2019-12-13 NOTE — PLAN OF CARE
Patient refuses PO fluids at this time. No distress/discomfort noted. Vital signs stable. Discharge instructions reviewed with patient/family. Verbalized understanding. Patient ready for discharge.

## 2019-12-13 NOTE — OP NOTE

## 2019-12-13 NOTE — DISCHARGE INSTRUCTIONS
PAIN MANAGEMENT    Home care instructions   Apply ice pack to the injection site for 20 minute prior for the first 24 hours for soreness/discomfort at injection site   DO NOT USE HEAT FOR 24 HOURS   Keep site clean and dry for 24 hours, remove bandaid when desired   Do not drive until tomorrow  Take care when walking after a lumbar injection     STEROIDS OR RADIOFREQUENCY    May take 10-14 days for full effects  Avoid strenuous exercises for 2 days  Resume Aspirin, Plavix, or Coumadin the day after the procedure unless other wise instructed  Resume home medication as prescribed today    CALL PHYSICIAN FOR:   Severe increase in your usual pain or appearance of new pain   Prolonged or increasing weakness or numbness in the legs or arms   Fever greater then 100 degrees F..   Drainage from the incision site, redness, active bleeding or increased swelling at the injection site   Headache that increases when your head is upright and decreases when you lie flat    FOR EMERGENCIES:   Go directly to Emergency Department for Shortness of breath, chest pain, or problems breathing      Recovery After Procedural Sedation (Adult)  You have been given medicine by vein to make you sleep during your surgery. This may have included both a pain medicine and sleeping medicine. Most of the effects have worn off. But you may still have some drowsiness for the next 6 to 8 hours.  Home care  Follow these guidelines when you get home:  · For the next 8 hours, you should be watched by a responsible adult. This person should make sure your condition is not getting worse.  · Don't drink any alcohol for the next 24 hours.  · Don't drive, operate dangerous machinery, or make important business or personal decisions during the next 24 hours.  Note: Your healthcare provider may tell you not to take any medicine by mouth for pain or sleep in the next 4 hours. These medicines may react with the medicines you were given in the hospital. This  could cause a much stronger response than usual.  Follow-up care  Follow up with your healthcare provider if you are not alert and back to your usual level of activity within 12 hours.  When to seek medical advice  Call your healthcare provider right away if any of these occur:  · Drowsiness gets worse  · Weakness or dizziness gets worse  · Repeated vomiting  · You can't be awakened   Date Last Reviewed: 10/18/2016  © 9044-8389 [employer]. [address] [place] [address], [place] [address]. All rights reserved. This information is not intended as a substitute for professional medical care. Always follow your healthcare professional's instructions.

## 2019-12-16 VITALS
WEIGHT: 210 LBS | OXYGEN SATURATION: 97 % | HEART RATE: 70 BPM | DIASTOLIC BLOOD PRESSURE: 83 MMHG | HEIGHT: 72 IN | TEMPERATURE: 97 F | SYSTOLIC BLOOD PRESSURE: 122 MMHG | BODY MASS INDEX: 28.44 KG/M2 | RESPIRATION RATE: 16 BRPM

## 2020-01-03 ENCOUNTER — OFFICE VISIT (OUTPATIENT)
Dept: PAIN MEDICINE | Facility: CLINIC | Age: 50
End: 2020-01-03
Payer: OTHER GOVERNMENT

## 2020-01-03 VITALS
BODY MASS INDEX: 29.68 KG/M2 | HEIGHT: 72 IN | SYSTOLIC BLOOD PRESSURE: 133 MMHG | RESPIRATION RATE: 13 BRPM | HEART RATE: 85 BPM | DIASTOLIC BLOOD PRESSURE: 85 MMHG | WEIGHT: 219.13 LBS

## 2020-01-03 DIAGNOSIS — M51.36 DDD (DEGENERATIVE DISC DISEASE), LUMBAR: ICD-10-CM

## 2020-01-03 DIAGNOSIS — M47.816 LUMBAR SPONDYLOSIS: ICD-10-CM

## 2020-01-03 DIAGNOSIS — M54.16 LUMBAR RADICULOPATHY: Primary | ICD-10-CM

## 2020-01-03 PROCEDURE — 99214 OFFICE O/P EST MOD 30 MIN: CPT | Mod: S$PBB,,, | Performed by: ANESTHESIOLOGY

## 2020-01-03 PROCEDURE — 99214 PR OFFICE/OUTPT VISIT, EST, LEVL IV, 30-39 MIN: ICD-10-PCS | Mod: S$PBB,,, | Performed by: ANESTHESIOLOGY

## 2020-01-03 PROCEDURE — 99999 PR PBB SHADOW E&M-EST. PATIENT-LVL IV: ICD-10-PCS | Mod: PBBFAC,,, | Performed by: ANESTHESIOLOGY

## 2020-01-03 PROCEDURE — 99999 PR PBB SHADOW E&M-EST. PATIENT-LVL IV: CPT | Mod: PBBFAC,,, | Performed by: ANESTHESIOLOGY

## 2020-01-03 PROCEDURE — 99214 OFFICE O/P EST MOD 30 MIN: CPT | Mod: PBBFAC,PN | Performed by: ANESTHESIOLOGY

## 2020-01-03 RX ORDER — ALPRAZOLAM 0.5 MG/1
1 TABLET, ORALLY DISINTEGRATING ORAL ONCE AS NEEDED
Status: CANCELLED | OUTPATIENT
Start: 2020-01-03 | End: 2031-06-01

## 2020-01-03 NOTE — H&P (VIEW-ONLY)
This note was completed with dictation software and grammatical errors may exist.    CC:  Bilateral upper back and neck pain, low back pain and left leg pain    HPI:  The patient is a 49-year-old male with past medical history significant for C5/6 cervical spine surgery in 2008 who presents in referral from Bhavna Wild PA-C for left upper back pain. He returns in follow-up today, he is status post L5/S1 VONNIE on 12/13/2019 with 70% relief of his left leg pain, 50% relief of his back pain.  He continues to have pain worse with standing, worse with certain movements, does continue to shoot into his leg but is not as intense.  Unfortunately he is still having significant impairment with bending forward or doing any twisting.  He is having difficulty becoming comfortable, has problems with sitting for too long that he actually has to stand frequently.  He denies any reji weakness, no bowel or bladder incontinence.        Previous history:  He reports having a very mild baseline degree of pain that has been present since his neck surgery in 2008 in the  but this was very tolerable until December 2018 when he developed severe pain in the left scapular region.  He does not have significant neck pain today but reports constant pain in the left scapular region.  This is significantly worse with looking to the left and looking up.  He describes it is grabbing, tight, also worse 1st things in the morning and improved with lying down.  He has been taking Flexeril without significant relief and reports drowsiness with this.  He has some numbness and weakness in the left arm and hand in an ulnar distribution.  He denies bladder or bowel incontinence.    Pain intervention history:  C5-6 artificial disc/flexible disc spacer in 2008.  He has taken Flexeril without significant relief and this causes drowsiness.  He has done physical therapy in the past over the years and has continued doing these exercises for both his  neck and his back.  In about 2010, he had undergone a lumbar injection which sounds like an epidural steroid injection and reports having almost 100% relief with this for one year.    ROS:  The patient reports left upper back pain.  Balance of review of systems is negative.    Past Medical History:   Diagnosis Date    GERD (gastroesophageal reflux disease)     Insomnia     Sleep apnea     cpap       Past Surgical History:   Procedure Laterality Date    COLONOSCOPY  2011    EPIDURAL STEROID INJECTION INTO LUMBAR SPINE N/A 12/13/2019    Procedure: Injection-steroid-epidural-lumbar;  Surgeon: Juan Carlos Romeo MD;  Location: [facility] OR;  Service: Pain Management;  Laterality: N/A;  L5/S1 L>R    LARYNGOSCOPY Bilateral 11/2/2018    Procedure: Sleep endoscopy;  Surgeon: Brandin Rosen MD;  Location: [facility] OR;  Service: ENT;  Laterality: Bilateral;    MYOTOMY AND SUSPENSION OF HYOID Bilateral 9/27/2018    Procedure: Hyoid suspension;  Surgeon: Brandin Rosen MD;  Location: [facility] OR;  Service: ENT;  Laterality: Bilateral;    SPINE SURGERY  2008    Neck fusion C6/7??       Social History     Socioeconomic History    Marital status:      Spouse name: Not on file    Number of children: Not on file    Years of education: Not on file    Highest education level: Not on file   Occupational History    Not on file   Social Needs    Financial resource strain: Not on file    Food insecurity:     Worry: Not on file     Inability: Not on file    Transportation needs:     Medical: Not on file     Non-medical: Not on file   Tobacco Use    Smoking status: Former Smoker     Packs/day: 0.50     Types: Vaping with nicotine    Smokeless tobacco: Former User   Substance and Sexual Activity    Alcohol use: Yes     Alcohol/week: 12.0 standard drinks     Types: 12 Cans of beer per week     Comment: weekly    Drug use: No    Sexual activity: Not on file   Lifestyle    Physical activity:     Days per week: Not on file      Minutes per session: Not on file    Stress: Not on file   Relationships    Social connections:     Talks on phone: Not on file     Gets together: Not on file     Attends Restorationism service: Not on file     Active member of club or organization: Not on file     Attends meetings of clubs or organizations: Not on file     Relationship status: Not on file   Other Topics Concern    Not on file   Social History Narrative    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          Medications/Allergies: See med card    Vitals:    01/03/20 1116   BP: 133/85   Pulse: 85   Resp: 13   Weight: 99.4 kg (219 lb 2.2 oz)   Height: 6' (1.829 m)   PainSc:   6   PainLoc: Back         Physical exam:  Gen: A and O x3, pleasant, well-groomed  Skin: No rashes or obvious lesions  HEENT: PERRLA, no obvious deformities on ears or in canals.Trachea midline.  CVS: Regular rate and rhythm, normal palpable pulses.  Resp: Clear to auscultation bilaterally, no wheezes or rales.  Abdomen: Soft, NT/ND.  Musculoskeletal: Able to heel walk, toe walk. No antalgic gait.     Neuro:  Upper extremities: 5/5 strength bilaterally   Reflexes: Brachioradialis 1+, Bicep 1+, Tricep 1+.   Sensory: Intact and symmetrical to light touch and pinprick in C2-T1 dermatomes bilaterally.    Cervical Spine:  Cervical spine: ROM is full in flexion, extension and lateral rotation with pain in the left scapular region during left lateral rotation but much worse with extension.  Spurling's maneuver causes no neck pain to either side.  Myofascial exam: No Tenderness to palpation across cervical paraspinous region bilaterally.  Mild tenderness to palpation to the left upper trapezius muscle but exquisite tenderness to palpation to the left scapular region.  Multiple trigger points are noted.    Lumbar exam:  Range of motion is severely reduced with extension with increased pain especially on oblique extension to either side but also severely reduced with flexion with  any forward flexion causing increased back pain and left leg pain.  Straight leg raise positive for low back pain and left leg pain with either leg.  Michael's test negative bilaterally.      [email or web address] CT cervical spine  Vertebral column: Redemonstrated are postsurgical changes of anterior interbody fusion of C5 and C6.  Fusion hardware does result in streak artifact.  There is no suspected loosening or infection.  The vertebral bodies maintain normal height and alignment.  The odontoid process is intact.  There is mild disc space narrowing at the C4-5 and C6-7 levels.  Spinal canal, cord, epidural space: The spinal canal is developmentally normal.  There is no obvious abnormal epidural mass or fluid collection.  Findings by level:  C1-2: Alignment is normal.  There is no significant joint space narrowing.  C2-3: There is no spinal canal or foraminal stenosis.  C3-4: There is a minimal disc bulge. There is mild bilateral uncovertebral spurring and mild facet joint arthropathy. There is no spinal stenosis. There may be mild right foraminal stenosis.  C4-5: There is no spinal canal or significant foraminal stenosis.  C5-6: There is bilateral uncovertebral spurring. There is a mild disc osteophyte. There is artifact related to fusion hardware. There is no significant spinal stenosis. There is mild-to-moderate right and mild left foraminal stenosis suspected.  C6-7: There is left greater than right uncovertebral spurring with mild facet joint arthropathy.  There is a mild disc osteophyte complex.  There is no spinal stenosis.  There is mild bilateral foraminal stenosis.  C7-T1: There is mild facet joint arthropathy.  There is no spinal canal or significant foraminal stenosis.    19 MRI C-spine:  C2-C3: No significant central canal or neural foraminal narrowing.  C3-C4: Mild right uncovertebral spurring and bilateral facet arthropathy resulting and mild right-sided neural foraminal narrowing.  No  significant spinal canal narrowing.  C4-C7 levels are obscured by beam hardening artifact.  C7-T1: No significant central canal or neural foraminal narrowing.    11/26/19 MRI L-spine:  Vertebrae: Mild concavity of the superior endplate of L1 with prominent Schmorl's node.  Hemangioma noted within the L5 vertebral body.  Degenerative endplate changes are noted at L5-S1.  Otherwise, vertebrae demonstrate normal marrow signal without fracture or destructive process.  Discs: Mild disc space narrowing at L5-S1 with disc desiccation.  Remaining discs appearance.  Cord: Visualized cord demonstrates normal signal.  Conus terminates at L2.  Degenerative findings:  T12-L4: No significant central canal or neural foraminal narrowing.  L4-5: Broad-based disc bulge with central annular fissure and disc protrusion and bilateral facet arthropathy, resulting in mild bilateral neural foraminal narrowing.  L5-S1: Broad-based disc bulge with left paracentral annular fissure and disc protrusion and bilateral facet arthropathy resulting in mild bilateral neural foraminal narrowing.    Assessment:  The patient is a 49-year-old male with past medical history significant for C5/6 cervical spine surgery in 2008 who presents in referral from Bhavna Wild PA-C for left upper back pain.    1. Lumbar radiculopathy  Vital signs    Verify informed consent    Notify physician     Notify physician     Notify physician (specify)    Diet NPO    Case Request Operating Room: Injection-steroid-epidural-lumbar L5/S1    Place in Outpatient    alprazolam ODT dissolvable tablet 1 mg   2. DDD (degenerative disc disease), lumbar     3. Lumbar spondylosis         Plan:  1.  We discussed that he has had some relief but not 100%, reviewed his lumbar spine MRI and I believe that the majority of his pain is still coming from his disc bulging at L5/S1 with foraminal narrowing out to the left side and also his disc bulge at L4/5 that appears to possibly contact  the descending L5 nerve root.  We discussed trying 1 more epidural steroid injection to see if this eliminates his symptoms.  We discussed that if this resolves his leg pain, I think his remaining back pain could be facet mediated.  We discussed trying 1 more L5/S1 VONNIE with spread to the left side, and I will have him return in several weeks afterwards.

## 2020-01-03 NOTE — PROGRESS NOTES
This note was completed with dictation software and grammatical errors may exist.    CC:  Bilateral upper back and neck pain, low back pain and left leg pain    HPI:  The patient is a 49-year-old male with past medical history significant for C5/6 cervical spine surgery in 2008 who presents in referral from Bhavna Wild PA-C for left upper back pain. He returns in follow-up today, he is status post L5/S1 VONNIE on 12/13/2019 with 70% relief of his left leg pain, 50% relief of his back pain.  He continues to have pain worse with standing, worse with certain movements, does continue to shoot into his leg but is not as intense.  Unfortunately he is still having significant impairment with bending forward or doing any twisting.  He is having difficulty becoming comfortable, has problems with sitting for too long that he actually has to stand frequently.  He denies any reji weakness, no bowel or bladder incontinence.        Previous history:  He reports having a very mild baseline degree of pain that has been present since his neck surgery in 2008 in the  but this was very tolerable until December 2018 when he developed severe pain in the left scapular region.  He does not have significant neck pain today but reports constant pain in the left scapular region.  This is significantly worse with looking to the left and looking up.  He describes it is grabbing, tight, also worse 1st things in the morning and improved with lying down.  He has been taking Flexeril without significant relief and reports drowsiness with this.  He has some numbness and weakness in the left arm and hand in an ulnar distribution.  He denies bladder or bowel incontinence.    Pain intervention history:  C5-6 artificial disc/flexible disc spacer in 2008.  He has taken Flexeril without significant relief and this causes drowsiness.  He has done physical therapy in the past over the years and has continued doing these exercises for both his  neck and his back.  In about 2010, he had undergone a lumbar injection which sounds like an epidural steroid injection and reports having almost 100% relief with this for one year.    ROS:  The patient reports left upper back pain.  Balance of review of systems is negative.    Past Medical History:   Diagnosis Date    GERD (gastroesophageal reflux disease)     Insomnia     Sleep apnea     cpap       Past Surgical History:   Procedure Laterality Date    COLONOSCOPY  2011    EPIDURAL STEROID INJECTION INTO LUMBAR SPINE N/A 12/13/2019    Procedure: Injection-steroid-epidural-lumbar;  Surgeon: Juan Carlos Romeo MD;  Location: [facility] OR;  Service: Pain Management;  Laterality: N/A;  L5/S1 L>R    LARYNGOSCOPY Bilateral 11/2/2018    Procedure: Sleep endoscopy;  Surgeon: Brandin Rosen MD;  Location: [facility] OR;  Service: ENT;  Laterality: Bilateral;    MYOTOMY AND SUSPENSION OF HYOID Bilateral 9/27/2018    Procedure: Hyoid suspension;  Surgeon: Brandin Rosen MD;  Location: [facility] OR;  Service: ENT;  Laterality: Bilateral;    SPINE SURGERY  2008    Neck fusion C6/7??       Social History     Socioeconomic History    Marital status:      Spouse name: Not on file    Number of children: Not on file    Years of education: Not on file    Highest education level: Not on file   Occupational History    Not on file   Social Needs    Financial resource strain: Not on file    Food insecurity:     Worry: Not on file     Inability: Not on file    Transportation needs:     Medical: Not on file     Non-medical: Not on file   Tobacco Use    Smoking status: Former Smoker     Packs/day: 0.50     Types: Vaping with nicotine    Smokeless tobacco: Former User   Substance and Sexual Activity    Alcohol use: Yes     Alcohol/week: 12.0 standard drinks     Types: 12 Cans of beer per week     Comment: weekly    Drug use: No    Sexual activity: Not on file   Lifestyle    Physical activity:     Days per week: Not on file      Minutes per session: Not on file    Stress: Not on file   Relationships    Social connections:     Talks on phone: Not on file     Gets together: Not on file     Attends Confucianist service: Not on file     Active member of club or organization: Not on file     Attends meetings of clubs or organizations: Not on file     Relationship status: Not on file   Other Topics Concern    Not on file   Social History Narrative    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          Medications/Allergies: See med card    Vitals:    01/03/20 1116   BP: 133/85   Pulse: 85   Resp: 13   Weight: 99.4 kg (219 lb 2.2 oz)   Height: 6' (1.829 m)   PainSc:   6   PainLoc: Back         Physical exam:  Gen: A and O x3, pleasant, well-groomed  Skin: No rashes or obvious lesions  HEENT: PERRLA, no obvious deformities on ears or in canals.Trachea midline.  CVS: Regular rate and rhythm, normal palpable pulses.  Resp: Clear to auscultation bilaterally, no wheezes or rales.  Abdomen: Soft, NT/ND.  Musculoskeletal: Able to heel walk, toe walk. No antalgic gait.     Neuro:  Upper extremities: 5/5 strength bilaterally   Reflexes: Brachioradialis 1+, Bicep 1+, Tricep 1+.   Sensory: Intact and symmetrical to light touch and pinprick in C2-T1 dermatomes bilaterally.    Cervical Spine:  Cervical spine: ROM is full in flexion, extension and lateral rotation with pain in the left scapular region during left lateral rotation but much worse with extension.  Spurling's maneuver causes no neck pain to either side.  Myofascial exam: No Tenderness to palpation across cervical paraspinous region bilaterally.  Mild tenderness to palpation to the left upper trapezius muscle but exquisite tenderness to palpation to the left scapular region.  Multiple trigger points are noted.    Lumbar exam:  Range of motion is severely reduced with extension with increased pain especially on oblique extension to either side but also severely reduced with flexion with  any forward flexion causing increased back pain and left leg pain.  Straight leg raise positive for low back pain and left leg pain with either leg.  Michael's test negative bilaterally.      Imagin2019 CT cervical spine  Vertebral column: Redemonstrated are postsurgical changes of anterior interbody fusion of C5 and C6.  Fusion hardware does result in streak artifact.  There is no suspected loosening or infection.  The vertebral bodies maintain normal height and alignment.  The odontoid process is intact.  There is mild disc space narrowing at the C4-5 and C6-7 levels.  Spinal canal, cord, epidural space: The spinal canal is developmentally normal.  There is no obvious abnormal epidural mass or fluid collection.  Findings by level:  C1-2: Alignment is normal.  There is no significant joint space narrowing.  C2-3: There is no spinal canal or foraminal stenosis.  C3-4: There is a minimal disc bulge. There is mild bilateral uncovertebral spurring and mild facet joint arthropathy. There is no spinal stenosis. There may be mild right foraminal stenosis.  C4-5: There is no spinal canal or significant foraminal stenosis.  C5-6: There is bilateral uncovertebral spurring. There is a mild disc osteophyte. There is artifact related to fusion hardware. There is no significant spinal stenosis. There is mild-to-moderate right and mild left foraminal stenosis suspected.  C6-7: There is left greater than right uncovertebral spurring with mild facet joint arthropathy.  There is a mild disc osteophyte complex.  There is no spinal stenosis.  There is mild bilateral foraminal stenosis.  C7-T1: There is mild facet joint arthropathy.  There is no spinal canal or significant foraminal stenosis.    19 MRI C-spine:  C2-C3: No significant central canal or neural foraminal narrowing.  C3-C4: Mild right uncovertebral spurring and bilateral facet arthropathy resulting and mild right-sided neural foraminal narrowing.  No  significant spinal canal narrowing.  C4-C7 levels are obscured by beam hardening artifact.  C7-T1: No significant central canal or neural foraminal narrowing.    11/26/19 MRI L-spine:  Vertebrae: Mild concavity of the superior endplate of L1 with prominent Schmorl's node.  Hemangioma noted within the L5 vertebral body.  Degenerative endplate changes are noted at L5-S1.  Otherwise, vertebrae demonstrate normal marrow signal without fracture or destructive process.  Discs: Mild disc space narrowing at L5-S1 with disc desiccation.  Remaining discs appearance.  Cord: Visualized cord demonstrates normal signal.  Conus terminates at L2.  Degenerative findings:  T12-L4: No significant central canal or neural foraminal narrowing.  L4-5: Broad-based disc bulge with central annular fissure and disc protrusion and bilateral facet arthropathy, resulting in mild bilateral neural foraminal narrowing.  L5-S1: Broad-based disc bulge with left paracentral annular fissure and disc protrusion and bilateral facet arthropathy resulting in mild bilateral neural foraminal narrowing.    Assessment:  The patient is a 49-year-old male with past medical history significant for C5/6 cervical spine surgery in 2008 who presents in referral from Bhavna Wild PA-C for left upper back pain.    1. Lumbar radiculopathy  Vital signs    Verify informed consent    Notify physician     Notify physician     Notify physician (specify)    Diet NPO    Case Request Operating Room: Injection-steroid-epidural-lumbar L5/S1    Place in Outpatient    alprazolam ODT dissolvable tablet 1 mg   2. DDD (degenerative disc disease), lumbar     3. Lumbar spondylosis         Plan:  1.  We discussed that he has had some relief but not 100%, reviewed his lumbar spine MRI and I believe that the majority of his pain is still coming from his disc bulging at L5/S1 with foraminal narrowing out to the left side and also his disc bulge at L4/5 that appears to possibly contact  the descending L5 nerve root.  We discussed trying 1 more epidural steroid injection to see if this eliminates his symptoms.  We discussed that if this resolves his leg pain, I think his remaining back pain could be facet mediated.  We discussed trying 1 more L5/S1 VONNIE with spread to the left side, and I will have him return in several weeks afterwards.

## 2020-01-09 RX ORDER — CYCLOBENZAPRINE HCL 10 MG
10 TABLET ORAL 3 TIMES DAILY PRN
COMMUNITY
End: 2020-07-23 | Stop reason: SDUPTHER

## 2020-01-10 ENCOUNTER — TELEPHONE (OUTPATIENT)
Dept: SURGERY | Facility: HOSPITAL | Age: 50
End: 2020-01-10

## 2020-01-10 NOTE — TELEPHONE ENCOUNTER
Patient called the pre op center this afternoon wanting to reschedule his procedure for Jan.13th. He asked that someone from the office please call him to reschedule. He stated that Jan.20th or the 27th would work best for him.  Thank you

## 2020-01-27 ENCOUNTER — HOSPITAL ENCOUNTER (OUTPATIENT)
Facility: HOSPITAL | Age: 50
Discharge: HOME | End: 2020-01-27
Attending: ANESTHESIOLOGY | Admitting: ANESTHESIOLOGY
Payer: OTHER GOVERNMENT

## 2020-01-27 ENCOUNTER — HOSPITAL ENCOUNTER (OUTPATIENT)
Dept: RADIOLOGY | Facility: HOSPITAL | Age: 50
Discharge: HOME | End: 2020-01-27
Attending: ANESTHESIOLOGY | Admitting: ANESTHESIOLOGY
Payer: OTHER GOVERNMENT

## 2020-01-27 DIAGNOSIS — M54.16 LUMBAR RADICULOPATHY: ICD-10-CM

## 2020-01-27 DIAGNOSIS — M54.16 LUMBAR RADICULOPATHY: Primary | ICD-10-CM

## 2020-01-27 PROCEDURE — 25500020 PHARM REV CODE 255: Mod: PO | Performed by: ANESTHESIOLOGY

## 2020-01-27 PROCEDURE — 76000 FLUOROSCOPY <1 HR PHYS/QHP: CPT | Mod: TC,PO

## 2020-01-27 PROCEDURE — A4216 STERILE WATER/SALINE, 10 ML: HCPCS | Mod: PO | Performed by: ANESTHESIOLOGY

## 2020-01-27 PROCEDURE — 62323 NJX INTERLAMINAR LMBR/SAC: CPT | Mod: ,,, | Performed by: ANESTHESIOLOGY

## 2020-01-27 PROCEDURE — 63600175 PHARM REV CODE 636 W HCPCS: Mod: PO | Performed by: ANESTHESIOLOGY

## 2020-01-27 PROCEDURE — 25000003 PHARM REV CODE 250: Mod: PO | Performed by: ANESTHESIOLOGY

## 2020-01-27 PROCEDURE — 62323 PR INJ LUMBAR/SACRAL, W/IMAGING GUIDANCE: ICD-10-PCS | Mod: ,,, | Performed by: ANESTHESIOLOGY

## 2020-01-27 PROCEDURE — 62323 NJX INTERLAMINAR LMBR/SAC: CPT | Mod: PO | Performed by: ANESTHESIOLOGY

## 2020-01-27 RX ORDER — METHYLPREDNISOLONE ACETATE 80 MG/ML
INJECTION, SUSPENSION INTRA-ARTICULAR; INTRALESIONAL; INTRAMUSCULAR; SOFT TISSUE
Status: DISCONTINUED | OUTPATIENT
Start: 2020-01-27 | End: 2020-01-27 | Stop reason: HOSPADM

## 2020-01-27 RX ORDER — ALPRAZOLAM 0.5 MG/1
1 TABLET, ORALLY DISINTEGRATING ORAL ONCE AS NEEDED
Status: COMPLETED | OUTPATIENT
Start: 2020-01-27 | End: 2020-01-27

## 2020-01-27 RX ORDER — LIDOCAINE HYDROCHLORIDE 10 MG/ML
INJECTION, SOLUTION EPIDURAL; INFILTRATION; INTRACAUDAL; PERINEURAL
Status: DISCONTINUED | OUTPATIENT
Start: 2020-01-27 | End: 2020-01-27 | Stop reason: HOSPADM

## 2020-01-27 RX ORDER — SODIUM CHLORIDE 9 MG/ML
INJECTION, SOLUTION INTRAMUSCULAR; INTRAVENOUS; SUBCUTANEOUS
Status: DISCONTINUED | OUTPATIENT
Start: 2020-01-27 | End: 2020-01-27 | Stop reason: HOSPADM

## 2020-01-27 RX ADMIN — ALPRAZOLAM 1 MG: 0.5 TABLET, ORALLY DISINTEGRATING ORAL at 02:01

## 2020-01-27 NOTE — OP NOTE

## 2020-01-27 NOTE — DISCHARGE INSTRUCTIONS
PAIN MANAGEMENT    Home care instructions   Apply ice pack to the injection site for 20 minute prior for the first 24 hours for soreness/discomfort at               injection site   DO NOT USE HEAT FOR 24 HOURS   Keep site clean and dry for 24 hours              MAY SHOWER TOMORROW   Do not drive until tomorrow  Take care when walking after YOUR LUMBAR LOWER BACK STEROID INJECTION    STEROIDS OR RADIOFREQUENCY    May take 10-14 days for full effects  Avoid strenuous exercises for 2 days    Resume Aspirin, Plavix, or Coumadin the day after the procedure unless other wise instructed  Resume home medication as prescribed today      CALL PHYSICIAN FOR:   Severe increase in your usual pain or appearance of new pain   Prolonged or increasing weakness or numbness in the legs or arms   Fever greater then 100 degrees F..   Drainage from the incision site, redness, active bleeding or increased swelling at the injection site   Headache that increases when your head is upright and decreases when you lie flat    FOR EMERGENCIES:   Go directly to Emergency Department for Shortness of breath, chest pain, or problems breathing

## 2020-01-27 NOTE — DISCHARGE SUMMARY
[facility]  Discharge Note  Short Stay    Admit Date: 1/27/2020    Discharge Date: 1/27/2020    Attending Physician: Juan Carlos Romeo MD     Discharge Provider: Juan Carlos Romeo    Diagnoses:  Active Hospital Problems    Diagnosis  POA    *Lumbar radiculopathy [M54.16]  Yes      Resolved Hospital Problems   No resolved problems to display.       Discharged Condition: good    Final Diagnoses: Lumbar radiculopathy [M54.16]    Disposition: Home or Self Care    Hospital Course: no complications, uneventful    Outcome of Hospitalization, Treatment, Procedure, or Surgery:  Patient was admitted for outpatient procedure. The patient underwent procedure without complications and are discharged home    Follow up/Patient Instructions:  Follow up as scheduled in Pain Management clinic in 3-4 weeks/Patient has received instructions and follow up date and time    Medications:  Continue previous medications    Discharge Procedure Orders   Call MD for:  temperature >100.4     Call MD for:  severe uncontrolled pain     Call MD for:  redness, tenderness, or signs of infection (pain, swelling, redness, odor or green/yellow discharge around incision site)     Call MD for:  severe persistent headache     No dressing needed         Discharge Procedure Orders (must include Diet, Follow-up, Activity):   Discharge Procedure Orders (must include Diet, Follow-up, Activity)   Call MD for:  temperature >100.4     Call MD for:  severe uncontrolled pain     Call MD for:  redness, tenderness, or signs of infection (pain, swelling, redness, odor or green/yellow discharge around incision site)     Call MD for:  severe persistent headache     No dressing needed

## 2020-01-28 VITALS
HEART RATE: 70 BPM | SYSTOLIC BLOOD PRESSURE: 125 MMHG | OXYGEN SATURATION: 98 % | TEMPERATURE: 98 F | RESPIRATION RATE: 15 BRPM | BODY MASS INDEX: 27.09 KG/M2 | HEIGHT: 72 IN | DIASTOLIC BLOOD PRESSURE: 74 MMHG | WEIGHT: 200 LBS

## 2020-02-13 ENCOUNTER — PATIENT OUTREACH (OUTPATIENT)
Dept: ADMINISTRATIVE | Facility: OTHER | Age: 50
End: 2020-02-13

## 2020-02-17 ENCOUNTER — TELEPHONE (OUTPATIENT)
Dept: PAIN MEDICINE | Facility: CLINIC | Age: 50
End: 2020-02-17

## 2020-02-17 NOTE — TELEPHONE ENCOUNTER
----- Message from Lashae Colvin sent at 2/14/2020  9:46 AM CST -----  Contact: pt  Type: Needs Medical Advice    Who Called:  pt  Best Call Back Number:[phone] (home)   Additional Information: pt is requesting a call back , needs more  Information appt.

## 2020-02-17 NOTE — TELEPHONE ENCOUNTER
Patient would like a prescription for lidocaine patches. Advised patient that insurance may not cover this and he would like one sent anyway to see if they will pay for this.

## 2020-02-18 RX ORDER — LIDOCAINE 50 MG/G
1 PATCH TOPICAL DAILY
Qty: 30 PATCH | Refills: 3 | Status: SHIPPED | OUTPATIENT
Start: 2020-02-18

## 2020-02-18 NOTE — TELEPHONE ENCOUNTER
Also let him know that there are some over-the-counter lidocaine patches, I think there was sold by [facility]

## 2020-03-10 ENCOUNTER — TELEPHONE (OUTPATIENT)
Dept: FAMILY MEDICINE | Facility: CLINIC | Age: 50
End: 2020-03-10

## 2020-03-10 DIAGNOSIS — L98.9 SKIN LESION: Primary | ICD-10-CM

## 2020-03-10 NOTE — TELEPHONE ENCOUNTER
----- Message from Lula Mcadams sent at 3/10/2020  4:52 PM CDT -----  Contact: self [phone]   Type:  Patient Requesting Referral    Who Called:  ARIADNE PERDOMO [[id number]]  Does the patient already have the specialty appointment scheduled?:  no  Referral to What Specialty:  Derm   Reason for Referral:  Check spot on skin   Does the patient want the referral with a specific physician?:  No   [facility]   Patient Requesting a Call Back?:yes   Best Call Back Number:  [phone]

## 2020-03-13 NOTE — TELEPHONE ENCOUNTER
Please schedule referral   [facility] [place] [place] [facility] - MARK Mahoney / MARK Valles / IMELDA Lamas/ MARK Lozano/ MARK Syed/ RYLEE Monique / SUSANNA Escoto / FAHAD Castaneda  ---------------------------------------------------------------------------------------------------------------  seen and examined today for ESRD on HD  Interval : still has abdominal discomfort  VITALS:  T(F): 98.5 (09-07-19 @ 06:16), Max: 99.1 (09-06-19 @ 21:30)  HR: 88 (09-07-19 @ 06:16)  BP: 150/62 (09-07-19 @ 06:16)  RR: 19 (09-07-19 @ 06:16)  SpO2: 100% (09-07-19 @ 06:16)  Wt(kg): --    09-06 @ 07:01  -  09-07 @ 07:00  --------------------------------------------------------  IN: 400 mL / OUT: 2900 mL / NET: -2500 mL      Physical Exam :-  Constitutional: NAD  Neck: Supple.  Respiratory: Bilateral equal breath sounds,  Cardiovascular: S1, S2 normal,  Gastrointestinal: Bowel Sounds present, soft, non tender.  Extremities: No edema  Neurological: Alert and Oriented x 3, no focal deficits  Psychiatric: Normal mood, normal affect  Data:-  Allergies :   No Known Allergies    Hospital Medications:   MEDICATIONS  (STANDING):  cefTRIAXone   IVPB 1000 milliGRAM(s) IV Intermittent every 24 hours  chlorhexidine 4% Liquid 1 Application(s) Topical <User Schedule>  clopidogrel Tablet 75 milliGRAM(s) Oral daily  dorzolamide 2%/timolol 0.5% Ophthalmic Solution 1 Drop(s) Both EYES two times a day  fentaNYL   Patch  75 MICROgram(s)/Hr 1 Patch Transdermal every 72 hours  gabapentin 100 milliGRAM(s) Oral three times a day  heparin  Injectable 5000 Unit(s) SubCutaneous three times a day  hydrALAZINE 100 milliGRAM(s) Oral three times a day  influenza   Vaccine 0.5 milliLiter(s) IntraMuscular once  levothyroxine 50 MICROGram(s) Oral daily  metoprolol tartrate 75 milliGRAM(s) Oral two times a day  multivitamin 1 Tablet(s) Oral daily  NIFEdipine XL 60 milliGRAM(s) Oral daily  pantoprazole    Tablet 40 milliGRAM(s) Oral before breakfast  sevelamer carbonate 1600 milliGRAM(s) Oral three times a day  simvastatin 20 milliGRAM(s) Oral at bedtime    09-07    136  |  91<L>  |  29<H>  ----------------------------<  100<H>  3.7   |  30  |  2.71<H>    Ca    10.2      07 Sep 2019 06:40  Phos  2.9     09-07  Mg     2.3     09-07    TPro  8.1  /  Alb  3.9  /  TBili  0.3  /  DBili      /  AST  7   /  ALT  8   /  AlkPhos  161<H>  09-06    Creatinine Trend: 2.71 <--, 4.40 <--                        8.4    9.23  )-----------( 429      ( 07 Sep 2019 06:40 )             27.7

## 2020-07-08 ENCOUNTER — LAB VISIT (OUTPATIENT)
Dept: SURGERY | Facility: CLINIC | Age: 50
End: 2020-07-08
Payer: OTHER GOVERNMENT

## 2020-07-08 ENCOUNTER — NURSE TRIAGE (OUTPATIENT)
Dept: ADMINISTRATIVE | Facility: CLINIC | Age: 50
End: 2020-07-08

## 2020-07-08 ENCOUNTER — TELEPHONE (OUTPATIENT)
Dept: FAMILY MEDICINE | Facility: CLINIC | Age: 50
End: 2020-07-08

## 2020-07-08 DIAGNOSIS — R50.9 FEVER: ICD-10-CM

## 2020-07-08 DIAGNOSIS — R43.9 PROBLEMS WITH SMELL AND TASTE: ICD-10-CM

## 2020-07-08 DIAGNOSIS — Z20.822 EXPOSURE TO COVID-19 VIRUS: Primary | ICD-10-CM

## 2020-07-08 DIAGNOSIS — R06.02 SHORTNESS OF BREATH: ICD-10-CM

## 2020-07-08 DIAGNOSIS — R05.9 COUGH: ICD-10-CM

## 2020-07-08 DIAGNOSIS — Z20.822 EXPOSURE TO COVID-19 VIRUS: ICD-10-CM

## 2020-07-08 LAB — SARS-COV-2 RNA RESP QL NAA+PROBE: NOT DETECTED

## 2020-07-08 PROCEDURE — U0003 INFECTIOUS AGENT DETECTION BY NUCLEIC ACID (DNA OR RNA); SEVERE ACUTE RESPIRATORY SYNDROME CORONAVIRUS 2 (SARS-COV-2) (CORONAVIRUS DISEASE [COVID-19]), AMPLIFIED PROBE TECHNIQUE, MAKING USE OF HIGH THROUGHPUT TECHNOLOGIES AS DESCRIBED BY CMS-2020-01-R: HCPCS

## 2020-07-08 NOTE — TELEPHONE ENCOUNTER
Works for [employer] is headed to location at main campus and is requesting rapid testing; instructed pt needs an order for testing and MD has to eval and decide. Reports trouble focusing, headache, body aches,red eyes, and SOB x 2 days. Exposure was 6 days ago(last Thursday) protocol advice given and  pt verbalizes understanding.         Reason for Disposition   [1] Symptoms of COVID-19 (e.g., cough, fever, SOB, or others) AND [2] within 14 days of EXPOSURE (close contact) with diagnosed or suspected COVID-19 patient   MODERATE difficulty breathing (e.g., speaks in phrases, SOB even at rest, pulse 100-120)    Additional Information   Negative: SEVERE difficulty breathing (e.g., struggling for each breath, speaks in single words)   Negative: Difficult to awaken or acting confused (e.g., disoriented, slurred speech)   Negative: Bluish (or gray) lips or face now   Negative: Shock suspected (e.g., cold/pale/clammy skin, too weak to stand, low BP, rapid pulse)   Negative: Sounds like a life-threatening emergency to the triager   Negative: SEVERE or constant chest pain or pressure (Exception: mild central chest pain, present only when coughing)    Protocols used: CORONAVIRUS (COVID-19) EXPOSURE-A-OH, CORONAVIRUS (COVID-19) DIAGNOSED OR [id number]

## 2020-07-08 NOTE — TELEPHONE ENCOUNTER
----- Message from Kris Tate sent at 7/8/2020  7:22 AM CDT -----  Regarding: Order for Covid19 test  Contact: patient  Patient called in and stated he would like to get Dr. Wong to put an order in system for the Covid19 test.  Patient stated he is on the [place] today & will be going to one of the [facility] sites.    Patient call back number is [phone]

## 2020-07-09 NOTE — TELEPHONE ENCOUNTER
Patient seen in test center, test completed.     Called to patient, advised that result was negative.     Voiced understanding.

## 2020-07-21 ENCOUNTER — TELEPHONE (OUTPATIENT)
Dept: FAMILY MEDICINE | Facility: CLINIC | Age: 50
End: 2020-07-21

## 2020-07-21 NOTE — TELEPHONE ENCOUNTER
----- Message from Ramandeep Leon sent at 7/21/2020  3:53 PM CDT -----  Contact: pt  Type: Needs Medical Advice    Who Called:  PT  Best Call Back Number: [phone]  Additional Information: Requesting a call back regarding getting an order for MRi  Please Advise ---Thank you

## 2020-07-22 ENCOUNTER — TELEPHONE (OUTPATIENT)
Dept: FAMILY MEDICINE | Facility: CLINIC | Age: 50
End: 2020-07-22

## 2020-07-22 NOTE — TELEPHONE ENCOUNTER
Called pt about trying to get an MRI.  Has been seeing a chiropractor for back pain.  Left arm gets a lot of pain from the elbow to the shoulder when he coughs or sneezes.  Chiropractor suggested he get the MRI to check for impingement.  Has had several referrals for back issues.    He did have an MRI in November but the pain is significantly worse and new.  I informed him that Dr. Wong is out of the office this week.  He has also been seen by Christal Do NP.  I advised him I will send the information to both Dr. Wong and DUSTIN Do.

## 2020-07-23 RX ORDER — CYCLOBENZAPRINE HCL 10 MG
10 TABLET ORAL 3 TIMES DAILY PRN
Qty: 30 TABLET | Refills: 0 | Status: SHIPPED | OUTPATIENT
Start: 2020-07-23 | End: 2020-07-24 | Stop reason: SDUPTHER

## 2020-07-23 NOTE — TELEPHONE ENCOUNTER
Spoke with pt, I advised him that since he already saw orthopedics that he can just call our main number and get scheduled in to seem them without a referral. Also ststaed he was low on flexiril. So I have pend the medication and sent to a dr to be sent in.

## 2020-07-23 NOTE — TELEPHONE ENCOUNTER
Pt states needs a refill on flexeril. I have pend the medication. He states Dr. sanchez usually is the one that refills the medication. Please advise. Thank you  LOV:10/14/2019  NOV: none

## 2020-07-24 RX ORDER — CYCLOBENZAPRINE HCL 10 MG
10 TABLET ORAL 3 TIMES DAILY PRN
Qty: 30 TABLET | Refills: 0 | Status: SHIPPED | OUTPATIENT
Start: 2020-07-24 | End: 2020-08-06 | Stop reason: SDUPTHER

## 2020-07-24 NOTE — TELEPHONE ENCOUNTER
The patient is not been seen since October, I only can fax 30 tablets, he will have to discuss with his primary care physician for 90 days.  Thank you

## 2020-07-24 NOTE — TELEPHONE ENCOUNTER
Advised that Dr Wong is out and that the covering provider is only issuing a 1 month supply till his return.   Patient reports he pays the same amount for 30 days as he does for 90 days and wants script sent in a 90 day supply.     However, the last refill in March was for #60 tabs.     Pended as previously prescribed for review.     Dr Hammond prescribed for patient on 7/2 for count of #30 and is out of office.

## 2020-07-24 NOTE — TELEPHONE ENCOUNTER
----- Message from Kris Tate sent at 7/24/2020  8:44 AM CDT -----  Contact: patient  Patient called in and stated his Rx for cyclobenzaprine (FLEXERIL) 10 MG tablet was sent to his pharmacy  but it has to be resent as a 90 day supply.      [facility] - [place], [place] - [address] [place] [address]  Phone: [phone] Fax: [phone]    Patient call back number is [phone]

## 2020-07-27 ENCOUNTER — TELEPHONE (OUTPATIENT)
Dept: FAMILY MEDICINE | Facility: CLINIC | Age: 50
End: 2020-07-27

## 2020-07-27 NOTE — TELEPHONE ENCOUNTER
----- Message from Nichelle Ayers sent at 7/27/2020 10:38 AM CDT -----  Type:  RX Refill Request    Who Called:  Patient   Refill or New Rx:  refill  RX Name and Strength:  cyclobenzaprine (FLEXERIL) 10 MG tablet  How is the patient currently taking it? (ex. 1XDay):  as directed   Is this a 30 day or 90 day RX:  90  Preferred Pharmacy with phone number:    [facility] - [address] [address]  Phone: [phone] Fax: [phone]  Local or Mail Order:  mail  Ordering Provider:  daniel Hough Call Back Number:  [phone]  Additional Information:  Please advise-thank you

## 2020-08-06 NOTE — TELEPHONE ENCOUNTER
----- Message from Lula Mcadams sent at 8/6/2020  9:00 AM CDT -----  Regarding: refill  Contact: ARIADNE PERDOMO [[id number]]  Patient requesting a refill on flexeril 90 day supply.  Patient stated this is the 5th time he has called.     Patient will be using   [facility] - [place], [place] - [address] [place] [address]  Phone: [phone] Fax: [phone]    Please call patient at [phone].     Thanks!

## 2020-08-07 RX ORDER — CYCLOBENZAPRINE HCL 10 MG
10 TABLET ORAL 3 TIMES DAILY PRN
Qty: 30 TABLET | Refills: 2 | Status: SHIPPED | OUTPATIENT
Start: 2020-08-07 | End: 2020-08-21 | Stop reason: SDUPTHER

## 2020-08-10 ENCOUNTER — OFFICE VISIT (OUTPATIENT)
Dept: SPINE | Facility: CLINIC | Age: 50
End: 2020-08-10
Payer: OTHER GOVERNMENT

## 2020-08-10 VITALS
DIASTOLIC BLOOD PRESSURE: 80 MMHG | TEMPERATURE: 98 F | BODY MASS INDEX: 30.34 KG/M2 | HEIGHT: 72 IN | SYSTOLIC BLOOD PRESSURE: 122 MMHG | HEART RATE: 82 BPM | WEIGHT: 224 LBS

## 2020-08-10 DIAGNOSIS — M47.816 LUMBAR SPONDYLOSIS: ICD-10-CM

## 2020-08-10 DIAGNOSIS — M54.42 CHRONIC BILATERAL LOW BACK PAIN WITH BILATERAL SCIATICA: ICD-10-CM

## 2020-08-10 DIAGNOSIS — M54.41 CHRONIC BILATERAL LOW BACK PAIN WITH BILATERAL SCIATICA: ICD-10-CM

## 2020-08-10 DIAGNOSIS — Z98.890 H/O CERVICAL SPINE SURGERY: Primary | ICD-10-CM

## 2020-08-10 DIAGNOSIS — M47.812 CERVICAL SPONDYLOSIS: ICD-10-CM

## 2020-08-10 DIAGNOSIS — G89.29 CHRONIC BILATERAL LOW BACK PAIN WITH BILATERAL SCIATICA: ICD-10-CM

## 2020-08-10 DIAGNOSIS — M54.12 CERVICAL RADICULOPATHY: ICD-10-CM

## 2020-08-10 DIAGNOSIS — M54.2 CERVICALGIA: ICD-10-CM

## 2020-08-10 PROCEDURE — 99214 OFFICE O/P EST MOD 30 MIN: CPT | Mod: S$PBB,,, | Performed by: PHYSICIAN ASSISTANT

## 2020-08-10 PROCEDURE — 99214 PR OFFICE/OUTPT VISIT, EST, LEVL IV, 30-39 MIN: ICD-10-PCS | Mod: S$PBB,,, | Performed by: PHYSICIAN ASSISTANT

## 2020-08-10 PROCEDURE — 99999 PR PBB SHADOW E&M-EST. PATIENT-LVL V: ICD-10-PCS | Mod: PBBFAC,,, | Performed by: PHYSICIAN ASSISTANT

## 2020-08-10 PROCEDURE — 99999 PR PBB SHADOW E&M-EST. PATIENT-LVL V: CPT | Mod: PBBFAC,,, | Performed by: PHYSICIAN ASSISTANT

## 2020-08-10 PROCEDURE — 99215 OFFICE O/P EST HI 40 MIN: CPT | Mod: PBBFAC,PN | Performed by: PHYSICIAN ASSISTANT

## 2020-08-10 NOTE — PROGRESS NOTES
Hello! We are asking you to complete following questionnaire prior to your upcoming virtual visit with Bhavna Wild. This questionnaire has been designed to give us information on how your low back or leg pain has affected your ability to manage everyday life. Please respond with only ONE number answer to each question which best applies to you TODAY. This will need to be completed and sent back prior to checking in for your appointment.    EXAMPLE OF RESPONSE:  Q1: 2       Q2: 1      Q3: 4     Q1: Pain Intensity 3  0 - No pain  1 - Very Mild pain   2 - Moderate Pain  3 - Fairly Severe Pain  4 - Very Severe Pain  5 - Worst Imaginable Pain    Q2: Personal Care (washing, dressing, etc) 2  0 - I can look after myself normally without causing extra pain  1 - I can look after myself normally but it causes extra pain  2 - It is painful to look after myself and I am slow and careful  3 - I need some help but manage most of my personal care   4 - I need help everyday in most aspects of self-care   5 - I do not get dressed, I wash with difficulty and stay in bed    Q3: Lifting 4  0 - I can lift heavy weights without extra pain  1 - I can lift heavy weights but it gives extra pain   2 - Pain prevents me from lifting heavy weights off the floor, but I can manage if they are conveniently placed (eg. on a table)  3 - Pain prevents me from lifting heavy weights, but I can manage light to medium weights if they are conveniently positioned   4 - I can lift very light weights   5 - I cannot lift or carry anything at all     Q4: Walking 1  0 - Pain does not prevent me walking any distance   1 - Pain prevents me from walking more than 1 mile  2 - Pain prevents me from walking more than 1/2 mile  3 - Pain prevents me from walking more than 100 yards  4 - I can only walk using a stick or crutches  5 - I am in bed most of the time    Q5: Sitting 3   0 - I can sit in any chair as long as I like   1 - I can only sit in my favorite chair  as long as I like   2 - Pain prevents me from sitting for more than one hour   3 - Pain prevents me from sitting for more than 30 minutes   4 - Pain prevents me from sitting for more than 10 minutes   5 - Pain prevents me from sitting at all     Q6: Standing 4  0 - I can stand as long as I want without extra pain  1 - I can stand as long as I want but it gives me extra pain  2 - Pain prevents me from standing for more than 1 hour   3 - Pain prevents me from standing for more than 30 minutes   4 - Pain prevents me from standing for more than 10 minutes   5 - Pain prevents me from standing at all     Q7: Sleeping 1   0 - My sleep is never disturbed by pain   1 - My sleep is occasionally disturbed by pain   2 - Because of pain, I have less than 6 hours of sleep  3 - Because of pain, I have less than 4 hours of sleep  4 - Because of pain, I have less than 2 hours of sleep  5 - Pain prevents me from sleeping at all    Q8: Sex Life (if applicable) 3  0 - My sex life is normal and causes no extra pain  1 - My sex life is normal but causes some extra pain   2 - My sex life is nearly normal but is very painful   3 - My sex life is severely restricted by pain  4 - My sex life is nearly absent because of pain  5 - Pain prevents any sex life at all    Q9: Social Life 3  0 - My social life is normal and gives me no extra pain  1 - My social life is normal but increases the degree of pain  2 - Pain has no significant effect on my social life apart from limiting my more energetic interests such as sports   3 - Pain has restricted my social life and I do not go out as often   4 - Pain has restricted my social life to my house  5 - I have no social life because of pain    Q10: Traveling 3   0 - I can travel anywhere without pain  1 - I can travel anywhere but it gives me extra pain   2 - Pain is bad but I manage journeys over 2 hours   3 - Pain restricts me to journeys of less than 1 hour  4 - Pain restricts me to short necessary  journeys under 30 minutes   5 - Pain prevents me from traveling except to receive treatment
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
## 2020-08-17 ENCOUNTER — TELEPHONE (OUTPATIENT)
Dept: SPINE | Facility: CLINIC | Age: 50
End: 2020-08-17

## 2020-08-17 NOTE — TELEPHONE ENCOUNTER
----- Message from Bhavna Wild PA-C sent at 8/14/2020  2:37 PM CDT -----  This patient was scheduled for an 8:00 and 8:30 visit on 8/20.  He needs one visit that is 60 minutes in length.  I have cancelled the 8:30 appointment and held the 8:30 slot so no one schedules there until the length of time can be corrected for an hour long appoint.

## 2020-08-18 DIAGNOSIS — M54.2 CERVICALGIA: Primary | ICD-10-CM

## 2020-08-18 DIAGNOSIS — M54.42 CHRONIC BILATERAL LOW BACK PAIN WITH LEFT-SIDED SCIATICA: ICD-10-CM

## 2020-08-18 DIAGNOSIS — M47.812 CERVICAL SPONDYLOSIS: ICD-10-CM

## 2020-08-18 DIAGNOSIS — M47.816 LUMBAR SPONDYLOSIS: ICD-10-CM

## 2020-08-18 DIAGNOSIS — Z98.890 H/O CERVICAL SPINE SURGERY: ICD-10-CM

## 2020-08-18 DIAGNOSIS — M54.12 CERVICAL RADICULOPATHY: ICD-10-CM

## 2020-08-18 DIAGNOSIS — G89.29 CHRONIC BILATERAL LOW BACK PAIN WITH LEFT-SIDED SCIATICA: ICD-10-CM

## 2020-08-18 NOTE — PROGRESS NOTES
Received a message that patient went to have EMG/ NCV tests today and the equiplment broke.  New orders requested to be placed to reschedule the tests.    Orders placed.

## 2020-08-19 ENCOUNTER — HOSPITAL ENCOUNTER (OUTPATIENT)
Dept: RADIOLOGY | Facility: HOSPITAL | Age: 50
Discharge: HOME | End: 2020-08-19
Attending: PHYSICIAN ASSISTANT
Payer: OTHER GOVERNMENT

## 2020-08-19 DIAGNOSIS — Z98.890 H/O CERVICAL SPINE SURGERY: ICD-10-CM

## 2020-08-19 DIAGNOSIS — M54.2 CERVICALGIA: ICD-10-CM

## 2020-08-19 DIAGNOSIS — M54.41 CHRONIC BILATERAL LOW BACK PAIN WITH BILATERAL SCIATICA: ICD-10-CM

## 2020-08-19 DIAGNOSIS — M47.812 CERVICAL SPONDYLOSIS: ICD-10-CM

## 2020-08-19 DIAGNOSIS — M54.42 CHRONIC BILATERAL LOW BACK PAIN WITH BILATERAL SCIATICA: ICD-10-CM

## 2020-08-19 DIAGNOSIS — M54.12 CERVICAL RADICULOPATHY: ICD-10-CM

## 2020-08-19 DIAGNOSIS — G89.29 CHRONIC BILATERAL LOW BACK PAIN WITH BILATERAL SCIATICA: ICD-10-CM

## 2020-08-19 DIAGNOSIS — M47.816 LUMBAR SPONDYLOSIS: ICD-10-CM

## 2020-08-19 PROCEDURE — 72052 X-RAY EXAM NECK SPINE 6/>VWS: CPT | Mod: TC,FY,PO

## 2020-08-19 PROCEDURE — 72110 XR LUMBAR SPINE 5 VIEW WITH FLEX AND EXT: ICD-10-PCS | Mod: 26,,, | Performed by: RADIOLOGY

## 2020-08-19 PROCEDURE — 72141 MRI NECK SPINE W/O DYE: CPT | Mod: 26,,, | Performed by: RADIOLOGY

## 2020-08-19 PROCEDURE — 72148 MRI LUMBAR SPINE W/O DYE: CPT | Mod: TC,PO

## 2020-08-19 PROCEDURE — 72125 CT NECK SPINE W/O DYE: CPT | Mod: TC,PO

## 2020-08-19 PROCEDURE — 72141 MRI NECK SPINE W/O DYE: CPT | Mod: TC,PO

## 2020-08-19 PROCEDURE — 72148 MRI LUMBAR SPINE WITHOUT CONTRAST: ICD-10-PCS | Mod: 26,,, | Performed by: RADIOLOGY

## 2020-08-19 PROCEDURE — 72125 CT CERVICAL SPINE WITHOUT CONTRAST: ICD-10-PCS | Mod: 26,,, | Performed by: RADIOLOGY

## 2020-08-19 PROCEDURE — 72052 XR CERVICAL SPINE 5 VIEW WITH FLEX AND EXT: ICD-10-PCS | Mod: 26,,, | Performed by: RADIOLOGY

## 2020-08-19 PROCEDURE — 72110 X-RAY EXAM L-2 SPINE 4/>VWS: CPT | Mod: TC,FY,PO

## 2020-08-19 PROCEDURE — 72141 MRI CERVICAL SPINE WITHOUT CONTRAST: ICD-10-PCS | Mod: 26,,, | Performed by: RADIOLOGY

## 2020-08-19 PROCEDURE — 72052 X-RAY EXAM NECK SPINE 6/>VWS: CPT | Mod: 26,,, | Performed by: RADIOLOGY

## 2020-08-19 PROCEDURE — 72110 X-RAY EXAM L-2 SPINE 4/>VWS: CPT | Mod: 26,,, | Performed by: RADIOLOGY

## 2020-08-19 PROCEDURE — 72148 MRI LUMBAR SPINE W/O DYE: CPT | Mod: 26,,, | Performed by: RADIOLOGY

## 2020-08-19 PROCEDURE — 72125 CT NECK SPINE W/O DYE: CPT | Mod: 26,,, | Performed by: RADIOLOGY

## 2020-08-23 RX ORDER — CYCLOBENZAPRINE HCL 10 MG
10 TABLET ORAL 3 TIMES DAILY PRN
Qty: 30 TABLET | Refills: 2 | Status: SHIPPED | OUTPATIENT
Start: 2020-08-23 | End: 2021-01-14 | Stop reason: SDUPTHER

## 2020-08-24 ENCOUNTER — OFFICE VISIT (OUTPATIENT)
Dept: PHYSICAL MEDICINE AND REHAB | Facility: CLINIC | Age: 50
End: 2020-08-24
Payer: OTHER GOVERNMENT

## 2020-08-24 VITALS — WEIGHT: 224 LBS | HEIGHT: 72 IN | BODY MASS INDEX: 30.34 KG/M2

## 2020-08-24 DIAGNOSIS — M54.42 CHRONIC BILATERAL LOW BACK PAIN WITH LEFT-SIDED SCIATICA: ICD-10-CM

## 2020-08-24 DIAGNOSIS — G89.29 CHRONIC BILATERAL LOW BACK PAIN WITH LEFT-SIDED SCIATICA: ICD-10-CM

## 2020-08-24 DIAGNOSIS — M47.816 LUMBAR SPONDYLOSIS: ICD-10-CM

## 2020-08-24 PROCEDURE — 99499 UNLISTED E&M SERVICE: CPT | Mod: S$PBB,,, | Performed by: PHYSICAL MEDICINE & REHABILITATION

## 2020-08-24 PROCEDURE — 99499 NO LOS: ICD-10-PCS | Mod: S$PBB,,, | Performed by: PHYSICAL MEDICINE & REHABILITATION

## 2020-08-24 PROCEDURE — 95886 PR EMG COMPLETE, W/ NERVE CONDUCTION STUDIES, 5+ MUSCLES: ICD-10-PCS | Mod: 26,S$PBB,, | Performed by: PHYSICAL MEDICINE & REHABILITATION

## 2020-08-24 PROCEDURE — 95912 PR NERVE CONDUCTION STUDY; 11 -12 STUDIES: ICD-10-PCS | Mod: 26,S$PBB,, | Performed by: PHYSICAL MEDICINE & REHABILITATION

## 2020-08-24 PROCEDURE — 95912 NRV CNDJ TEST 11-12 STUDIES: CPT | Mod: 26,S$PBB,, | Performed by: PHYSICAL MEDICINE & REHABILITATION

## 2020-08-24 PROCEDURE — 99213 OFFICE O/P EST LOW 20 MIN: CPT | Mod: PBBFAC,PN,25 | Performed by: PHYSICAL MEDICINE & REHABILITATION

## 2020-08-24 PROCEDURE — 99999 PR PBB SHADOW E&M-EST. PATIENT-LVL III: CPT | Mod: PBBFAC,,, | Performed by: PHYSICAL MEDICINE & REHABILITATION

## 2020-08-24 PROCEDURE — 95912 NRV CNDJ TEST 11-12 STUDIES: CPT | Mod: PBBFAC,PN | Performed by: PHYSICAL MEDICINE & REHABILITATION

## 2020-08-24 PROCEDURE — 99999 PR PBB SHADOW E&M-EST. PATIENT-LVL III: ICD-10-PCS | Mod: PBBFAC,,, | Performed by: PHYSICAL MEDICINE & REHABILITATION

## 2020-08-24 PROCEDURE — 95886 MUSC TEST DONE W/N TEST COMP: CPT | Mod: 26,S$PBB,, | Performed by: PHYSICAL MEDICINE & REHABILITATION

## 2020-08-24 PROCEDURE — 95886 MUSC TEST DONE W/N TEST COMP: CPT | Mod: PBBFAC,PN | Performed by: PHYSICAL MEDICINE & REHABILITATION

## 2020-08-24 NOTE — LETTER
August 24, 2020      Bhavna Wild PA-C  [address] [facility] [address]  [address]  [facility] [address]           [facility]  [address] [place] [address]  Phone: [phone]  Fax: [phone]          Patient: Raciel White   MR Number: [id number]   YOB: 1970   Date of Visit: 8/24/2020       Dear Bhavna Wild:    Thank you for referring Raciel White to me for evaluation. Attached you will find relevant portions of my assessment and plan of care.    If you have questions, please do not hesitate to call me. I look forward to following Raciel White along with you.    Sincerely,    Matthew Conner MD    Enclosure  CC:  No Recipients    If you would like to receive this communication electronically, please contact [email or web address] or [phone] to request more information on [employer] access.    For providers and/or their staff who would like to refer a patient to [facility], please contact us through our one-stop-shop provider referral line, [facility] , at [phone].    If you feel you have received this communication in error or would no longer like to receive these types of communications, please e-mail [email or web address]

## 2020-08-24 NOTE — PROGRESS NOTES
[facility]  [address] [facility] [address]  [place] [address]             Full Name: Raciel Fields Gender: Male  Patient ID: [id number] YOB: 1970  History & Physical Exam: 49 y.o. male reports paresthesias and pain in bilateral arms (L>R) and bilateral legs (L>R) for the last 20+ years . DM negative.      Visit Date: 8/24/2020 08:10  Age: 49 Years 9 Months Old  Examining Physician: Dalila      Sensory NCS      Nerve / Sites Rec. Site Onset Lat Peak Lat NP Amp PP Amp Segments Distance Velocity     ms ms µV µV  cm m/s   L Median - Digit III (Antidromic)      Wrist Dig III 2.71 3.33 26.0 34.0 Wrist - Dig III 14 52   R Median - Digit III (Antidromic)      Wrist Dig III 2.97 3.54 21.0 45.3 Wrist - Dig III 14 47   L Ulnar - Digit V (Antidromic)      Wrist Dig V 2.66 3.23 17.9 29.4 Wrist - Dig V 14 53   R Ulnar - Digit V (Antidromic)      Wrist Dig V 2.81 3.49 16.2 25.4 Wrist - Dig V 14 50   R Sural - Ankle (Calf)      Calf Ankle 2.60 3.02 6.8 14.1 Calf - Ankle 14 54      2 Ankle 2.81 3.23 6.3 15.2      L Sural - Ankle (Calf)      Calf Ankle 3.18 3.75 5.1 17.1 Calf - Ankle 14 44      2 Ankle 2.92 3.44 8.2 19.6          Motor NCS      Nerve / Sites Muscle Latency Amplitude Amp % Duration Segments Distance Lat Diff Velocity     ms mV % ms  cm ms m/s   R Median - APB      Wrist APB 3.33 9.0 100 5.99 Wrist - APB 8        Elbow APB 7.76 8.6 95.2 6.04 Elbow - Wrist 24 4.43 54   L Median - APB      Wrist APB 3.23 13.0 100 6.93 Wrist - APB 8        Elbow APB 8.18 10.0 76.9 7.86 Elbow - Wrist 24 4.95 49   R Ulnar - ADM      Wrist ADM 2.71 9.3 100 6.30 Wrist - ADM 8        B.Elbow ADM 6.41 9.4 101 6.09 B.Elbow - Wrist 22 3.70 59      A.Elbow ADM 7.92 8.9 95.9 6.04 A.Elbow - B.Elbow 10 1.51 66   L Ulnar - ADM      Wrist ADM 3.07 11.2 100 5.68 Wrist - ADM 8        B.Elbow ADM 6.51 11.1 99.2 5.68 B.Elbow - Wrist 21 3.44 61      A.Elbow ADM 8.07 11.2 101 5.78 A.Elbow - B.Elbow 10 1.56 64   R Peroneal - EDB      Ankle  EDB 5.26 3.2 100 6.82 Ankle - EDB 8        Fib head EDB 11.93 3.1 98.2 7.66 Fib head - Ankle 31 6.67 47   L Peroneal - EDB      Ankle EDB 6.09 2.7 100 9.64 Ankle - EDB 8        Fib head EDB 14.32 2.6 98  Fib head - Ankle 33 8.23 40       EMG         EMG Summary Table     Spontaneous MUAP Recruitment   Muscle IA Fib PSW Fasc H.F. Amp Dur. PPP Pattern   L. Deltoid N None None None None N N N N       Summary    The motor conduction test was performed on 6 nerve(s). The results were normal in 5 nerve(s): R Median - APB, R Ulnar - ADM, L Ulnar - ADM, R Peroneal - EDB, L Peroneal - EDB. Results outside the specified normal range were found in 1 nerve(s), as follows:   In the L Median - APB study  o the take off velocity result was reduced for Elbow - Wrist segment    The sensory conduction test was performed on 6 nerve(s). The results were normal in 6 nerve(s): L Median - Digit III (Antidromic), R Median - Digit III (Antidromic), R Sural - Ankle (Calf), L Sural - Ankle (Calf), R Ulnar - Digit V (Antidromic), L Ulnar - Digit V (Antidromic).     The needle EMG study was normal in all 13 tested muscles: L. Deltoid, L. Biceps brachii, L. Triceps brachii, L. Pronator teres, L. First dorsal interosseous, L. Cervical paraspinals, L. Vastus medialis, L. Tibialis anterior, L. Peroneus longus, L. Gastrocnemius (Medial head), L. Gluteus medius, L. Gluteus romy, L. Lumbar paraspinals.    EMG         EMG Summary Table     Spontaneous MUAP Recruitment   Muscle IA Fib PSW Fasc H.F. Amp Dur. PPP Pattern   L. Deltoid N None None None None N N N N   L. Biceps brachii N None None None None N N N N   L. Triceps brachii N None None None None N N N N   L. Pronator teres N None None None None N N N N   L. First dorsal interosseous N None None None None N N N N   L. Cervical paraspinals N None None None None       L. Vastus medialis N None None None None N N N N   L. Tibialis anterior N None None None None N N N N   L. Peroneus longus N None  None None None N N N N   L. Gastrocnemius (Medial head) N None None None None N N N N   L. Gluteus medius N None None None None N N N N   L. Gluteus romy N None None None None       L. Lumbar paraspinals N None None None None           Summary      Electrodiagnostic Impression:  1. Normal electrodiagnostic study.  2. There was insufficient electrodiagnostic evidence for diagnoses of peripheral polyneuropathy, focal mononeuropathy, myopathy, or active axonal cervical radiculopathy/brachial plexopathy or lumbosacral radiculopathy/plexopathy of the left upper or lower extremities.    Plan:  Today's test results will be sent to his referring providers for further review and direction in his treatment.    Thank you very much for the referral. Please call if you have any questions regarding this study or the report.       -------------------------------  Matthew Conner M.D.

## 2020-08-28 ENCOUNTER — OFFICE VISIT (OUTPATIENT)
Dept: SPINE | Facility: CLINIC | Age: 50
End: 2020-08-28
Payer: OTHER GOVERNMENT

## 2020-08-28 VITALS
DIASTOLIC BLOOD PRESSURE: 86 MMHG | WEIGHT: 224 LBS | BODY MASS INDEX: 30.34 KG/M2 | SYSTOLIC BLOOD PRESSURE: 133 MMHG | HEIGHT: 72 IN | HEART RATE: 85 BPM

## 2020-08-28 DIAGNOSIS — G89.29 CHRONIC BILATERAL LOW BACK PAIN WITH LEFT-SIDED SCIATICA: ICD-10-CM

## 2020-08-28 DIAGNOSIS — M54.2 CERVICALGIA: ICD-10-CM

## 2020-08-28 DIAGNOSIS — M54.42 CHRONIC BILATERAL LOW BACK PAIN WITH LEFT-SIDED SCIATICA: ICD-10-CM

## 2020-08-28 DIAGNOSIS — Z98.890 H/O CERVICAL SPINE SURGERY: Primary | ICD-10-CM

## 2020-08-28 DIAGNOSIS — M47.816 LUMBAR SPONDYLOSIS: ICD-10-CM

## 2020-08-28 DIAGNOSIS — M47.812 CERVICAL SPONDYLOSIS: ICD-10-CM

## 2020-08-28 PROCEDURE — 99214 PR OFFICE/OUTPT VISIT, EST, LEVL IV, 30-39 MIN: ICD-10-PCS | Mod: S$PBB,,, | Performed by: PHYSICIAN ASSISTANT

## 2020-08-28 PROCEDURE — 99999 PR PBB SHADOW E&M-EST. PATIENT-LVL III: ICD-10-PCS | Mod: PBBFAC,,, | Performed by: PHYSICIAN ASSISTANT

## 2020-08-28 PROCEDURE — 99999 PR PBB SHADOW E&M-EST. PATIENT-LVL III: CPT | Mod: PBBFAC,,, | Performed by: PHYSICIAN ASSISTANT

## 2020-08-28 PROCEDURE — 99213 OFFICE O/P EST LOW 20 MIN: CPT | Mod: PBBFAC,PN | Performed by: PHYSICIAN ASSISTANT

## 2020-08-28 PROCEDURE — 99214 OFFICE O/P EST MOD 30 MIN: CPT | Mod: S$PBB,,, | Performed by: PHYSICIAN ASSISTANT
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
## 2020-09-08 ENCOUNTER — TELEPHONE (OUTPATIENT)
Dept: SPINE | Facility: CLINIC | Age: 50
End: 2020-09-08

## 2020-09-08 DIAGNOSIS — M54.42 CHRONIC BILATERAL LOW BACK PAIN WITH LEFT-SIDED SCIATICA: ICD-10-CM

## 2020-09-08 DIAGNOSIS — G89.29 CHRONIC BILATERAL LOW BACK PAIN WITH LEFT-SIDED SCIATICA: ICD-10-CM

## 2020-09-08 DIAGNOSIS — M47.816 LUMBAR SPONDYLOSIS: ICD-10-CM

## 2020-09-08 DIAGNOSIS — M47.812 CERVICAL SPONDYLOSIS: Primary | ICD-10-CM

## 2020-09-08 DIAGNOSIS — M54.12 CERVICAL RADICULOPATHY: ICD-10-CM

## 2020-09-08 NOTE — TELEPHONE ENCOUNTER
----- Message from Trino Cabello sent at 9/8/2020  4:26 PM CDT -----  Regarding: advice  Contact: self  Type: Needs Medical Advice  Who Called:  self   Symptoms (please be specific):    How long has patient had these symptoms:    Pharmacy name and phone #:    Best Call Back Number: [phone]  Additional Information: Patient requesting a referral to see pain management.

## 2020-09-09 NOTE — TELEPHONE ENCOUNTER
Spoke with patient and scheduled him an appointment with Dr. Romeo for 10-19-20, he indicated understanding.

## 2020-09-13 ENCOUNTER — PATIENT OUTREACH (OUTPATIENT)
Dept: ADMINISTRATIVE | Facility: OTHER | Age: 50
End: 2020-09-13

## 2020-09-14 ENCOUNTER — OFFICE VISIT (OUTPATIENT)
Dept: PAIN MEDICINE | Facility: CLINIC | Age: 50
End: 2020-09-14
Payer: OTHER GOVERNMENT

## 2020-09-14 VITALS
RESPIRATION RATE: 18 BRPM | BODY MASS INDEX: 29.63 KG/M2 | TEMPERATURE: 98 F | OXYGEN SATURATION: 99 % | SYSTOLIC BLOOD PRESSURE: 141 MMHG | WEIGHT: 218.5 LBS | DIASTOLIC BLOOD PRESSURE: 90 MMHG | HEART RATE: 87 BPM

## 2020-09-14 DIAGNOSIS — Z11.9 SCREENING EXAMINATION FOR INFECTIOUS DISEASE: ICD-10-CM

## 2020-09-14 DIAGNOSIS — M51.36 DDD (DEGENERATIVE DISC DISEASE), LUMBAR: ICD-10-CM

## 2020-09-14 DIAGNOSIS — M47.816 LUMBAR SPONDYLOSIS: Primary | ICD-10-CM

## 2020-09-14 PROCEDURE — 99213 PR OFFICE/OUTPT VISIT, EST, LEVL III, 20-29 MIN: ICD-10-PCS | Mod: S$PBB,,, | Performed by: PHYSICIAN ASSISTANT

## 2020-09-14 PROCEDURE — 96372 THER/PROPH/DIAG INJ SC/IM: CPT | Mod: PBBFAC,PN

## 2020-09-14 PROCEDURE — 99215 OFFICE O/P EST HI 40 MIN: CPT | Mod: PBBFAC,PN,25 | Performed by: PHYSICIAN ASSISTANT

## 2020-09-14 PROCEDURE — 99999 PR PBB SHADOW E&M-EST. PATIENT-LVL V: ICD-10-PCS | Mod: PBBFAC,,, | Performed by: PHYSICIAN ASSISTANT

## 2020-09-14 PROCEDURE — 99213 OFFICE O/P EST LOW 20 MIN: CPT | Mod: S$PBB,,, | Performed by: PHYSICIAN ASSISTANT

## 2020-09-14 PROCEDURE — 99999 PR PBB SHADOW E&M-EST. PATIENT-LVL V: CPT | Mod: PBBFAC,,, | Performed by: PHYSICIAN ASSISTANT

## 2020-09-14 RX ORDER — SODIUM CHLORIDE, SODIUM LACTATE, POTASSIUM CHLORIDE, CALCIUM CHLORIDE 600; 310; 30; 20 MG/100ML; MG/100ML; MG/100ML; MG/100ML
INJECTION, SOLUTION INTRAVENOUS CONTINUOUS
Status: CANCELLED | OUTPATIENT
Start: 2020-09-25

## 2020-09-14 RX ORDER — KETOROLAC TROMETHAMINE 30 MG/ML
60 INJECTION, SOLUTION INTRAMUSCULAR; INTRAVENOUS ONCE
Status: COMPLETED | OUTPATIENT
Start: 2020-09-14 | End: 2020-09-14

## 2020-09-14 RX ADMIN — KETOROLAC TROMETHAMINE 60 MG: 30 INJECTION, SOLUTION INTRAMUSCULAR; INTRAVENOUS at 10:09

## 2020-09-14 NOTE — PROGRESS NOTES
Health Maintenance Due   Topic Date Due    Influenza Vaccine (1) 08/01/2020     Updates were requested from care everywhere.  Chart was reviewed for overdue Proactive [facility] Encounters (RA) topics (CRS, Breast Cancer Screening, Eye exam)  Health Maintenance has been updated.  [facility] immunization registry triggered.  Immunizations were reconciled.

## 2020-09-17 NOTE — H&P (VIEW-ONLY)
This note was completed with dictation software and grammatical errors may exist.    CC:  Bilateral upper back and neck pain, low back pain and left leg pain    HPI:  The patient is a 49-year-old male with past medical history significant for C5/6 cervical spine surgery in 2008 who presents in referral from Bhavna Wild PA-C for left upper back pain.  He is status post L5/S1 interlaminar epidural steroid injection on 01/27/2020 with 100% relief of his leg pain.  However, the patient continues to have back pain.  He describes pain across the bilateral low back that is aching, worse with movement and also with lying down too long.  He does have improvement with Flexeril and lidocaine patches.  He also complains of bilateral neck pain radiating to the trapezius muscles but currently his back is much worse than his neck.  He denies weakness, numbness, bladder or bowel incontinence.    Previous history:  He reports having a very mild baseline degree of pain that has been present since his neck surgery in 2008 in the  but this was very tolerable until December 2018 when he developed severe pain in the left scapular region.  He does not have significant neck pain today but reports constant pain in the left scapular region.  This is significantly worse with looking to the left and looking up.  He describes it is grabbing, tight, also worse 1st things in the morning and improved with lying down.  He has been taking Flexeril without significant relief and reports drowsiness with this.  He has some numbness and weakness in the left arm and hand in an ulnar distribution.  He denies bladder or bowel incontinence.    Pain intervention history:  C5-6 artificial disc/flexible disc spacer in 2008.  He has taken Flexeril without significant relief and this causes drowsiness.  He has done physical therapy in the past over the years and has continued doing these exercises for both his neck and his back.  In about 2010, he  had undergone a lumbar injection which sounds like an epidural steroid injection and reports having almost 100% relief with this for one year.  He is status post L5/S1 VONNIE on 12/13/2019 with 70% relief of his left leg pain, 50% relief of his back pain.    ROS:  The patient reports left upper back pain.  Balance of review of systems is negative.    Past Medical History:   Diagnosis Date    GERD (gastroesophageal reflux disease)     Insomnia     Sleep apnea     cpap       Past Surgical History:   Procedure Laterality Date    COLONOSCOPY  2011    EPIDURAL STEROID INJECTION INTO LUMBAR SPINE N/A 12/13/2019    Procedure: Injection-steroid-epidural-lumbar;  Surgeon: Juan Carlos Romeo MD;  Location: [facility] OR;  Service: Pain Management;  Laterality: N/A;  L5/S1 L>R    EPIDURAL STEROID INJECTION INTO LUMBAR SPINE N/A 1/27/2020    Procedure: Injection-steroid-epidural-lumbar L5/S1;  Surgeon: Juan Carlos Romeo MD;  Location: [facility] OR;  Service: Pain Management;  Laterality: N/A;    LARYNGOSCOPY Bilateral 11/2/2018    Procedure: Sleep endoscopy;  Surgeon: Brandin Rosen MD;  Location: [facility] OR;  Service: ENT;  Laterality: Bilateral;    MYOTOMY AND SUSPENSION OF HYOID Bilateral 9/27/2018    Procedure: Hyoid suspension;  Surgeon: Brandin Rosen MD;  Location: [facility] OR;  Service: ENT;  Laterality: Bilateral;    SPINE SURGERY  2008    Neck fusion C6/7??       Social History     Socioeconomic History    Marital status:      Spouse name: Not on file    Number of children: Not on file    Years of education: Not on file    Highest education level: Not on file   Occupational History    Not on file   Social Needs    Financial resource strain: Not on file    Food insecurity     Worry: Not on file     Inability: Not on file    Transportation needs     Medical: Not on file     Non-medical: Not on file   Tobacco Use    Smoking status: Former Smoker     Packs/day: 0.50     Types: Vaping with nicotine     Smokeless tobacco: Former User   Substance and Sexual Activity    Alcohol use: Yes     Alcohol/week: 12.0 standard drinks     Types: 12 Cans of beer per week     Comment: weekly    Drug use: No    Sexual activity: Not on file   Lifestyle    Physical activity     Days per week: Not on file     Minutes per session: Not on file    Stress: Not on file   Relationships    Social connections     Talks on phone: Not on file     Gets together: Not on file     Attends Denominational service: Not on file     Active member of club or organization: Not on file     Attends meetings of clubs or organizations: Not on file     Relationship status: Not on file   Other Topics Concern    Not on file   Social History Narrative    Currently . 2 children. Work in [employer].          Medications/Allergies: See med card    Vitals:    09/14/20 0953   BP: (!) 141/90   Pulse: 87   Resp: 18   Temp: 98 °F (36.7 °C)   TempSrc: Oral   SpO2: 99%   Weight: 99.1 kg (218 lb 7.6 oz)   PainSc:   4   PainLoc: Back         Physical exam:  Gen: A and O x3, pleasant, well-groomed  Skin: No rashes or obvious lesions  HEENT: PERRLA, no obvious deformities on ears or in canals.Trachea midline.  CVS: Regular rate and rhythm, normal palpable pulses.  Resp: Clear to auscultation bilaterally, no wheezes or rales.  Abdomen: Soft, NT/ND.  Musculoskeletal: Able to heel walk, toe walk. No antalgic gait.     Neuro:  Upper extremities: 5/5 strength bilaterally   Reflexes: Brachioradialis 1+, Bicep 1+, Tricep 1+.   Sensory: Intact and symmetrical to light touch and pinprick in C2-T1 dermatomes bilaterally.    Cervical Spine:  Cervical spine: ROM is full in flexion, extension and lateral rotation with bilateral neck pain, worse with extension.  Spurling's maneuver causes no neck pain to either side.  Myofascial exam: No Tenderness to palpation across cervical paraspinous region bilaterally.  Mild mild tenderness to palpation to the bilateral trapezius  muscles.    Lumbar exam:  Range of motion is severely reduced with extension and oblique extension causing pain on the corresponding side.  Range of motion is moderately limited with flexion without increased pain.  Straight leg raise is negative bilaterally.  Michael's test negative bilaterally.  Internal and external rotation of the hips is negative bilaterally.  Myofascial exam:  Mild tenderness to palpation of the lumbar paraspinous muscles.      Imagin2019 CT cervical spine  Vertebral column: Redemonstrated are postsurgical changes of anterior interbody fusion of C5 and C6.  Fusion hardware does result in streak artifact.  There is no suspected loosening or infection.  The vertebral bodies maintain normal height and alignment.  The odontoid process is intact.  There is mild disc space narrowing at the C4-5 and C6-7 levels.  Spinal canal, cord, epidural space: The spinal canal is developmentally normal.  There is no obvious abnormal epidural mass or fluid collection.  Findings by level:  C1-2: Alignment is normal.  There is no significant joint space narrowing.  C2-3: There is no spinal canal or foraminal stenosis.  C3-4: There is a minimal disc bulge. There is mild bilateral uncovertebral spurring and mild facet joint arthropathy. There is no spinal stenosis. There may be mild right foraminal stenosis.  C4-5: There is no spinal canal or significant foraminal stenosis.  C5-6: There is bilateral uncovertebral spurring. There is a mild disc osteophyte. There is artifact related to fusion hardware. There is no significant spinal stenosis. There is mild-to-moderate right and mild left foraminal stenosis suspected.  C6-7: There is left greater than right uncovertebral spurring with mild facet joint arthropathy.  There is a mild disc osteophyte complex.  There is no spinal stenosis.  There is mild bilateral foraminal stenosis.  C7-T1: There is mild facet joint arthropathy.  There is no spinal canal or  significant foraminal stenosis.    11/26/19 MRI C-spine:  C2-C3: No significant central canal or neural foraminal narrowing.  C3-C4: Mild right uncovertebral spurring and bilateral facet arthropathy resulting and mild right-sided neural foraminal narrowing.  No significant spinal canal narrowing.  C4-C7 levels are obscured by beam hardening artifact.  C7-T1: No significant central canal or neural foraminal narrowing.    11/26/19 MRI L-spine:  Vertebrae: Mild concavity of the superior endplate of L1 with prominent Schmorl's node.  Hemangioma noted within the L5 vertebral body.  Degenerative endplate changes are noted at L5-S1.  Otherwise, vertebrae demonstrate normal marrow signal without fracture or destructive process.  Discs: Mild disc space narrowing at L5-S1 with disc desiccation.  Remaining discs appearance.  Cord: Visualized cord demonstrates normal signal.  Conus terminates at L2.  Degenerative findings:  T12-L4: No significant central canal or neural foraminal narrowing.  L4-5: Broad-based disc bulge with central annular fissure and disc protrusion and bilateral facet arthropathy, resulting in mild bilateral neural foraminal narrowing.  L5-S1: Broad-based disc bulge with left paracentral annular fissure and disc protrusion and bilateral facet arthropathy resulting in mild bilateral neural foraminal narrowing.    Assessment:  The patient is a 49-year-old male with past medical history significant for C5/6 cervical spine surgery in 2008 who presents in referral from Bhavna Wild PA-C for left upper back pain.    1. Lumbar spondylosis  Vital signs    Place 18-22 [facility] IV     Verify informed consent    Notify physician     Notify physician     Notify physician (specify)    Diet NPO    Case Request Operating Room: Block-nerve-medial branch-lumbar L3, L4, L5    Place in Outpatient    lactated ringers infusion   2. DDD (degenerative disc disease), lumbar     3. Screening examination for infectious  disease  COVID-19 Routine Screening       Plan:  1.  We discussed the patient had complete relief of his leg pain following the lumbar VONNIE earlier this year but continues to have back pain.  We discussed that his pain is likely due to facet arthropathy and I am going to schedule him for bilateral L3, 4 and 5 diagnostic medial branch nerve blocks corresponding to the bilateral L4/5 and L5/S1 facet joints.  If successful we will repeat the blocks prior to proceeding with radiofrequency ablation.  2.  We provided a Toradol injection today and discussed the risks involved.  3.  Follow-up in 4 weeks postprocedure or sooner as needed.

## 2020-09-17 NOTE — PROGRESS NOTES
This note was completed with dictation software and grammatical errors may exist.    CC:  Bilateral upper back and neck pain, low back pain and left leg pain    HPI:  The patient is a 49-year-old male with past medical history significant for C5/6 cervical spine surgery in 2008 who presents in referral from Bhavna Wild PA-C for left upper back pain.  He is status post L5/S1 interlaminar epidural steroid injection on 01/27/2020 with 100% relief of his leg pain.  However, the patient continues to have back pain.  He describes pain across the bilateral low back that is aching, worse with movement and also with lying down too long.  He does have improvement with Flexeril and lidocaine patches.  He also complains of bilateral neck pain radiating to the trapezius muscles but currently his back is much worse than his neck.  He denies weakness, numbness, bladder or bowel incontinence.    Previous history:  He reports having a very mild baseline degree of pain that has been present since his neck surgery in 2008 in the  but this was very tolerable until December 2018 when he developed severe pain in the left scapular region.  He does not have significant neck pain today but reports constant pain in the left scapular region.  This is significantly worse with looking to the left and looking up.  He describes it is grabbing, tight, also worse 1st things in the morning and improved with lying down.  He has been taking Flexeril without significant relief and reports drowsiness with this.  He has some numbness and weakness in the left arm and hand in an ulnar distribution.  He denies bladder or bowel incontinence.    Pain intervention history:  C5-6 artificial disc/flexible disc spacer in 2008.  He has taken Flexeril without significant relief and this causes drowsiness.  He has done physical therapy in the past over the years and has continued doing these exercises for both his neck and his back.  In about 2010, he  had undergone a lumbar injection which sounds like an epidural steroid injection and reports having almost 100% relief with this for one year.  He is status post L5/S1 VONNIE on 12/13/2019 with 70% relief of his left leg pain, 50% relief of his back pain.    ROS:  The patient reports left upper back pain.  Balance of review of systems is negative.    Past Medical History:   Diagnosis Date    GERD (gastroesophageal reflux disease)     Insomnia     Sleep apnea     cpap       Past Surgical History:   Procedure Laterality Date    COLONOSCOPY  2011    EPIDURAL STEROID INJECTION INTO LUMBAR SPINE N/A 12/13/2019    Procedure: Injection-steroid-epidural-lumbar;  Surgeon: Juan Carlos Romeo MD;  Location: [facility] OR;  Service: Pain Management;  Laterality: N/A;  L5/S1 L>R    EPIDURAL STEROID INJECTION INTO LUMBAR SPINE N/A 1/27/2020    Procedure: Injection-steroid-epidural-lumbar L5/S1;  Surgeon: Juan Carlos Romeo MD;  Location: [facility] OR;  Service: Pain Management;  Laterality: N/A;    LARYNGOSCOPY Bilateral 11/2/2018    Procedure: Sleep endoscopy;  Surgeon: Brandin Rosen MD;  Location: [facility] OR;  Service: ENT;  Laterality: Bilateral;    MYOTOMY AND SUSPENSION OF HYOID Bilateral 9/27/2018    Procedure: Hyoid suspension;  Surgeon: Brandin Rosen MD;  Location: [facility] OR;  Service: ENT;  Laterality: Bilateral;    SPINE SURGERY  2008    Neck fusion C6/7??       Social History     Socioeconomic History    Marital status:      Spouse name: Not on file    Number of children: Not on file    Years of education: Not on file    Highest education level: Not on file   Occupational History    Not on file   Social Needs    Financial resource strain: Not on file    Food insecurity     Worry: Not on file     Inability: Not on file    Transportation needs     Medical: Not on file     Non-medical: Not on file   Tobacco Use    Smoking status: Former Smoker     Packs/day: 0.50     Types: Vaping with nicotine     Smokeless tobacco: Former User   Substance and Sexual Activity    Alcohol use: Yes     Alcohol/week: 12.0 standard drinks     Types: 12 Cans of beer per week     Comment: weekly    Drug use: No    Sexual activity: Not on file   Lifestyle    Physical activity     Days per week: Not on file     Minutes per session: Not on file    Stress: Not on file   Relationships    Social connections     Talks on phone: Not on file     Gets together: Not on file     Attends Anabaptism service: Not on file     Active member of club or organization: Not on file     Attends meetings of clubs or organizations: Not on file     Relationship status: Not on file   Other Topics Concern    Not on file   Social History Narrative    Currently . 2 children. Work in [employer].          Medications/Allergies: See med card    Vitals:    09/14/20 0953   BP: (!) 141/90   Pulse: 87   Resp: 18   Temp: 98 °F (36.7 °C)   TempSrc: Oral   SpO2: 99%   Weight: 99.1 kg (218 lb 7.6 oz)   PainSc:   4   PainLoc: Back         Physical exam:  Gen: A and O x3, pleasant, well-groomed  Skin: No rashes or obvious lesions  HEENT: PERRLA, no obvious deformities on ears or in canals.Trachea midline.  CVS: Regular rate and rhythm, normal palpable pulses.  Resp: Clear to auscultation bilaterally, no wheezes or rales.  Abdomen: Soft, NT/ND.  Musculoskeletal: Able to heel walk, toe walk. No antalgic gait.     Neuro:  Upper extremities: 5/5 strength bilaterally   Reflexes: Brachioradialis 1+, Bicep 1+, Tricep 1+.   Sensory: Intact and symmetrical to light touch and pinprick in C2-T1 dermatomes bilaterally.    Cervical Spine:  Cervical spine: ROM is full in flexion, extension and lateral rotation with bilateral neck pain, worse with extension.  Spurling's maneuver causes no neck pain to either side.  Myofascial exam: No Tenderness to palpation across cervical paraspinous region bilaterally.  Mild mild tenderness to palpation to the bilateral trapezius  muscles.    Lumbar exam:  Range of motion is severely reduced with extension and oblique extension causing pain on the corresponding side.  Range of motion is moderately limited with flexion without increased pain.  Straight leg raise is negative bilaterally.  Michael's test negative bilaterally.  Internal and external rotation of the hips is negative bilaterally.  Myofascial exam:  Mild tenderness to palpation of the lumbar paraspinous muscles.      Imagin2019 CT cervical spine  Vertebral column: Redemonstrated are postsurgical changes of anterior interbody fusion of C5 and C6.  Fusion hardware does result in streak artifact.  There is no suspected loosening or infection.  The vertebral bodies maintain normal height and alignment.  The odontoid process is intact.  There is mild disc space narrowing at the C4-5 and C6-7 levels.  Spinal canal, cord, epidural space: The spinal canal is developmentally normal.  There is no obvious abnormal epidural mass or fluid collection.  Findings by level:  C1-2: Alignment is normal.  There is no significant joint space narrowing.  C2-3: There is no spinal canal or foraminal stenosis.  C3-4: There is a minimal disc bulge. There is mild bilateral uncovertebral spurring and mild facet joint arthropathy. There is no spinal stenosis. There may be mild right foraminal stenosis.  C4-5: There is no spinal canal or significant foraminal stenosis.  C5-6: There is bilateral uncovertebral spurring. There is a mild disc osteophyte. There is artifact related to fusion hardware. There is no significant spinal stenosis. There is mild-to-moderate right and mild left foraminal stenosis suspected.  C6-7: There is left greater than right uncovertebral spurring with mild facet joint arthropathy.  There is a mild disc osteophyte complex.  There is no spinal stenosis.  There is mild bilateral foraminal stenosis.  C7-T1: There is mild facet joint arthropathy.  There is no spinal canal or  significant foraminal stenosis.    11/26/19 MRI C-spine:  C2-C3: No significant central canal or neural foraminal narrowing.  C3-C4: Mild right uncovertebral spurring and bilateral facet arthropathy resulting and mild right-sided neural foraminal narrowing.  No significant spinal canal narrowing.  C4-C7 levels are obscured by beam hardening artifact.  C7-T1: No significant central canal or neural foraminal narrowing.    11/26/19 MRI L-spine:  Vertebrae: Mild concavity of the superior endplate of L1 with prominent Schmorl's node.  Hemangioma noted within the L5 vertebral body.  Degenerative endplate changes are noted at L5-S1.  Otherwise, vertebrae demonstrate normal marrow signal without fracture or destructive process.  Discs: Mild disc space narrowing at L5-S1 with disc desiccation.  Remaining discs appearance.  Cord: Visualized cord demonstrates normal signal.  Conus terminates at L2.  Degenerative findings:  T12-L4: No significant central canal or neural foraminal narrowing.  L4-5: Broad-based disc bulge with central annular fissure and disc protrusion and bilateral facet arthropathy, resulting in mild bilateral neural foraminal narrowing.  L5-S1: Broad-based disc bulge with left paracentral annular fissure and disc protrusion and bilateral facet arthropathy resulting in mild bilateral neural foraminal narrowing.    Assessment:  The patient is a 49-year-old male with past medical history significant for C5/6 cervical spine surgery in 2008 who presents in referral from Bhavna Wild PA-C for left upper back pain.    1. Lumbar spondylosis  Vital signs    Place 18-22 [facility] IV     Verify informed consent    Notify physician     Notify physician     Notify physician (specify)    Diet NPO    Case Request Operating Room: Block-nerve-medial branch-lumbar L3, L4, L5    Place in Outpatient    lactated ringers infusion   2. DDD (degenerative disc disease), lumbar     3. Screening examination for infectious  disease  COVID-19 Routine Screening       Plan:  1.  We discussed the patient had complete relief of his leg pain following the lumbar VONNIE earlier this year but continues to have back pain.  We discussed that his pain is likely due to facet arthropathy and I am going to schedule him for bilateral L3, 4 and 5 diagnostic medial branch nerve blocks corresponding to the bilateral L4/5 and L5/S1 facet joints.  If successful we will repeat the blocks prior to proceeding with radiofrequency ablation.  2.  We provided a Toradol injection today and discussed the risks involved.  3.  Follow-up in 4 weeks postprocedure or sooner as needed.

## 2020-09-22 ENCOUNTER — LAB VISIT (OUTPATIENT)
Dept: FAMILY MEDICINE | Facility: CLINIC | Age: 50
End: 2020-09-22
Payer: OTHER GOVERNMENT

## 2020-09-22 DIAGNOSIS — Z11.9 SCREENING EXAMINATION FOR INFECTIOUS DISEASE: ICD-10-CM

## 2020-09-22 PROCEDURE — U0003 INFECTIOUS AGENT DETECTION BY NUCLEIC ACID (DNA OR RNA); SEVERE ACUTE RESPIRATORY SYNDROME CORONAVIRUS 2 (SARS-COV-2) (CORONAVIRUS DISEASE [COVID-19]), AMPLIFIED PROBE TECHNIQUE, MAKING USE OF HIGH THROUGHPUT TECHNOLOGIES AS DESCRIBED BY CMS-2020-01-R: HCPCS

## 2020-09-24 LAB — SARS-COV-2 RNA RESP QL NAA+PROBE: NOT DETECTED

## 2020-09-25 ENCOUNTER — HOSPITAL ENCOUNTER (OUTPATIENT)
Facility: HOSPITAL | Age: 50
Discharge: HOME | End: 2020-09-25
Attending: ANESTHESIOLOGY | Admitting: ANESTHESIOLOGY
Payer: OTHER GOVERNMENT

## 2020-09-25 ENCOUNTER — HOSPITAL ENCOUNTER (OUTPATIENT)
Dept: RADIOLOGY | Facility: HOSPITAL | Age: 50
Discharge: HOME | End: 2020-09-25
Attending: ANESTHESIOLOGY
Payer: OTHER GOVERNMENT

## 2020-09-25 VITALS
TEMPERATURE: 98 F | HEIGHT: 72 IN | OXYGEN SATURATION: 100 % | WEIGHT: 223 LBS | RESPIRATION RATE: 17 BRPM | BODY MASS INDEX: 30.2 KG/M2 | HEART RATE: 74 BPM | DIASTOLIC BLOOD PRESSURE: 98 MMHG | SYSTOLIC BLOOD PRESSURE: 149 MMHG

## 2020-09-25 DIAGNOSIS — M54.16 LUMBAR RADICULOPATHY: ICD-10-CM

## 2020-09-25 DIAGNOSIS — M47.816 LUMBAR SPONDYLOSIS: Primary | ICD-10-CM

## 2020-09-25 PROCEDURE — 25500020 PHARM REV CODE 255: Mod: PO | Performed by: ANESTHESIOLOGY

## 2020-09-25 PROCEDURE — 64493 INJ PARAVERT F JNT L/S 1 LEV: CPT | Mod: 50,PO | Performed by: ANESTHESIOLOGY

## 2020-09-25 PROCEDURE — 64493 PR INJ DX/THER AGNT PARAVERT FACET JOINT,IMG GUIDE,LUMBAR/SAC,1ST LVL: ICD-10-PCS | Mod: 50,,, | Performed by: ANESTHESIOLOGY

## 2020-09-25 PROCEDURE — 99152 MOD SED SAME PHYS/QHP 5/>YRS: CPT | Mod: ,,, | Performed by: ANESTHESIOLOGY

## 2020-09-25 PROCEDURE — 76000 FLUOROSCOPY <1 HR PHYS/QHP: CPT | Mod: TC,PO

## 2020-09-25 PROCEDURE — 25000003 PHARM REV CODE 250: Mod: PO | Performed by: ANESTHESIOLOGY

## 2020-09-25 PROCEDURE — 64494 INJ PARAVERT F JNT L/S 2 LEV: CPT | Mod: 50,,, | Performed by: ANESTHESIOLOGY

## 2020-09-25 PROCEDURE — 64494 PR INJ DX/THER AGNT PARAVERT FACET JOINT,IMG GUIDE,LUMBAR/SAC, 2ND LEVEL: ICD-10-PCS | Mod: 50,,, | Performed by: ANESTHESIOLOGY

## 2020-09-25 PROCEDURE — 64494 INJ PARAVERT F JNT L/S 2 LEV: CPT | Mod: 50,PO | Performed by: ANESTHESIOLOGY

## 2020-09-25 PROCEDURE — 99152 PR MOD CONSCIOUS SEDATION, SAME PHYS, 5+ YRS, FIRST 15 MIN: ICD-10-PCS | Mod: ,,, | Performed by: ANESTHESIOLOGY

## 2020-09-25 PROCEDURE — 64493 INJ PARAVERT F JNT L/S 1 LEV: CPT | Mod: 50,,, | Performed by: ANESTHESIOLOGY

## 2020-09-25 PROCEDURE — 63600175 PHARM REV CODE 636 W HCPCS: Mod: PO | Performed by: ANESTHESIOLOGY

## 2020-09-25 RX ORDER — SODIUM CHLORIDE, SODIUM LACTATE, POTASSIUM CHLORIDE, CALCIUM CHLORIDE 600; 310; 30; 20 MG/100ML; MG/100ML; MG/100ML; MG/100ML
INJECTION, SOLUTION INTRAVENOUS CONTINUOUS
Status: DISCONTINUED | OUTPATIENT
Start: 2020-09-25 | End: 2020-09-25 | Stop reason: HOSPADM

## 2020-09-25 RX ORDER — MIDAZOLAM HYDROCHLORIDE 2 MG/2ML
INJECTION, SOLUTION INTRAMUSCULAR; INTRAVENOUS
Status: DISCONTINUED | OUTPATIENT
Start: 2020-09-25 | End: 2020-09-25 | Stop reason: HOSPADM

## 2020-09-25 RX ORDER — BUPIVACAINE HYDROCHLORIDE 2.5 MG/ML
INJECTION, SOLUTION EPIDURAL; INFILTRATION; INTRACAUDAL
Status: DISCONTINUED | OUTPATIENT
Start: 2020-09-25 | End: 2020-09-25 | Stop reason: HOSPADM

## 2020-09-25 RX ORDER — LIDOCAINE HYDROCHLORIDE 10 MG/ML
INJECTION, SOLUTION EPIDURAL; INFILTRATION; INTRACAUDAL; PERINEURAL
Status: DISCONTINUED | OUTPATIENT
Start: 2020-09-25 | End: 2020-09-25 | Stop reason: HOSPADM

## 2020-09-25 RX ADMIN — SODIUM CHLORIDE, SODIUM LACTATE, POTASSIUM CHLORIDE, AND CALCIUM CHLORIDE: .6; .31; .03; .02 INJECTION, SOLUTION INTRAVENOUS at 01:09

## 2020-09-25 NOTE — DISCHARGE INSTRUCTIONS
PAIN MANAGEMENT    Home care instructions   Apply ice pack to the injection site for 20 minute prior for the first 24 hours for soreness/discomfort at injection site   DO NOT USE HEAT FOR 24 HOURS   Keep site clean and dry for 24 hours, remove bandaid when desired   Do not drive until tomorrow  Take care when walking after a lumbar injection           BLOCKS  Resume regular activities today  Pain office will call in next 2 days      Resume Aspirin, Plavix, or Coumadin the day after the procedure unless other wise instructed  Resume home medication as prescribed today      CALL PHYSICIAN FOR:   Severe increase in your usual pain or appearance of new pain   Prolonged or increasing weakness or numbness in the legs or arms   Fever greater then 100 degrees F..   Drainage from the incision site, redness, active bleeding or increased swelling at the injection site   Headache that increases when your head is upright and decreases when you lie flat    FOR EMERGENCIES:   Go directly to Emergency Department for Shortness of breath, chest pain, or problems breathing

## 2020-09-25 NOTE — DISCHARGE SUMMARY
[facility]  Discharge Note  Short Stay    Admit Date: 9/25/2020    Discharge Date: 9/25/2020    Attending Physician: Juan Carlos Romeo MD     Discharge Provider: Juan Carlos Romeo    Diagnoses:  Active Hospital Problems    Diagnosis  POA    *Lumbar spondylosis [M47.816]  Yes      Resolved Hospital Problems   No resolved problems to display.       Discharged Condition: good    Final Diagnoses: Lumbar spondylosis [M47.816]    Disposition: Home or Self Care    Hospital Course: no complications, uneventful    Outcome of Hospitalization, Treatment, Procedure, or Surgery:  Patient was admitted for outpatient procedure. The patient underwent procedure without complications and are discharged home    Follow up/Patient Instructions:  Follow up as scheduled in Pain Management clinic in 3-4 weeks/Patient has received instructions and follow up date and time    Medications:  Continue previous medications    Discharge Procedure Orders   Call MD for:  temperature >100.4     Call MD for:  severe uncontrolled pain     Call MD for:  redness, tenderness, or signs of infection (pain, swelling, redness, odor or green/yellow discharge around incision site)     Call MD for:  severe persistent headache     No dressing needed         Discharge Procedure Orders (must include Diet, Follow-up, Activity):   Discharge Procedure Orders (must include Diet, Follow-up, Activity)   Call MD for:  temperature >100.4     Call MD for:  severe uncontrolled pain     Call MD for:  redness, tenderness, or signs of infection (pain, swelling, redness, odor or green/yellow discharge around incision site)     Call MD for:  severe persistent headache     No dressing needed

## 2020-09-25 NOTE — OP NOTE
PROCEDURE DATE: 9/25/2020    PROCEDURE:  Bilateral L3,4,5 medial branch nerve block     DIAGNOSIS:  Lumbar spondylosis    Post Op diagnosis: Same    PHYSICIAN: Juan Carlos Romeo MD    MEDICATIONS INJECTED: 0.25% bupivicaine, 1ml at each level    LOCAL ANESTHETIC USED: Lidocaine 1%, 2ml at each level    SEDATION MEDICATIONS:2mg versed    ESTIMATED BLOOD LOSS:  none    COMPLICATIONS:  none    TECHNIQUE: A time out was taken to identify the patient, procedure and side of the procedure. The patient was placed in a prone position, then prepped and draped in the usual sterile fashion using ChloraPrep and sterile towels.  The levels were determined under fluoroscopic guidance and then marked.  Local anesthetic was given by raising a wheal at the skin over each site and then infiltrated approximately 2cm deeper.  A 25-gauge 3.5 inch needle was introduced to the anatomic location of the right and then left L3,4,5 medial branch nerves on the bilateral side.  Appropriate location and medication spread confirmed by injecting 0.5ml of Omnipaque. The above medication was then injected. The patient tolerated the procedure well.     The patient was monitored after the procedure. The patient will be contacted in the next few days to determine extent of relief.  Patient was given post procedure and discharge instructions to follow at home.  The patient was discharged in a stable condition.    Event Time In   In Facility 1217   In Pre-Procedure 1239   Physician Available    Anesthesia Available    Pre-Op: Bedside Procedure Start    Pre-Op: Bedside Procedure Stop    Pre-Procedure Complete 1305   Out of Pre-Procedure    Anesthesia Start    Anesthesia Start Data Collection    Setup Start    Setup Complete    In Room 1323   Prep Start    Procedure Prep Complete    Procedure Start 1330   Procedure Closing    Emergence    Procedure Finish    Sedation Start 1323   Scope In    Extent Reached    Scope Out    Sedation End 1338   Out of Room 1338    Cleanup Start    Cleanup Complete    Cosmetic Start    Cosmetic Stop    Pain Mgmt In Room    Pain Mgmt Out Room    In Recovery    Anesthesia Finish    Bedside Procedure Start    Bedside Procedure Stop    Recovery Care Complete    Out of Recovery    To Phase II    In Phase II    Pain Mgmt Recovery Start    Pain Mgmt Recovery Stop    Obs Rec Start    Obs Rec Stop    Phase II Care Complete    Out of Phase II    Procedural Care Complete    Discharge    Pain Follow Up Needed    Pain Follow Up Complete      Moderate sedation was achieved with midazolam 2mg.  Continuous monitoring of EKG, blood pressure and pulse oximetry was provided by a registered nurse during the entire course of the procedure under my supervision and recorded in the patient's medical record.   Total time for sedation was 15 minutes.

## 2020-09-28 ENCOUNTER — TELEPHONE (OUTPATIENT)
Dept: PAIN MEDICINE | Facility: CLINIC | Age: 50
End: 2020-09-28

## 2020-09-28 DIAGNOSIS — Z11.9 SCREENING EXAMINATION FOR INFECTIOUS DISEASE: ICD-10-CM

## 2020-09-28 NOTE — TELEPHONE ENCOUNTER
Spoke with Raciel White on phone.  He is status post Bilateral L3,4,5 medial branch nerve block on 9/25/20.  He reports well over 70% pain relief for 6-8 hours after the procedure. Upon returning home the day of procedure, activities performed included yard work, moving furniture.  He would like to proceed with scheduling  2nd MBB .  We will call to schedule the procedure and discuss pre-procedure instructions.

## 2020-09-29 DIAGNOSIS — M47.816 LUMBAR SPONDYLOSIS: Primary | ICD-10-CM

## 2020-09-29 RX ORDER — SODIUM CHLORIDE, SODIUM LACTATE, POTASSIUM CHLORIDE, CALCIUM CHLORIDE 600; 310; 30; 20 MG/100ML; MG/100ML; MG/100ML; MG/100ML
INJECTION, SOLUTION INTRAVENOUS CONTINUOUS
Status: CANCELLED | OUTPATIENT
Start: 2020-10-09

## 2020-10-06 ENCOUNTER — LAB VISIT (OUTPATIENT)
Dept: FAMILY MEDICINE | Facility: CLINIC | Age: 50
End: 2020-10-06
Payer: OTHER GOVERNMENT

## 2020-10-06 DIAGNOSIS — Z11.9 SCREENING EXAMINATION FOR INFECTIOUS DISEASE: ICD-10-CM

## 2020-10-06 PROCEDURE — U0003 INFECTIOUS AGENT DETECTION BY NUCLEIC ACID (DNA OR RNA); SEVERE ACUTE RESPIRATORY SYNDROME CORONAVIRUS 2 (SARS-COV-2) (CORONAVIRUS DISEASE [COVID-19]), AMPLIFIED PROBE TECHNIQUE, MAKING USE OF HIGH THROUGHPUT TECHNOLOGIES AS DESCRIBED BY CMS-2020-01-R: HCPCS

## 2020-10-07 LAB — SARS-COV-2 RNA RESP QL NAA+PROBE: NOT DETECTED

## 2020-10-09 ENCOUNTER — TELEPHONE (OUTPATIENT)
Dept: PAIN MEDICINE | Facility: CLINIC | Age: 50
End: 2020-10-09

## 2020-10-09 DIAGNOSIS — Z11.9 SCREENING EXAMINATION FOR INFECTIOUS DISEASE: ICD-10-CM

## 2020-10-13 DIAGNOSIS — M54.2 CERVICALGIA: Primary | ICD-10-CM

## 2020-10-17 ENCOUNTER — LAB VISIT (OUTPATIENT)
Dept: FAMILY MEDICINE | Facility: CLINIC | Age: 50
End: 2020-10-17
Payer: OTHER GOVERNMENT

## 2020-10-17 DIAGNOSIS — Z11.9 SCREENING EXAMINATION FOR INFECTIOUS DISEASE: ICD-10-CM

## 2020-10-17 PROCEDURE — U0003 INFECTIOUS AGENT DETECTION BY NUCLEIC ACID (DNA OR RNA); SEVERE ACUTE RESPIRATORY SYNDROME CORONAVIRUS 2 (SARS-COV-2) (CORONAVIRUS DISEASE [COVID-19]), AMPLIFIED PROBE TECHNIQUE, MAKING USE OF HIGH THROUGHPUT TECHNOLOGIES AS DESCRIBED BY CMS-2020-01-R: HCPCS

## 2020-10-18 LAB — SARS-COV-2 RNA RESP QL NAA+PROBE: NOT DETECTED

## 2020-10-19 ENCOUNTER — PATIENT MESSAGE (OUTPATIENT)
Dept: SURGERY | Facility: HOSPITAL | Age: 50
End: 2020-10-19

## 2020-10-19 ENCOUNTER — TELEPHONE (OUTPATIENT)
Dept: PAIN MEDICINE | Facility: CLINIC | Age: 50
End: 2020-10-19

## 2020-10-19 NOTE — TELEPHONE ENCOUNTER
Patient stated he is having sciatic nerve pain and wanted to know can an injection for that be combined with tomorrow's procedure.

## 2020-10-19 NOTE — TELEPHONE ENCOUNTER
----- Message from Iwona Aguilar sent at 10/19/2020  8:44 AM CDT -----  Type: Needs Medical Advice    Who Called:  Patient  Best Call Back Number: [phone]  Additional Information:  Patient requesting to speak with nurse concerning tomorrow's procedure/has question/please call patient back to advise.

## 2020-10-19 NOTE — TELEPHONE ENCOUNTER
The patient know that the procedure that we are doing tomorrow is only for his back and not his leg.  We would have to switch to an epidural steroid injection, we cannot do both at the same time.  We could try to get approval for the lumbar VONNIE instead but it might be worth going through with the medial branch block to hopefully start relieving his back pain.  It depends on how bad his leg pain is at this time.  Please call him in find out

## 2020-10-20 ENCOUNTER — HOSPITAL ENCOUNTER (OUTPATIENT)
Dept: RADIOLOGY | Facility: HOSPITAL | Age: 50
Discharge: HOME | End: 2020-10-20
Attending: ANESTHESIOLOGY
Payer: OTHER GOVERNMENT

## 2020-10-20 ENCOUNTER — HOSPITAL ENCOUNTER (OUTPATIENT)
Facility: HOSPITAL | Age: 50
Discharge: HOME | End: 2020-10-20
Attending: ANESTHESIOLOGY | Admitting: ANESTHESIOLOGY
Payer: OTHER GOVERNMENT

## 2020-10-20 VITALS
HEIGHT: 72 IN | SYSTOLIC BLOOD PRESSURE: 128 MMHG | HEART RATE: 73 BPM | WEIGHT: 215 LBS | DIASTOLIC BLOOD PRESSURE: 79 MMHG | TEMPERATURE: 98 F | OXYGEN SATURATION: 97 % | RESPIRATION RATE: 16 BRPM | BODY MASS INDEX: 29.12 KG/M2

## 2020-10-20 DIAGNOSIS — M47.816 LUMBAR SPONDYLOSIS: Primary | ICD-10-CM

## 2020-10-20 DIAGNOSIS — M54.16 LUMBAR RADICULOPATHY: ICD-10-CM

## 2020-10-20 PROCEDURE — 64494 INJ PARAVERT F JNT L/S 2 LEV: CPT | Mod: 50,PO | Performed by: ANESTHESIOLOGY

## 2020-10-20 PROCEDURE — 64494 INJ PARAVERT F JNT L/S 2 LEV: CPT | Mod: 50,,, | Performed by: ANESTHESIOLOGY

## 2020-10-20 PROCEDURE — 76000 FLUOROSCOPY <1 HR PHYS/QHP: CPT | Mod: TC,PO

## 2020-10-20 PROCEDURE — 64493 INJ PARAVERT F JNT L/S 1 LEV: CPT | Mod: 50,PO | Performed by: ANESTHESIOLOGY

## 2020-10-20 PROCEDURE — 64493 INJ PARAVERT F JNT L/S 1 LEV: CPT | Mod: 50,,, | Performed by: ANESTHESIOLOGY

## 2020-10-20 PROCEDURE — 64494 PR INJ DX/THER AGNT PARAVERT FACET JOINT,IMG GUIDE,LUMBAR/SAC, 2ND LEVEL: ICD-10-PCS | Mod: 50,,, | Performed by: ANESTHESIOLOGY

## 2020-10-20 PROCEDURE — 63600175 PHARM REV CODE 636 W HCPCS: Mod: PO | Performed by: ANESTHESIOLOGY

## 2020-10-20 PROCEDURE — 64493 PR INJ DX/THER AGNT PARAVERT FACET JOINT,IMG GUIDE,LUMBAR/SAC,1ST LVL: ICD-10-PCS | Mod: 50,,, | Performed by: ANESTHESIOLOGY

## 2020-10-20 PROCEDURE — 25000003 PHARM REV CODE 250: Mod: PO | Performed by: ANESTHESIOLOGY

## 2020-10-20 RX ORDER — LIDOCAINE HYDROCHLORIDE 10 MG/ML
1 INJECTION INFILTRATION; PERINEURAL ONCE
Status: COMPLETED | OUTPATIENT
Start: 2020-10-20 | End: 2020-10-20

## 2020-10-20 RX ORDER — SODIUM CHLORIDE, SODIUM LACTATE, POTASSIUM CHLORIDE, CALCIUM CHLORIDE 600; 310; 30; 20 MG/100ML; MG/100ML; MG/100ML; MG/100ML
INJECTION, SOLUTION INTRAVENOUS CONTINUOUS
Status: DISCONTINUED | OUTPATIENT
Start: 2020-10-20 | End: 2020-10-20 | Stop reason: HOSPADM

## 2020-10-20 RX ORDER — MIDAZOLAM HYDROCHLORIDE 2 MG/2ML
INJECTION, SOLUTION INTRAMUSCULAR; INTRAVENOUS
Status: DISCONTINUED | OUTPATIENT
Start: 2020-10-20 | End: 2020-10-20 | Stop reason: HOSPADM

## 2020-10-20 RX ADMIN — LIDOCAINE HYDROCHLORIDE 1 ML: 10 INJECTION, SOLUTION EPIDURAL; INFILTRATION; INTRACAUDAL; PERINEURAL at 01:10

## 2020-10-20 RX ADMIN — SODIUM CHLORIDE, SODIUM LACTATE, POTASSIUM CHLORIDE, AND CALCIUM CHLORIDE: .6; .31; .03; .02 INJECTION, SOLUTION INTRAVENOUS at 01:10

## 2020-10-20 NOTE — DISCHARGE SUMMARY
[facility]  Discharge Note  Short Stay    [facility]  Discharge Note  Short Stay    Admit Date: 10/20/2020    Discharge Date: 10/20/2020    Attending Physician: Juan Carlos Romeo MD     Discharge Provider: Juan Carlos Romeo    Diagnoses:  Active Hospital Problems    Diagnosis  POA    *Lumbar spondylosis [M47.816]  Yes      Resolved Hospital Problems   No resolved problems to display.       Discharged Condition: good    Final Diagnoses: Lumbar spondylosis [M47.816]    Disposition: Home or Self Care    Hospital Course: no complications, uneventful    Outcome of Hospitalization, Treatment, Procedure, or Surgery:  Patient was admitted for outpatient procedure. The patient underwent procedure without complications and are discharged home    Follow up/Patient Instructions:  Follow up as scheduled in Pain Management clinic in 3-4 weeks/Patient has received instructions and follow up date and time    Medications:  Continue previous medications    Discharge Procedure Orders   Call MD for:  temperature >100.4     Call MD for:  severe uncontrolled pain     Call MD for:  redness, tenderness, or signs of infection (pain, swelling, redness, odor or green/yellow discharge around incision site)     Call MD for:  severe persistent headache     No dressing needed         Discharge Procedure Orders (must include Diet, Follow-up, Activity):   Discharge Procedure Orders (must include Diet, Follow-up, Activity)   Call MD for:  temperature >100.4     Call MD for:  severe uncontrolled pain     Call MD for:  redness, tenderness, or signs of infection (pain, swelling, redness, odor or green/yellow discharge around incision site)     Call MD for:  severe persistent headache     No dressing needed

## 2020-10-20 NOTE — H&P
CC: Back pain    HPI: The patient is a 50yo man with a history of lumbar spondylosis here for a second L3,4,5 medial branch block. There are no major changes in history and physical from 9/14/20.    Past Medical History:   Diagnosis Date    Arthritis     GERD (gastroesophageal reflux disease)     Insomnia     Sleep apnea     cpap       Past Surgical History:   Procedure Laterality Date    COLONOSCOPY  2011    EPIDURAL STEROID INJECTION INTO LUMBAR SPINE N/A 12/13/2019    Procedure: Injection-steroid-epidural-lumbar;  Surgeon: Juan Carlos Romeo MD;  Location: [facility] OR;  Service: Pain Management;  Laterality: N/A;  L5/S1 L>R    EPIDURAL STEROID INJECTION INTO LUMBAR SPINE N/A 1/27/2020    Procedure: Injection-steroid-epidural-lumbar L5/S1;  Surgeon: Juan Carlos Romeo MD;  Location: [facility] OR;  Service: Pain Management;  Laterality: N/A;    INJECTION OF ANESTHETIC AGENT AROUND MEDIAL BRANCH NERVES INNERVATING LUMBAR FACET JOINT Bilateral 9/25/2020    Procedure: Block-nerve-medial branch-lumbar L3, L4, L5;  Surgeon: Juan Carlos Romeo MD;  Location: [facility] OR;  Service: Pain Management;  Laterality: Bilateral;    LARYNGOSCOPY Bilateral 11/2/2018    Procedure: Sleep endoscopy;  Surgeon: Brandin Rosen MD;  Location: [facility] OR;  Service: ENT;  Laterality: Bilateral;    MYOTOMY AND SUSPENSION OF HYOID Bilateral 9/27/2018    Procedure: Hyoid suspension;  Surgeon: Brandin Rosen MD;  Location: [facility] OR;  Service: ENT;  Laterality: Bilateral;    SPINE SURGERY  2008    Neck fusion C6/7??       Family History   Problem Relation Age of Onset    Hypertension Paternal Grandfather     Diabetes Neg Hx     Cancer Neg Hx     Glaucoma Neg Hx     Macular degeneration Neg Hx     Retinal detachment Neg Hx        Social History     Socioeconomic History    Marital status:      Spouse name: Not on file    Number of children: Not on file    Years of education: Not on file    Highest education level: Not on file    Occupational History    Not on file   Social Needs    Financial resource strain: Not on file    Food insecurity     Worry: Not on file     Inability: Not on file    Transportation needs     Medical: Not on file     Non-medical: Not on file   Tobacco Use    Smoking status: Former Smoker     Packs/day: 0.50     Types: Vaping with nicotine    Smokeless tobacco: Former User   Substance and Sexual Activity    Alcohol use: Yes     Alcohol/week: 12.0 standard drinks     Types: 12 Cans of beer per week     Comment: weekly    Drug use: No    Sexual activity: Not on file   Lifestyle    Physical activity     Days per week: Not on file     Minutes per session: Not on file    Stress: Not on file   Relationships    Social connections     Talks on phone: Not on file     Gets together: Not on file     Attends Denominational service: Not on file     Active member of club or organization: Not on file     Attends meetings of clubs or organizations: Not on file     Relationship status: Not on file   Other Topics Concern    Not on file   Social History Narrative    Currently . 2 children. Work in [employer].        No current facility-administered medications for this encounter.      Facility-Administered Medications Ordered in Other Encounters   Medication Dose Route Frequency Provider Last Rate Last Dose    lactated ringers infusion   Intravenous Continuous Jaime Valdez MD   Stopped at 11/02/18 0906       Review of patient's allergies indicates:  No Known Allergies    Vitals:    10/20/20 1323 10/20/20 1325   BP: 139/81 139/81   Pulse: 70    Resp: 18    Temp: 97.9 °F (36.6 °C)    TempSrc: Skin    SpO2: 99%    Weight: 97.5 kg (215 lb)    Height: 6' (1.829 m)         ASA 2, mallampati 2      REVIEW OF SYSTEMS:     GENERAL: No weight loss, malaise or fevers.  HEENT:  No recent changes in vision or hearing  NECK: Negative for lumps, no difficulty with swallowing.  RESPIRATORY: Negative for cough, wheezing or  shortness of breath, patient denies any recent URI.  CARDIOVASCULAR: Negative for chest pain, leg swelling or palpitations.  GI: Negative for abdominal discomfort, blood in stools or black stools or change in bowel habits.  MUSCULOSKELETAL: See HPI.  SKIN: Negative for lesions, rash, and itching.  PSYCH: No suicidal or homicidal ideations, no current mood disturbances.  HEMATOLOGY/LYMPHOLOGY: Negative for prolonged bleeding, bruising easily or swollen nodes. Patient is not currently taking any anti-coagulants  ENDO: No history of diabetes or thyroid dysfunction  NEURO: No history of syncope, paralysis, seizures or tremors.All other reviewed and negative other than HPI.    Physical exam:  Gen: A and O x3, pleasant, well-groomed  Skin: No rashes or obvious lesions  HEENT: PERRLA, no obvious deformities on ears or in canals. No thyroid masses, trachea midline, no palpable lymph nodes in neck, axilla.  CVS: Regular rate and rhythm, normal S1 and S2, no murmurs.  Resp: Clear to auscultation bilaterally.  Abdomen: Soft, NT/ND, normal bowel sounds present.  Musculoskeletal/Neuro: Moving all extremities    Assessment:  Lumbar spondylosis  -     Case Request Operating Room: Block-nerve-medial branch-lumbar, L3, 4, 5  -     Place in Outpatient; Standing  -     Diet NPO; Standing  -     lactated ringers infusion  -     Notify physician ; Standing  -     Notify physician ; Standing  -     Notify physician (specify); Standing  -     Place 18-22 gaua peripheral IV ; Standing  -     Verify informed consent; Standing  -     Vital signs; Standing    Other orders  -     Progressive Mobility Protocol (mobilize patient to their highest level of functioning at least twice daily); Standing  -     IP VTE LOW RISK PATIENT; Standing  -     lidocaine HCL 10 mg/ml (1%) injection 1 mL

## 2020-10-20 NOTE — OP NOTE
PROCEDURE DATE: 10/20/2020    PROCEDURE:  Second bilateral L3,4,5 medial branch nerve block     DIAGNOSIS:  Lumbar spondylosis    Post Op diagnosis: Same    PHYSICIAN: Juan Carlos Romeo MD    MEDICATIONS INJECTED: 0.25% bupivicaine, 1ml at each level    LOCAL ANESTHETIC USED: Lidocaine 1%, 2ml at each level    SEDATION MEDICATIONS:2mg versed    ESTIMATED BLOOD LOSS:  none    COMPLICATIONS:  none    TECHNIQUE: A time out was taken to identify the patient, procedure and side of the procedure. The patient was placed in a prone position, then prepped and draped in the usual sterile fashion using ChloraPrep and sterile towels.  The levels were determined under fluoroscopic guidance and then marked.  Local anesthetic was given by raising a wheal at the skin over each site and then infiltrated approximately 2cm deeper.  A 25-gauge 3.5 inch needle was introduced to the anatomic location of the right and then left L3,4,5 medial branch nerves on the bilateral side.  Appropriate location and medication spread confirmed by injecting 0.5ml of Omnipaque. The above medication was then injected. The patient tolerated the procedure well.     The patient was monitored after the procedure. The patient will be contacted in the next few days to determine extent of relief.  Patient was given post procedure and discharge instructions to follow at home.  The patient was discharged in a stable condition.

## 2020-10-20 NOTE — TELEPHONE ENCOUNTER
Spoke with patient regarding this. He would like to proceed with the block and address the leg pain at a later time.

## 2020-10-21 ENCOUNTER — TELEPHONE (OUTPATIENT)
Dept: PAIN MEDICINE | Facility: CLINIC | Age: 50
End: 2020-10-21

## 2020-10-21 DIAGNOSIS — Z11.9 SCREENING EXAMINATION FOR INFECTIOUS DISEASE: ICD-10-CM

## 2020-10-22 DIAGNOSIS — M47.816 LUMBAR SPONDYLOSIS: Primary | ICD-10-CM

## 2020-10-22 RX ORDER — SODIUM CHLORIDE, SODIUM LACTATE, POTASSIUM CHLORIDE, CALCIUM CHLORIDE 600; 310; 30; 20 MG/100ML; MG/100ML; MG/100ML; MG/100ML
INJECTION, SOLUTION INTRAVENOUS CONTINUOUS
Status: CANCELLED | OUTPATIENT
Start: 2020-10-22

## 2020-10-22 NOTE — TELEPHONE ENCOUNTER
The patient states that he has tried gabapentin a few years ago and he said that he was 'dicombobulated' and his equilibrium was effected, and he was disoriented. He has also tried lyrica and he reports no relief from that.

## 2020-10-22 NOTE — TELEPHONE ENCOUNTER
Please find out if he has tried gabapentin before.  This would be the ideal medication for sciatic nerve pain.  If he has not taken it, let me know and I will send in a prescription.  If he has taken it before without relief let me know.

## 2020-10-22 NOTE — TELEPHONE ENCOUNTER
----- Message from dAriane Flores RN sent at 10/22/2020 10:20 AM CDT -----  Regarding: MERRILL

## 2020-10-22 NOTE — TELEPHONE ENCOUNTER
Spoke with patient. Patient is post bilateral MBB L3, 4, 5 performed on 10/20 by Dr. Romeo. Patient states he got 100% pain relief from the procedure for about 5 hours. Activities performed included moving heavy things around his house. Scheduled patient for ablation on 11/6 and COVID test on 11/3. Patient is also scheduled for a follow up visit with RANI Galvan on 11/25 at 9:40am. Patient states he would like pain medication for his sciatic pain and says the Flexeril is not as effective as he would like. I told the patient I would send this information to Dr. Romeo.

## 2020-10-23 RX ORDER — HYDROCODONE BITARTRATE AND ACETAMINOPHEN 5; 325 MG/1; MG/1
1 TABLET ORAL EVERY 6 HOURS PRN
Qty: 20 TABLET | Refills: 0 | Status: SHIPPED | OUTPATIENT
Start: 2020-10-23 | End: 2021-01-14

## 2020-10-30 ENCOUNTER — TELEPHONE (OUTPATIENT)
Dept: PAIN MEDICINE | Facility: CLINIC | Age: 50
End: 2020-10-30

## 2020-10-30 NOTE — TELEPHONE ENCOUNTER
Spoke with patient about switching providers to a closer location. Message sent Lazaro Iyer at [facility].

## 2020-10-30 NOTE — TELEPHONE ENCOUNTER
----- Message from Yordan Rowe sent at 10/30/2020 11:27 AM CDT -----  Regarding: Reschedule procedure for  11-13-20  Type: Needs Medical Advice    Who Called:  Ptnt  [phone]    Additional Information:     Advised needs to reschedule his procedure nearer to his new home address in [place], [place].   Please advise.

## 2020-11-25 ENCOUNTER — TELEPHONE (OUTPATIENT)
Dept: PAIN MEDICINE | Facility: CLINIC | Age: 50
End: 2020-11-25

## 2020-11-25 NOTE — TELEPHONE ENCOUNTER
----- Message from Jaime Sandoval sent at 11/25/2020 10:01 AM CST -----  Regarding: pt  Type: Needs Medical Advice    Who Called:  pt    Best Call Back Number: [phone]  Additional Information: pt is ready to have procedure. Please contact to schedule.

## 2020-11-25 NOTE — TELEPHONE ENCOUNTER
----- Message from Joanne Zelaya sent at 11/25/2020 11:20 AM CST -----  Type: Needs Medical Advice  Who Called:  Patient  Best Call Back Number:   Additional Information: Calling to request that his referral be faxed to Dr Iyer's office at .

## 2020-12-01 ENCOUNTER — TELEPHONE (OUTPATIENT)
Dept: PAIN MEDICINE | Facility: CLINIC | Age: 50
End: 2020-12-01

## 2020-12-01 DIAGNOSIS — M54.2 CERVICALGIA: Primary | ICD-10-CM

## 2020-12-01 NOTE — TELEPHONE ENCOUNTER
----- Message from Amber Angel sent at 12/1/2020 11:28 AM CST -----  Regarding: Order  Contact: pt  Type:  Patient Returning Call    Who Called:  pt  Does the patient know what this is regarding?:  please follow up regarding referral requested   Best Call Back Number:    Additional Information:  Thank you

## 2020-12-07 ENCOUNTER — TELEPHONE (OUTPATIENT)
Dept: FAMILY MEDICINE | Facility: CLINIC | Age: 50
End: 2020-12-07

## 2020-12-07 NOTE — TELEPHONE ENCOUNTER
----- Message from Clemencia Dallas sent at 12/4/2020  8:30 AM CST -----  Meaghan with [facility]     She is calling because patient has  prime and it requires an authorization number.  Please call her at [phone].  Her fax number is [phone]. Thank you!

## 2020-12-14 ENCOUNTER — TELEPHONE (OUTPATIENT)
Dept: FAMILY MEDICINE | Facility: CLINIC | Age: 50
End: 2020-12-14

## 2020-12-14 ENCOUNTER — OFFICE VISIT (OUTPATIENT)
Dept: DERMATOLOGY | Facility: CLINIC | Age: 50
End: 2020-12-14
Payer: OTHER GOVERNMENT

## 2020-12-14 DIAGNOSIS — D18.01 CHERRY ANGIOMA: ICD-10-CM

## 2020-12-14 DIAGNOSIS — L82.1 SEBORRHEIC KERATOSIS: Primary | ICD-10-CM

## 2020-12-14 DIAGNOSIS — L98.9 SKIN LESION: ICD-10-CM

## 2020-12-14 DIAGNOSIS — L57.8 DIFFUSE PHOTODAMAGE OF SKIN: ICD-10-CM

## 2020-12-14 PROCEDURE — 99202 OFFICE O/P NEW SF 15 MIN: CPT | Mod: S$PBB,,, | Performed by: DERMATOLOGY

## 2020-12-14 PROCEDURE — 99202 PR OFFICE/OUTPT VISIT, NEW, LEVL II, 15-29 MIN: ICD-10-PCS | Mod: S$PBB,,, | Performed by: DERMATOLOGY

## 2020-12-14 PROCEDURE — 99999 PR PBB SHADOW E&M-EST. PATIENT-LVL II: CPT | Mod: PBBFAC,,,

## 2020-12-14 PROCEDURE — 99212 OFFICE O/P EST SF 10 MIN: CPT | Mod: PBBFAC

## 2020-12-14 PROCEDURE — 99999 PR PBB SHADOW E&M-EST. PATIENT-LVL II: ICD-10-PCS | Mod: PBBFAC,,,

## 2020-12-14 NOTE — PROGRESS NOTES
Subjective:       Patient ID:  Raciel White is a 50 y.o. male who presents for   Chief Complaint   Patient presents with    Lesion     forehead, face     HPI  51 y/o WM with no PMH of skin cancer presents for initial evaluation of above CC. Pt reports a lesion on his frontal scalp that has been present for about a year, slowly enlarging. Not painful, itchy, or changing in color. Also reports a few skin colored lesions on his temples. Pt has not received a skin exam in several years. Non compliant with daily SPF and sunprotective clothing. No other skin complaints.   Wife was recently diagnosed with a precancerous skin condition.  Pt declines any lesions on legs or feet today.    Review of Systems   Constitutional: Negative.    HENT: Negative.    Eyes: Negative.    Musculoskeletal: Negative.    Skin: Negative for itching, rash, dry skin, daily sunscreen use, activity-related sunscreen use, dry lips and wears hat.        Objective:    Physical Exam   Constitutional: He appears well-developed and well-nourished.   Neurological: He is alert and oriented to person, place, and time.   Skin:   Areas Examined (abnormalities noted in diagram):   Scalp / Hair Palpated and Inspected  Head / Face Inspection Performed  Neck Inspection Performed  Chest / Axilla Inspection Performed  Abdomen Inspection Performed  Back Inspection Performed  RUE Inspected  LUE Inspection Performed  Nails and Digits Inspection Performed              Diagram Legend     Erythematous scaling macule/papule c/w actinic keratosis       Vascular papule c/w angioma      Pigmented verrucoid papule/plaque c/w seborrheic keratosis      Yellow umbilicated papule c/w sebaceous hyperplasia      Irregularly shaped tan macule c/w lentigo     1-2 mm smooth white papules consistent with Milia      Movable subcutaneous cyst with punctum c/w epidermal inclusion cyst      Subcutaneous movable cyst c/w pilar cyst      Firm pink to brown papule c/w dermatofibroma       Pedunculated fleshy papule(s) c/w skin tag(s)      Evenly pigmented macule c/w junctional nevus     Mildly variegated pigmented, slightly irregular-bordered macule c/w mildly atypical nevus      Flesh colored to evenly pigmented papule c/w intradermal nevus       Pink pearly papule/plaque c/w basal cell carcinoma      Erythematous hyperkeratotic cursted plaque c/w SCC      Surgical scar with no sign of skin cancer recurrence      Open and closed comedones      Inflammatory papules and pustules      Verrucoid papule consistent consistent with wart     Erythematous eczematous patches and plaques     Dystrophic onycholytic nail with subungual debris c/w onychomycosis     Umbilicated papule    Erythematous-base heme-crusted tan verrucoid plaque consistent with inflamed seborrheic keratosis     Erythematous Silvery Scaling Plaque c/w Psoriasis     See annotation      Assessment / Plan:        Seborrheic keratosis  -frontal scalp  -provided reassurance of benign nature of lesion    Cherry angioma  -R eyelid  -no intervention needed at this time, pt understands benign nature of lesion     Skin cancer screening  -no concerning lesions on exam  -discussed sun protective measures, given sunscreen samples  -discussed signs of skin cancer, RTC if new or changing lesions develop  -yearly skin exams    Diffuse Photodamage  Patient instructed in importance in daily sun protection of at least spf 30. Sun avoidance and topical protection discussed.     Recommend elta MD 46 for daily use on face and neck.    Patient encouraged to wear hat for all outdoor exposure.     Also discussed sun protective clothing.      RTC 1 year

## 2020-12-14 NOTE — PATIENT INSTRUCTIONS
Preventing Skin Cancer     Use sunscreen of SPF 30 or greater. Apply liberally.   Relaxing in the sun may feel good, but it isnt good for your skin. In fact, being exposed to the suns harmful rays is a major cause of skin cancer. This is a serious disease that can be life-threatening. People of all ages and backgrounds are at risk. But in most cases, skin cancer can be prevented.  Your role in prevention  You can act today to help prevent skin cancer. Start by avoiding the suns UV (ultraviolet) rays. And dont use tanning beds. These are no safer than the sun. Taking these steps can help keep you from getting skin cancer. It can also help prevent wrinkles and other aging effects caused by the sun. Make sure your children also follow these safeguards. Now is the time to start taking preventive steps against skin cancer.  When you are outdoors  Protect your skin when you go outdoors during the day. Take precautions whenever you go out to eat, run errands by car or on foot, or do any outdoor activity. There isnt just one easy way to protect your skin. Its best to follow all of these steps:  · Wear tightly woven clothing that covers your skin. Put on a wide-brimmed hat to protect your face, ears, and scalp.  · Watch the clock. Try to avoid the sun between 10 a.m. and 4 p.m., when it is strongest.  · Head for the shade or create your own. Use an umbrella when sitting or strolling.  · Know that the suns rays can reflect off sand, water, and snow. This can harm your skin. Take extra care when you are near reflective surfaces.  · Keep in mind that even when the weather is hazy or cloudy, your skin can be exposed to strong UV rays.  · Shield your skin with sunscreen. Also apply sunscreen to your childrens skin.  Tips for using sunscreen  To help prevent skin cancer, choose the right sunscreen and use it correctly. Try the following tips:  · Choose a sunscreen that has a sun protection factor (SPF) of at least 30.  Also choose a sunscreen labeled broad spectrum. This will shield you from both UVA and UVB (ultraviolet A and B) rays.  · If one brand irritates your skin, try another, particularly ones without fragrance.  · Use a water-resistant sunscreen if you swim or sweat.  · Use at least an ounce of sunscreen to cover exposed areas. This is enough to fill a shot glass. You might need to adjust the amount depending on your body size.  · Apply the sunscreen to dry skin about 15 minutes before going outdoors. This gives it time to be absorbed.  · Reapply sunscreen every 2 hours. If youre active, do this more often.  · Cover any sun-exposed skin, from your face to your feet. Dont forget your ears and your lips.  · Know that while sunscreen helps protect you, it isnt enough. Sunscreens extend the length of time you can be outdoors before your skin begins to redden, but they don't give you total protection. Using sunscreen doesn't mean you can stay out in the sun indefinitely. Damage to the skin cells is still occurring. You should also wear protective clothing. And try to stay out of the sun as much as you can, especially from 10 a.m. to 4 p.m.  Date Last Reviewed: 1/1/2017  © 6205-5387 [employer]. [address] [place] [address], [place] [address]. All rights reserved. This information is not intended as a substitute for professional medical care. Always follow your healthcare professional's instructions.

## 2020-12-14 NOTE — TELEPHONE ENCOUNTER
----- Message from Olga López MA sent at 12/14/2020  9:09 AM CST -----  Pt Is requesting a referral for Dermatology   Reason Skin spots  Call back # [phone]

## 2021-01-14 ENCOUNTER — OFFICE VISIT (OUTPATIENT)
Dept: PAIN MEDICINE | Facility: CLINIC | Age: 51
End: 2021-01-14
Payer: OTHER GOVERNMENT

## 2021-01-14 ENCOUNTER — HOSPITAL ENCOUNTER (OUTPATIENT)
Dept: RADIOLOGY | Facility: HOSPITAL | Age: 51
Discharge: HOME | End: 2021-01-14
Attending: NURSE PRACTITIONER
Payer: OTHER GOVERNMENT

## 2021-01-14 VITALS
BODY MASS INDEX: 29.56 KG/M2 | DIASTOLIC BLOOD PRESSURE: 84 MMHG | WEIGHT: 218.25 LBS | TEMPERATURE: 97 F | SYSTOLIC BLOOD PRESSURE: 126 MMHG | HEART RATE: 91 BPM | RESPIRATION RATE: 18 BRPM | HEIGHT: 72 IN

## 2021-01-14 DIAGNOSIS — M25.512 CHRONIC LEFT SHOULDER PAIN: ICD-10-CM

## 2021-01-14 DIAGNOSIS — G89.29 CHRONIC LEFT SHOULDER PAIN: ICD-10-CM

## 2021-01-14 DIAGNOSIS — G89.29 CHRONIC LEFT SHOULDER PAIN: Primary | ICD-10-CM

## 2021-01-14 DIAGNOSIS — M25.512 CHRONIC LEFT SHOULDER PAIN: Primary | ICD-10-CM

## 2021-01-14 DIAGNOSIS — M79.18 MYOFASCIAL PAIN: ICD-10-CM

## 2021-01-14 DIAGNOSIS — M47.816 LUMBAR SPONDYLOSIS: ICD-10-CM

## 2021-01-14 DIAGNOSIS — M54.12 CERVICAL RADICULOPATHY: ICD-10-CM

## 2021-01-14 PROCEDURE — 73030 XR SHOULDER COMPLETE 2 OR MORE VIEWS LEFT: ICD-10-PCS | Mod: 26,LT,, | Performed by: RADIOLOGY

## 2021-01-14 PROCEDURE — 73030 X-RAY EXAM OF SHOULDER: CPT | Mod: TC,FY,LT

## 2021-01-14 PROCEDURE — 99213 OFFICE O/P EST LOW 20 MIN: CPT | Mod: PBBFAC | Performed by: NURSE PRACTITIONER

## 2021-01-14 PROCEDURE — 99999 PR PBB SHADOW E&M-EST. PATIENT-LVL III: CPT | Mod: PBBFAC,,, | Performed by: NURSE PRACTITIONER

## 2021-01-14 PROCEDURE — 99999 PR PBB SHADOW E&M-EST. PATIENT-LVL III: ICD-10-PCS | Mod: PBBFAC,,, | Performed by: NURSE PRACTITIONER

## 2021-01-14 PROCEDURE — 99213 PR OFFICE/OUTPT VISIT, EST, LEVL III, 20-29 MIN: ICD-10-PCS | Mod: S$PBB,,, | Performed by: NURSE PRACTITIONER

## 2021-01-14 PROCEDURE — 73030 X-RAY EXAM OF SHOULDER: CPT | Mod: 26,LT,, | Performed by: RADIOLOGY

## 2021-01-14 PROCEDURE — 99213 OFFICE O/P EST LOW 20 MIN: CPT | Mod: S$PBB,,, | Performed by: NURSE PRACTITIONER

## 2021-01-14 RX ORDER — CYCLOBENZAPRINE HCL 10 MG
10 TABLET ORAL 3 TIMES DAILY PRN
Qty: 90 TABLET | Refills: 2 | Status: SHIPPED | OUTPATIENT
Start: 2021-01-14 | End: 2021-02-13

## 2021-02-05 ENCOUNTER — HOSPITAL ENCOUNTER (OUTPATIENT)
Facility: OTHER | Age: 51
Discharge: HOME | End: 2021-02-05
Attending: ANESTHESIOLOGY | Admitting: ANESTHESIOLOGY
Payer: OTHER GOVERNMENT

## 2021-02-05 VITALS
WEIGHT: 210 LBS | BODY MASS INDEX: 28.44 KG/M2 | SYSTOLIC BLOOD PRESSURE: 123 MMHG | HEIGHT: 72 IN | HEART RATE: 62 BPM | DIASTOLIC BLOOD PRESSURE: 75 MMHG | RESPIRATION RATE: 16 BRPM | OXYGEN SATURATION: 98 % | TEMPERATURE: 98 F

## 2021-02-05 DIAGNOSIS — M47.816 LUMBAR SPONDYLOSIS: Primary | ICD-10-CM

## 2021-02-05 DIAGNOSIS — G89.29 CHRONIC PAIN: ICD-10-CM

## 2021-02-05 PROCEDURE — 64636 DESTROY L/S FACET JNT ADDL: CPT | Mod: RT,,, | Performed by: ANESTHESIOLOGY

## 2021-02-05 PROCEDURE — 25000003 PHARM REV CODE 250: Performed by: ANESTHESIOLOGY

## 2021-02-05 PROCEDURE — 64636 DESTROY L/S FACET JNT ADDL: CPT | Mod: RT | Performed by: ANESTHESIOLOGY

## 2021-02-05 PROCEDURE — 64635 PR DESTROY LUMB/SAC FACET JNT: ICD-10-PCS | Mod: RT,,, | Performed by: ANESTHESIOLOGY

## 2021-02-05 PROCEDURE — 64636 PR DESTROY L/S FACET JNT ADDL: ICD-10-PCS | Mod: RT,,, | Performed by: ANESTHESIOLOGY

## 2021-02-05 PROCEDURE — 64635 DESTROY LUMB/SAC FACET JNT: CPT | Mod: RT | Performed by: ANESTHESIOLOGY

## 2021-02-05 PROCEDURE — 64635 DESTROY LUMB/SAC FACET JNT: CPT | Mod: RT,,, | Performed by: ANESTHESIOLOGY

## 2021-02-05 PROCEDURE — 63600175 PHARM REV CODE 636 W HCPCS: Performed by: ANESTHESIOLOGY

## 2021-02-05 RX ORDER — BUPIVACAINE HYDROCHLORIDE 2.5 MG/ML
INJECTION, SOLUTION EPIDURAL; INFILTRATION; INTRACAUDAL
Status: DISCONTINUED | OUTPATIENT
Start: 2021-02-05 | End: 2021-02-05 | Stop reason: HOSPADM

## 2021-02-05 RX ORDER — SODIUM CHLORIDE 9 MG/ML
INJECTION, SOLUTION INTRAVENOUS CONTINUOUS
Status: DISCONTINUED | OUTPATIENT
Start: 2021-02-05 | End: 2021-02-05 | Stop reason: HOSPADM

## 2021-02-05 RX ORDER — MIDAZOLAM HYDROCHLORIDE 1 MG/ML
INJECTION INTRAMUSCULAR; INTRAVENOUS
Status: DISCONTINUED | OUTPATIENT
Start: 2021-02-05 | End: 2021-02-05 | Stop reason: HOSPADM

## 2021-02-05 RX ORDER — LIDOCAINE HYDROCHLORIDE 10 MG/ML
INJECTION INFILTRATION; PERINEURAL
Status: DISCONTINUED | OUTPATIENT
Start: 2021-02-05 | End: 2021-02-05 | Stop reason: HOSPADM

## 2021-02-05 RX ORDER — FENTANYL CITRATE 50 UG/ML
INJECTION, SOLUTION INTRAMUSCULAR; INTRAVENOUS
Status: DISCONTINUED | OUTPATIENT
Start: 2021-02-05 | End: 2021-02-05 | Stop reason: HOSPADM

## 2021-02-05 RX ORDER — DEXAMETHASONE SODIUM PHOSPHATE 4 MG/ML
INJECTION, SOLUTION INTRA-ARTICULAR; INTRALESIONAL; INTRAMUSCULAR; INTRAVENOUS; SOFT TISSUE
Status: DISCONTINUED | OUTPATIENT
Start: 2021-02-05 | End: 2021-02-05 | Stop reason: HOSPADM

## 2021-02-05 RX ADMIN — SODIUM CHLORIDE: 0.9 INJECTION, SOLUTION INTRAVENOUS at 09:02

## 2021-02-18 ENCOUNTER — PATIENT MESSAGE (OUTPATIENT)
Dept: PAIN MEDICINE | Facility: OTHER | Age: 51
End: 2021-02-18

## 2021-02-19 ENCOUNTER — HOSPITAL ENCOUNTER (OUTPATIENT)
Facility: OTHER | Age: 51
Discharge: HOME | End: 2021-02-19
Attending: ANESTHESIOLOGY | Admitting: ANESTHESIOLOGY
Payer: OTHER GOVERNMENT

## 2021-02-19 VITALS
HEIGHT: 72 IN | BODY MASS INDEX: 27.77 KG/M2 | DIASTOLIC BLOOD PRESSURE: 91 MMHG | OXYGEN SATURATION: 92 % | WEIGHT: 205 LBS | TEMPERATURE: 99 F | HEART RATE: 81 BPM | SYSTOLIC BLOOD PRESSURE: 144 MMHG | RESPIRATION RATE: 18 BRPM

## 2021-02-19 DIAGNOSIS — G89.29 CHRONIC PAIN: ICD-10-CM

## 2021-02-19 DIAGNOSIS — M47.816 LUMBAR SPONDYLOSIS: Primary | ICD-10-CM

## 2021-02-19 PROCEDURE — 64636 DESTROY L/S FACET JNT ADDL: CPT | Mod: LT | Performed by: ANESTHESIOLOGY

## 2021-02-19 PROCEDURE — 64635 DESTROY LUMB/SAC FACET JNT: CPT | Mod: LT,,, | Performed by: ANESTHESIOLOGY

## 2021-02-19 PROCEDURE — 64494 INJ PARAVERT F JNT L/S 2 LEV: CPT | Mod: LT | Performed by: ANESTHESIOLOGY

## 2021-02-19 PROCEDURE — 64635 PR DESTROY LUMB/SAC FACET JNT: ICD-10-PCS | Mod: LT,,, | Performed by: ANESTHESIOLOGY

## 2021-02-19 PROCEDURE — 64636 PR DESTROY L/S FACET JNT ADDL: ICD-10-PCS | Mod: LT,,, | Performed by: ANESTHESIOLOGY

## 2021-02-19 PROCEDURE — 63600175 PHARM REV CODE 636 W HCPCS: Performed by: ANESTHESIOLOGY

## 2021-02-19 PROCEDURE — 64636 DESTROY L/S FACET JNT ADDL: CPT | Mod: LT,,, | Performed by: ANESTHESIOLOGY

## 2021-02-19 PROCEDURE — 64635 DESTROY LUMB/SAC FACET JNT: CPT | Mod: LT | Performed by: ANESTHESIOLOGY

## 2021-02-19 PROCEDURE — 25000003 PHARM REV CODE 250: Performed by: ANESTHESIOLOGY

## 2021-02-19 RX ORDER — LIDOCAINE HYDROCHLORIDE 10 MG/ML
INJECTION INFILTRATION; PERINEURAL
Status: DISCONTINUED | OUTPATIENT
Start: 2021-02-19 | End: 2021-02-19 | Stop reason: HOSPADM

## 2021-02-19 RX ORDER — MIDAZOLAM HYDROCHLORIDE 1 MG/ML
INJECTION INTRAMUSCULAR; INTRAVENOUS
Status: DISCONTINUED | OUTPATIENT
Start: 2021-02-19 | End: 2021-02-19 | Stop reason: HOSPADM

## 2021-02-19 RX ORDER — BUPIVACAINE HYDROCHLORIDE 2.5 MG/ML
INJECTION, SOLUTION EPIDURAL; INFILTRATION; INTRACAUDAL
Status: DISCONTINUED | OUTPATIENT
Start: 2021-02-19 | End: 2021-02-19 | Stop reason: HOSPADM

## 2021-02-19 RX ORDER — DEXAMETHASONE SODIUM PHOSPHATE 4 MG/ML
INJECTION, SOLUTION INTRA-ARTICULAR; INTRALESIONAL; INTRAMUSCULAR; INTRAVENOUS; SOFT TISSUE
Status: DISCONTINUED | OUTPATIENT
Start: 2021-02-19 | End: 2021-02-19 | Stop reason: HOSPADM

## 2021-02-19 RX ORDER — SODIUM CHLORIDE 9 MG/ML
INJECTION, SOLUTION INTRAVENOUS CONTINUOUS
Status: DISCONTINUED | OUTPATIENT
Start: 2021-02-19 | End: 2021-02-19 | Stop reason: HOSPADM

## 2021-02-19 RX ORDER — FENTANYL CITRATE 50 UG/ML
INJECTION, SOLUTION INTRAMUSCULAR; INTRAVENOUS
Status: DISCONTINUED | OUTPATIENT
Start: 2021-02-19 | End: 2021-02-19 | Stop reason: HOSPADM

## 2021-02-19 RX ADMIN — SODIUM CHLORIDE: 0.9 INJECTION, SOLUTION INTRAVENOUS at 09:02

## 2021-03-18 ENCOUNTER — TELEPHONE (OUTPATIENT)
Dept: PAIN MEDICINE | Facility: CLINIC | Age: 51
End: 2021-03-18

## 2021-03-18 ENCOUNTER — PATIENT MESSAGE (OUTPATIENT)
Dept: PAIN MEDICINE | Facility: CLINIC | Age: 51
End: 2021-03-18

## 2021-03-19 ENCOUNTER — TELEPHONE (OUTPATIENT)
Dept: FAMILY MEDICINE | Facility: CLINIC | Age: 51
End: 2021-03-19

## 2021-03-22 ENCOUNTER — OFFICE VISIT (OUTPATIENT)
Dept: PAIN MEDICINE | Facility: CLINIC | Age: 51
End: 2021-03-22
Payer: OTHER GOVERNMENT

## 2021-03-22 VITALS
WEIGHT: 225.31 LBS | BODY MASS INDEX: 30.52 KG/M2 | DIASTOLIC BLOOD PRESSURE: 95 MMHG | HEART RATE: 77 BPM | RESPIRATION RATE: 18 BRPM | HEIGHT: 72 IN | SYSTOLIC BLOOD PRESSURE: 147 MMHG

## 2021-03-22 DIAGNOSIS — M50.30 DDD (DEGENERATIVE DISC DISEASE), CERVICAL: ICD-10-CM

## 2021-03-22 DIAGNOSIS — M54.16 LUMBAR RADICULOPATHY: ICD-10-CM

## 2021-03-22 DIAGNOSIS — M47.816 LUMBAR SPONDYLOSIS: Primary | ICD-10-CM

## 2021-03-22 DIAGNOSIS — M54.12 CERVICAL RADICULOPATHY: ICD-10-CM

## 2021-03-22 DIAGNOSIS — M51.36 DDD (DEGENERATIVE DISC DISEASE), LUMBAR: ICD-10-CM

## 2021-03-22 PROBLEM — M51.369 DDD (DEGENERATIVE DISC DISEASE), LUMBAR: Status: ACTIVE | Noted: 2021-03-22

## 2021-03-22 PROCEDURE — 99999 PR PBB SHADOW E&M-EST. PATIENT-LVL III: CPT | Mod: PBBFAC,,, | Performed by: ANESTHESIOLOGY

## 2021-03-22 PROCEDURE — 99213 OFFICE O/P EST LOW 20 MIN: CPT | Mod: PBBFAC | Performed by: ANESTHESIOLOGY

## 2021-03-22 PROCEDURE — 99214 PR OFFICE/OUTPT VISIT, EST, LEVL IV, 30-39 MIN: ICD-10-PCS | Mod: S$PBB,,, | Performed by: ANESTHESIOLOGY

## 2021-03-22 PROCEDURE — 99214 OFFICE O/P EST MOD 30 MIN: CPT | Mod: S$PBB,,, | Performed by: ANESTHESIOLOGY

## 2021-03-22 PROCEDURE — 99999 PR PBB SHADOW E&M-EST. PATIENT-LVL III: ICD-10-PCS | Mod: PBBFAC,,, | Performed by: ANESTHESIOLOGY

## 2021-03-26 ENCOUNTER — TELEPHONE (OUTPATIENT)
Dept: PAIN MEDICINE | Facility: CLINIC | Age: 51
End: 2021-03-26

## 2021-04-09 ENCOUNTER — TELEPHONE (OUTPATIENT)
Dept: DERMATOLOGY | Facility: CLINIC | Age: 51
End: 2021-04-09

## 2021-04-09 ENCOUNTER — HOSPITAL ENCOUNTER (OUTPATIENT)
Facility: OTHER | Age: 51
Discharge: HOME | End: 2021-04-09
Attending: ANESTHESIOLOGY | Admitting: ANESTHESIOLOGY
Payer: OTHER GOVERNMENT

## 2021-04-09 VITALS
OXYGEN SATURATION: 96 % | WEIGHT: 215 LBS | DIASTOLIC BLOOD PRESSURE: 55 MMHG | TEMPERATURE: 97 F | RESPIRATION RATE: 16 BRPM | BODY MASS INDEX: 29.12 KG/M2 | HEART RATE: 75 BPM | HEIGHT: 72 IN | SYSTOLIC BLOOD PRESSURE: 95 MMHG

## 2021-04-09 DIAGNOSIS — G89.29 CHRONIC PAIN: ICD-10-CM

## 2021-04-09 DIAGNOSIS — M54.12 CERVICAL RADICULOPATHY: Primary | ICD-10-CM

## 2021-04-09 PROCEDURE — 62321 NJX INTERLAMINAR CRV/THRC: CPT | Mod: ,,, | Performed by: ANESTHESIOLOGY

## 2021-04-09 PROCEDURE — 63600175 PHARM REV CODE 636 W HCPCS: Performed by: ANESTHESIOLOGY

## 2021-04-09 PROCEDURE — 62321 PR INJ CERV/THORAC, W/GUIDANCE: ICD-10-PCS | Mod: ,,, | Performed by: ANESTHESIOLOGY

## 2021-04-09 PROCEDURE — 25500020 PHARM REV CODE 255: Performed by: ANESTHESIOLOGY

## 2021-04-09 PROCEDURE — 25000003 PHARM REV CODE 250: Performed by: ANESTHESIOLOGY

## 2021-04-09 PROCEDURE — 62321 NJX INTERLAMINAR CRV/THRC: CPT | Performed by: ANESTHESIOLOGY

## 2021-04-09 RX ORDER — MIDAZOLAM HYDROCHLORIDE 1 MG/ML
INJECTION INTRAMUSCULAR; INTRAVENOUS
Status: DISCONTINUED | OUTPATIENT
Start: 2021-04-09 | End: 2021-04-09 | Stop reason: HOSPADM

## 2021-04-09 RX ORDER — DEXAMETHASONE SODIUM PHOSPHATE 4 MG/ML
INJECTION, SOLUTION INTRA-ARTICULAR; INTRALESIONAL; INTRAMUSCULAR; INTRAVENOUS; SOFT TISSUE
Status: DISCONTINUED | OUTPATIENT
Start: 2021-04-09 | End: 2021-04-09 | Stop reason: HOSPADM

## 2021-04-09 RX ORDER — LIDOCAINE HYDROCHLORIDE 5 MG/ML
INJECTION, SOLUTION INFILTRATION; INTRAVENOUS
Status: DISCONTINUED | OUTPATIENT
Start: 2021-04-09 | End: 2021-04-09 | Stop reason: HOSPADM

## 2021-04-09 RX ORDER — SODIUM CHLORIDE 9 MG/ML
INJECTION, SOLUTION INTRAVENOUS CONTINUOUS
Status: DISCONTINUED | OUTPATIENT
Start: 2021-04-09 | End: 2021-04-09 | Stop reason: HOSPADM

## 2021-04-09 RX ORDER — LIDOCAINE HYDROCHLORIDE 10 MG/ML
INJECTION INFILTRATION; PERINEURAL
Status: DISCONTINUED | OUTPATIENT
Start: 2021-04-09 | End: 2021-04-09 | Stop reason: HOSPADM

## 2021-04-09 RX ADMIN — SODIUM CHLORIDE: 0.9 INJECTION, SOLUTION INTRAVENOUS at 10:04

## 2021-04-24 ENCOUNTER — PATIENT OUTREACH (OUTPATIENT)
Dept: ADMINISTRATIVE | Facility: OTHER | Age: 51
End: 2021-04-24

## 2021-10-26 ENCOUNTER — PATIENT MESSAGE (OUTPATIENT)
Dept: FAMILY MEDICINE | Facility: CLINIC | Age: 51
End: 2021-10-26
Payer: OTHER GOVERNMENT

## 2022-01-31 ENCOUNTER — PATIENT MESSAGE (OUTPATIENT)
Dept: PAIN MEDICINE | Facility: CLINIC | Age: 52
End: 2022-01-31
Payer: OTHER GOVERNMENT

## 2022-02-01 ENCOUNTER — OFFICE VISIT (OUTPATIENT)
Dept: PAIN MEDICINE | Facility: CLINIC | Age: 52
End: 2022-02-01
Payer: OTHER GOVERNMENT

## 2022-02-01 ENCOUNTER — TELEPHONE (OUTPATIENT)
Dept: PAIN MEDICINE | Facility: OTHER | Age: 52
End: 2022-02-01
Payer: OTHER GOVERNMENT

## 2022-02-01 DIAGNOSIS — M50.30 DDD (DEGENERATIVE DISC DISEASE), CERVICAL: ICD-10-CM

## 2022-02-01 DIAGNOSIS — M51.36 DDD (DEGENERATIVE DISC DISEASE), LUMBAR: ICD-10-CM

## 2022-02-01 DIAGNOSIS — M47.816 LUMBAR SPONDYLOSIS: Primary | ICD-10-CM

## 2022-02-01 DIAGNOSIS — M54.12 CERVICAL RADICULOPATHY: ICD-10-CM

## 2022-02-01 DIAGNOSIS — M54.2 CERVICALGIA: ICD-10-CM

## 2022-02-01 PROCEDURE — 99213 OFFICE O/P EST LOW 20 MIN: CPT | Mod: 95,,, | Performed by: ANESTHESIOLOGY

## 2022-02-01 PROCEDURE — 99213 PR OFFICE/OUTPT VISIT, EST, LEVL III, 20-29 MIN: ICD-10-PCS | Mod: 95,,, | Performed by: ANESTHESIOLOGY
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
## 2022-02-02 ENCOUNTER — PATIENT MESSAGE (OUTPATIENT)
Dept: PAIN MEDICINE | Facility: OTHER | Age: 52
End: 2022-02-02
Payer: OTHER GOVERNMENT

## 2022-02-02 ENCOUNTER — TELEPHONE (OUTPATIENT)
Dept: PAIN MEDICINE | Facility: OTHER | Age: 52
End: 2022-02-02
Payer: OTHER GOVERNMENT

## 2022-02-02 DIAGNOSIS — M47.816 LUMBAR SPONDYLOSIS: Primary | ICD-10-CM

## 2022-02-02 DIAGNOSIS — M54.12 CERVICAL RADICULOPATHY: Primary | ICD-10-CM

## 2022-02-07 ENCOUNTER — TELEPHONE (OUTPATIENT)
Dept: PAIN MEDICINE | Facility: CLINIC | Age: 52
End: 2022-02-07
Payer: OTHER GOVERNMENT

## 2022-02-07 DIAGNOSIS — M54.2 CERVICALGIA: ICD-10-CM

## 2022-02-07 DIAGNOSIS — M48.02 SPINAL STENOSIS, CERVICAL REGION: ICD-10-CM

## 2022-02-07 DIAGNOSIS — M54.9 DORSALGIA, UNSPECIFIED: ICD-10-CM

## 2022-02-07 NOTE — TELEPHONE ENCOUNTER
Staff contacted patient to schedule MRI's orders placed by Pain Provider Dr. Seamus Sotelo MD which must be completed before patient schedule procedures: 2/17/22 & 2/24/22          Pt did not answer.

## 2022-02-07 NOTE — TELEPHONE ENCOUNTER
----- Message from Onesimo Dow MA sent at 2/7/2022  3:36 PM CST -----    ----- Message -----  From: Reina Ross  Sent: 2/7/2022   3:36 PM CST  To: Onesimo Dow MA    Yes whatever is in the system I uploaded to them but I only see the ones from 2020 do they have a more recent one?  ----- Message -----  From: Onesimo Dow MA  Sent: 2/7/2022   3:28 PM CST  To: Reina Ross      ----- Message -----  From: Seamus Sotelo MD  Sent: 2/7/2022   3:28 PM CST  To: Onesimo Dow MA    Do they want bot Neck and lumbar?      ----- Message -----  From: Onesimo Dow MA  Sent: 2/7/2022   3:14 PM CST  To: Seamus Sotelo MD      ----- Message -----  From: Reina Ross  Sent: 2/7/2022   3:07 PM CST  To: [address] Staff    Need additional info Good afternoon this pt insurance is requesting a new up to date MRI report the one from 2020 is too old 2nd request

## 2022-02-09 ENCOUNTER — TELEPHONE (OUTPATIENT)
Dept: PAIN MEDICINE | Facility: CLINIC | Age: 52
End: 2022-02-09
Payer: OTHER GOVERNMENT

## 2022-02-09 NOTE — TELEPHONE ENCOUNTER
Staff tried contacting patient in regards of message regarding procedure on 02/10/22    Patient voicemail box was full. N/a

## 2022-02-09 NOTE — TELEPHONE ENCOUNTER
----- Message from Reina Ross sent at 2/9/2022  4:05 PM CST -----  Good afternoon this member case for tomorrow is still pending and is requesting a call from you all to see how he should proceed for tomorrow

## 2022-02-09 NOTE — TELEPHONE ENCOUNTER
----- Message from Jo Ann Cabello sent at 2/9/2022  3:41 PM CST -----  Regarding: Procedure  Name of Who is Calling: ARIADNE PERDOMO [[id number]]      What is the request in detail: Patient is requesting a call back he wants to see if he is still having his procedure he states per the billing dept his procedure was not approved       Can the clinic reply by [facility]: no      What Number to Call Back if not in [facility]: [phone]

## 2022-02-09 NOTE — TELEPHONE ENCOUNTER
Staff tried contacting patient in regards of message about procedure on 02/10/22    Patient voicemail box was full

## 2022-02-09 NOTE — TELEPHONE ENCOUNTER
Staff tried contacting patient back in regards of message regarding procedure on 02/10/22    Patient voicemail box was full

## 2022-02-10 ENCOUNTER — TELEPHONE (OUTPATIENT)
Dept: PAIN MEDICINE | Facility: OTHER | Age: 52
End: 2022-02-10
Payer: OTHER GOVERNMENT

## 2022-02-10 ENCOUNTER — HOSPITAL ENCOUNTER (OUTPATIENT)
Facility: OTHER | Age: 52
Discharge: HOME | End: 2022-02-10
Attending: ANESTHESIOLOGY | Admitting: ANESTHESIOLOGY
Payer: OTHER GOVERNMENT

## 2022-02-10 VITALS
WEIGHT: 210 LBS | HEART RATE: 75 BPM | RESPIRATION RATE: 16 BRPM | BODY MASS INDEX: 28.44 KG/M2 | DIASTOLIC BLOOD PRESSURE: 94 MMHG | SYSTOLIC BLOOD PRESSURE: 145 MMHG | HEIGHT: 72 IN | OXYGEN SATURATION: 96 % | TEMPERATURE: 98 F

## 2022-02-10 DIAGNOSIS — G89.29 CHRONIC PAIN: ICD-10-CM

## 2022-02-10 DIAGNOSIS — M47.816 OSTEOARTHRITIS OF LUMBAR SPINE, UNSPECIFIED SPINAL OSTEOARTHRITIS COMPLICATION STATUS: Primary | ICD-10-CM

## 2022-02-10 DIAGNOSIS — M47.816 LUMBAR SPONDYLOSIS: ICD-10-CM

## 2022-02-10 PROCEDURE — 99152 MOD SED SAME PHYS/QHP 5/>YRS: CPT | Performed by: ANESTHESIOLOGY

## 2022-02-10 PROCEDURE — 64635 DESTROY LUMB/SAC FACET JNT: CPT | Mod: LT | Performed by: ANESTHESIOLOGY

## 2022-02-10 PROCEDURE — 99152 PR MOD CONSCIOUS SEDATION, SAME PHYS, 5+ YRS, FIRST 15 MIN: ICD-10-PCS | Mod: ,,, | Performed by: ANESTHESIOLOGY

## 2022-02-10 PROCEDURE — 64635 DESTROY LUMB/SAC FACET JNT: CPT | Mod: LT,,, | Performed by: ANESTHESIOLOGY

## 2022-02-10 PROCEDURE — 64635 PR DESTROY LUMB/SAC FACET JNT: ICD-10-PCS | Mod: LT,,, | Performed by: ANESTHESIOLOGY

## 2022-02-10 PROCEDURE — 99152 MOD SED SAME PHYS/QHP 5/>YRS: CPT | Mod: ,,, | Performed by: ANESTHESIOLOGY

## 2022-02-10 PROCEDURE — 63600175 PHARM REV CODE 636 W HCPCS: Performed by: ANESTHESIOLOGY

## 2022-02-10 PROCEDURE — 25000003 PHARM REV CODE 250: Performed by: ANESTHESIOLOGY

## 2022-02-10 RX ORDER — DEXAMETHASONE SODIUM PHOSPHATE 10 MG/ML
INJECTION INTRAMUSCULAR; INTRAVENOUS
Status: DISCONTINUED | OUTPATIENT
Start: 2022-02-10 | End: 2022-02-10 | Stop reason: HOSPADM

## 2022-02-10 RX ORDER — BUPIVACAINE HYDROCHLORIDE 2.5 MG/ML
INJECTION, SOLUTION EPIDURAL; INFILTRATION; INTRACAUDAL
Status: DISCONTINUED | OUTPATIENT
Start: 2022-02-10 | End: 2022-02-10 | Stop reason: HOSPADM

## 2022-02-10 RX ORDER — MIDAZOLAM HYDROCHLORIDE 1 MG/ML
INJECTION INTRAMUSCULAR; INTRAVENOUS
Status: DISCONTINUED | OUTPATIENT
Start: 2022-02-10 | End: 2022-02-10 | Stop reason: HOSPADM

## 2022-02-10 RX ORDER — SODIUM CHLORIDE 9 MG/ML
INJECTION, SOLUTION INTRAVENOUS CONTINUOUS
Status: DISCONTINUED | OUTPATIENT
Start: 2022-02-10 | End: 2022-02-10 | Stop reason: HOSPADM

## 2022-02-10 RX ORDER — FENTANYL CITRATE 50 UG/ML
INJECTION, SOLUTION INTRAMUSCULAR; INTRAVENOUS
Status: DISCONTINUED | OUTPATIENT
Start: 2022-02-10 | End: 2022-02-10 | Stop reason: HOSPADM

## 2022-02-10 RX ORDER — LIDOCAINE HYDROCHLORIDE 20 MG/ML
INJECTION, SOLUTION INFILTRATION; PERINEURAL
Status: DISCONTINUED | OUTPATIENT
Start: 2022-02-10 | End: 2022-02-10 | Stop reason: HOSPADM

## 2022-02-10 NOTE — DISCHARGE SUMMARY
Discharge Note  Short Stay      SUMMARY     Admit Date: 2/10/2022    Attending Physician: Seamus Sotelo      Discharge Physician: Seamus Sotelo      Discharge Date: 2/10/2022 2:31 PM    Procedure(s) (LRB):  Radiofrequency Ablation LEFT L3,4,5 (Left)    Final Diagnosis: Lumbar spondylosis [M47.816]    Disposition: Home or self care    Patient Instructions:   Current Discharge Medication List      CONTINUE these medications which have NOT CHANGED    Details   acetaminophen (TYLENOL) 325 MG tablet Take 2 tablets (650 mg total) by mouth every 4 (four) hours as needed.  Refills: 0      amitriptyline (ELAVIL) 10 MG tablet TAKE 1 TABLET NIGHTLY AS NEEDED  Qty: 90 tablet, Refills: 3    Associated Diagnoses: Mild anxiety      fluticasone (FLONASE) 50 mcg/actuation nasal spray 1 spray by Each Nare route once daily.  Qty: 1 Bottle, Refills: 4    Associated Diagnoses: Acute seasonal allergic rhinitis, unspecified trigger      lidocaine (LIDODERM) 5 % Place 1 patch onto the skin once daily. Remove & Discard patch within 12 hours or as directed by MD  Qty: 30 patch, Refills: 3      loratadine (CLARITIN) 10 mg tablet Take 10 mg by mouth once daily.      multivitamin capsule Take 1 capsule by mouth once daily.      NEXIUM 20 mg capsule TAKE 1 CAPSULE DAILY  Qty: 90 capsule, Refills: 3    Associated Diagnoses: Gastroesophageal reflux disease without esophagitis      triamcinolone acetonide 0.025% (KENALOG) 0.025 % cream Apply topically 2 (two) times daily. Apply to the affected area sparingly as needed twice a day.  Qty: 15 g, Refills: 1    Associated Diagnoses: Rash of hands                 Discharge Diagnosis: Lumbar spondylosis [M47.816]  Condition on Discharge: Stable with no complications to procedure   Diet on Discharge: Same as before.  Activity: as per instruction sheet.  Discharge to: Home with a responsible adult.  Follow up: 2-4 weeks

## 2022-02-10 NOTE — DISCHARGE SUMMARY
Discharge Note  Short Stay      SUMMARY     Admit Date: 2/10/2022    Attending Physician: Seamus Sotelo      Discharge Physician: Seamus Sotelo      Discharge Date: 2/10/2022 2:29 PM    Procedure(s) (LRB):  Radiofrequency Ablation LEFT L3,4,5 (Left)    Final Diagnosis: Lumbar spondylosis [M47.816]    Disposition: Home or self care    Patient Instructions:   Current Discharge Medication List      CONTINUE these medications which have NOT CHANGED    Details   acetaminophen (TYLENOL) 325 MG tablet Take 2 tablets (650 mg total) by mouth every 4 (four) hours as needed.  Refills: 0      amitriptyline (ELAVIL) 10 MG tablet TAKE 1 TABLET NIGHTLY AS NEEDED  Qty: 90 tablet, Refills: 3    Associated Diagnoses: Mild anxiety      fluticasone (FLONASE) 50 mcg/actuation nasal spray 1 spray by Each Nare route once daily.  Qty: 1 Bottle, Refills: 4    Associated Diagnoses: Acute seasonal allergic rhinitis, unspecified trigger      lidocaine (LIDODERM) 5 % Place 1 patch onto the skin once daily. Remove & Discard patch within 12 hours or as directed by MD  Qty: 30 patch, Refills: 3      loratadine (CLARITIN) 10 mg tablet Take 10 mg by mouth once daily.      multivitamin capsule Take 1 capsule by mouth once daily.      NEXIUM 20 mg capsule TAKE 1 CAPSULE DAILY  Qty: 90 capsule, Refills: 3    Associated Diagnoses: Gastroesophageal reflux disease without esophagitis      triamcinolone acetonide 0.025% (KENALOG) 0.025 % cream Apply topically 2 (two) times daily. Apply to the affected area sparingly as needed twice a day.  Qty: 15 g, Refills: 1    Associated Diagnoses: Rash of hands                 Discharge Diagnosis: Lumbar spondylosis [M47.816]  Condition on Discharge: Stable with no complications to procedure   Diet on Discharge: Same as before.  Activity: as per instruction sheet.  Discharge to: Home with a responsible adult.  Follow up: 2-4 weeks

## 2022-02-10 NOTE — OP NOTE
Therapeutic Lumbar Medial Branch Radiofrequency Ablation under Fluoroscopy     The procedure, risks, benefits, and options were discussed with the patient. There are no contraindications to the procedure. The patent expressed understanding and agreed to the procedure. Informed written consent was obtained prior to the start of the procedure and can be found in the patient's chart.        PATIENT NAME: Raciel White   MRN: [id number]     DATE OF PROCEDURE: 02/10/2022     PROCEDURE:  Left L3, L4 and L5 Lumbar Radiofrequency Ablation under Fluoroscopy    PRE-OP DIAGNOSIS: Lumbar spondylosis [M47.816]    POST-OP DIAGNOSIS: Same    PHYSICIAN: Seamus Sotelo MD    ASSISTANTS: Dr. Ivey     MEDICATIONS INJECTED:  Preservative-free Decadron 10mg with 9cc of Bupivicaine 0.25%    LOCAL ANESTHETIC INJECTED:   Xylocaine 2%    SEDATION:   Versed 2mg and Fentanyl 100mcg                                                                                                                                                                                     Conscious sedation ordered by M.D. Patient re-evaluation prior to administration of conscious sedation. No changes noted in patient's status from initial evaluation. The patient's vital signs were monitored by RN and patient remained hemodynamically stable throughout the procedure.    Event Time In   Sedation Start 1412   Sedation End 1428       ESTIMATED BLOOD LOSS:  None    COMPLICATIONS:  None     INTERVAL HISTORY: Patient has clinical and imaging findings suggestive of recurrent facet mediated pain. Patient has undergone a previous RFA at specified levels with at least 50% relief for at least 6 months. Successful diagnostic medial branch blocks have been completed within the past 2 years.    TECHNIQUE: Time-out was performed to identify the patient and procedure to be performed. With the patient laying in a prone position, the surgical area was prepped and draped in the usual  sterile fashion using ChloraPrep and fenestrated drape. The levels were determined under fluoroscopic guidance. Skin anesthesia was achieved by injecting Lidocaine 2% over the injection sites. A 20 gauge 10mm curved active tip needle was introduced to the anatomic local of the medial branch at each of the above levels using AP, lateral and/or contralateral oblique fluoroscopic imaging. Then sensory and motor testing was performed to confirm that the needle tips were in the correct location. After negative aspiration for blood or CSF was confirmed, 1 mL of the lidocaine 2% listed above was injected slowly at each site. This was followed by thermal lesioning at 80 degrees celsius for 90 seconds. That was followed by slowly injecting 1 mL of the medication mixture listed above at each site. The needles were removed and bleeding was nil. A sterile dressing was applied. No specimens collected. The patient tolerated the procedure well and did not have any procedure related motor deficit at the conclusion of the procedure.    The patient was monitored after the procedure in the recovery area. They were given post-procedure and discharge instructions to follow at home. The patient was discharged in a stable condition.    I reviewed and edited the fellow's note. I conducted my own interview and physical examination. I agree with the findings. I was present and supervising all critical portions of the procedure.    Seamus Sotelo MD

## 2022-02-10 NOTE — TELEPHONE ENCOUNTER
----- Message from Rachel Beatty MA sent at 2/9/2022  4:48 PM CST -----  Patient above is schedule for an procedure on tomorrow, and stating that someone contact him saying that his procedure was not approve for Radiofrequency Ablation LEFT L3,4,5      Do you all have any information on the patient procedure on 02/10 has been approved or not???      Thank you!      Rachel Beatty MA  [facility]  [address] [address]  [phone] [facility]  [phone] FAX

## 2022-02-10 NOTE — DISCHARGE INSTRUCTIONS
Thank you for allowing us to care for you today. You may receive a survey about the care we provided. Your feedback is valuable and helps us provide excellent care throughout the community.     Home Care Instructions for Pain Management:    1. DIET:   You may resume your normal diet today.   2. BATHING:   You may shower with luke warm water. No tub baths or anything that will soak injection sites under water for the next 24 hours.  3. DRESSING:   You may remove your bandage today.   4. ACTIVITY LEVEL:   You may resume your normal activities 24 hrs after your procedure. Nothing strenuous today.  5. MEDICATIONS:   You may resume your normal medications today. To restart blood thinners, ask your doctor.  6. DRIVING    If you have received any sedatives by mouth today, you may not drive for 12 hours.    If you have received any sedation through your IV, you may not drive for 24 hrs.   7. SPECIAL INSTRUCTIONS:   No heat to the injection site for 24 hrs including, hot bath or shower, heating pad, moist heat, or hot tubs.    Use ice pack to injection site for any pain or discomfort.  Apply ice packs for 20 minute intervals as needed.    IF you have diabetes, be sure to monitor your blood sugar more closely. IF your injection contained steroids your blood sugar levels may become higher than normal.    If you are still having pain upon discharge:  Your pain may improve over the next 48 hours. The anesthetic (numbing medication) works immediately to 48 hours. IF your injection contained a steroid (anti-inflammatory medication), it takes approximately 3 days to start feeling relief and 7-10 days to see your greatest results from the medication. It is possible you may need subsequent injections. This would be discussed at your follow up appointment with pain management or your referring doctor.    Please call the PAIN MANAGEMENT office at [phone] or ON CALL pager at [phone] if you experienced any:   -Weakness or  loss of sensation  -Fever > 101.5  -Pain uncontrolled with oral medications   -Persistent nausea, vomiting, or diarrhea  -Redness or drainage from the injection sites, or any other worrisome concerns.   If physician on call was not reached or could not communicate with our office for any reason please go to the nearest emergency department.  Patient Education       Moderate Sedation in Adults Discharge Instructions   About this topic   Moderate sedation is also known as conscious sedation. It changes your state of being awake or consciousness. With this sedation, you may feel slight pain or pressure during a procedure. The drugs help you to relax and may even allow you to sleep. It will be easy to wake you and you may talk and answer questions while under sedation. Most likely, you will not remember what happens while under this sedation.  What care is needed at home?   · Ask your doctor what you need to do when you go home. Make sure you understand everything the doctor says. This way you will know what you need to do.  · You will not be allowed to drive right away after the procedure. Ask a family member or a friend to drive you home.  · Do not operate heavy or dangerous machinery for at least 24 hours.  · Do not make major decisions or sign important papers for at least 24 hours. You may not be thinking clearly.  · Avoid beer, wine, or mixed drinks (alcohol) for at least 24 hours.  · You are at a higher risk of falling for at least 24 hours after moderate sedation.  ? Take extra care when you get up.  ? Do not change positions quickly.  ? Do not rush when you need to go to the bathroom or to answer the phone.  ? Ask for help if you feel unsteady when you try to walk.  ? Wear shoes with non-slip soles and low heels.  What follow-up care is needed?   Your doctor may ask you to make visits to the office to check on your progress. Be sure to keep these visits. Your doctor may also refer you to other doctors or tell  you that you need more tests or care.  What drugs may be needed?   The doctor may order drugs to:  · Help with pain  · Treat an upset stomach or throwing up  Will physical activity be limited?   Rest for the day of the procedure. Avoid strenuous activities like heavy lifting and hard exercise. Talk to your doctor about whether you need to limit lifting or exercise after your procedure.  What changes to diet are needed?   Start with a light diet when you are fully awake. This includes things that are easy to swallow like soups, pudding, jello, toast, and eggs. Slowly progress to your normal diet.  What problems could happen?   · Low blood pressure  · Breathing problems  · Upset stomach or throwing up  · Dizziness  When do I need to call the doctor?   · Feel dizzy, weak, or tired  · Faint  · Very bad headache  · Upset stomach or throwing up  · To follow up for more tests or care  Teach Back: Helping You Understand   The Teach Back Method helps you understand the information we are giving you. After you talk with the staff, tell them in your own words what you learned. This helps to make sure the staff has described each thing clearly. It also helps to explain things that may have been confusing. Before going home, make sure you can do these:  · I can tell you about my procedure.  · I can tell you if I need more tests or care.  · I can tell you what is good for me to eat and drink the next day.  · I can tell you what I would do if I feel dizzy, weak, or tired.  Last Reviewed Date   2020-03-02  Consumer Information Use and Disclaimer   This information is not specific medical advice and does not replace information you receive from your health care provider. This is only a brief summary of general information. It does NOT include all information about conditions, illnesses, injuries, tests, procedures, treatments, therapies, discharge instructions or life-style choices that may apply to you. You must talk with your  health care provider for complete information about your health and treatment options. This information should not be used to decide whether or not to accept your health care providers advice, instructions or recommendations. Only your health care provider has the knowledge and training to provide advice that is right for you.  Copyright   Copyright © 2021 [employer] and its affiliates and/or licensors. All rights reserved.

## 2022-02-15 ENCOUNTER — TELEPHONE (OUTPATIENT)
Dept: PAIN MEDICINE | Facility: CLINIC | Age: 52
End: 2022-02-15
Payer: OTHER GOVERNMENT

## 2022-02-15 NOTE — TELEPHONE ENCOUNTER
----- Message from Yuli Patel sent at 2/15/2022  9:30 AM CST -----  Regarding: Requesting sooner time  Name of Who is Calling:ARIADNE PERDOMO [[id number]]          What is the request in detail: Pt states on the 24 th he would like a earlier time, he states he has to get on a plane and cant make the 2pm time. Pt is requesting call back, please advise           Can the clinic reply by [facility]: no           What Number to Call Back if not in [facility]:[phone]

## 2022-02-17 ENCOUNTER — HOSPITAL ENCOUNTER (OUTPATIENT)
Facility: OTHER | Age: 52
Discharge: HOME | End: 2022-02-17
Attending: ANESTHESIOLOGY | Admitting: ANESTHESIOLOGY
Payer: OTHER GOVERNMENT

## 2022-02-17 VITALS
SYSTOLIC BLOOD PRESSURE: 143 MMHG | DIASTOLIC BLOOD PRESSURE: 92 MMHG | TEMPERATURE: 98 F | HEART RATE: 80 BPM | RESPIRATION RATE: 16 BRPM | OXYGEN SATURATION: 96 %

## 2022-02-17 DIAGNOSIS — M47.816 SPONDYLOSIS OF LUMBAR REGION WITHOUT MYELOPATHY OR RADICULOPATHY: Primary | ICD-10-CM

## 2022-02-17 DIAGNOSIS — G89.29 CHRONIC PAIN: ICD-10-CM

## 2022-02-17 PROCEDURE — 25000003 PHARM REV CODE 250: Performed by: ANESTHESIOLOGY

## 2022-02-17 PROCEDURE — 99152 PR MOD CONSCIOUS SEDATION, SAME PHYS, 5+ YRS, FIRST 15 MIN: ICD-10-PCS | Mod: ,,, | Performed by: ANESTHESIOLOGY

## 2022-02-17 PROCEDURE — 64636 DESTROY L/S FACET JNT ADDL: CPT | Mod: RT | Performed by: ANESTHESIOLOGY

## 2022-02-17 PROCEDURE — 64635 DESTROY LUMB/SAC FACET JNT: CPT | Mod: RT,,, | Performed by: ANESTHESIOLOGY

## 2022-02-17 PROCEDURE — 25000003 PHARM REV CODE 250: Performed by: STUDENT IN AN ORGANIZED HEALTH CARE EDUCATION/TRAINING PROGRAM

## 2022-02-17 PROCEDURE — 64635 PR DESTROY LUMB/SAC FACET JNT: ICD-10-PCS | Mod: RT,,, | Performed by: ANESTHESIOLOGY

## 2022-02-17 PROCEDURE — 64635 DESTROY LUMB/SAC FACET JNT: CPT | Mod: RT | Performed by: ANESTHESIOLOGY

## 2022-02-17 PROCEDURE — 99152 MOD SED SAME PHYS/QHP 5/>YRS: CPT | Performed by: ANESTHESIOLOGY

## 2022-02-17 PROCEDURE — 64636 PR DESTROY L/S FACET JNT ADDL: ICD-10-PCS | Mod: RT,,, | Performed by: ANESTHESIOLOGY

## 2022-02-17 PROCEDURE — 63600175 PHARM REV CODE 636 W HCPCS: Performed by: ANESTHESIOLOGY

## 2022-02-17 PROCEDURE — 99152 MOD SED SAME PHYS/QHP 5/>YRS: CPT | Mod: ,,, | Performed by: ANESTHESIOLOGY

## 2022-02-17 PROCEDURE — 64636 DESTROY L/S FACET JNT ADDL: CPT | Mod: RT,,, | Performed by: ANESTHESIOLOGY

## 2022-02-17 RX ORDER — DEXAMETHASONE SODIUM PHOSPHATE 10 MG/ML
INJECTION INTRAMUSCULAR; INTRAVENOUS
Status: DISCONTINUED | OUTPATIENT
Start: 2022-02-17 | End: 2022-02-17 | Stop reason: HOSPADM

## 2022-02-17 RX ORDER — LIDOCAINE HYDROCHLORIDE 20 MG/ML
INJECTION, SOLUTION INFILTRATION; PERINEURAL
Status: DISCONTINUED | OUTPATIENT
Start: 2022-02-17 | End: 2022-02-17 | Stop reason: HOSPADM

## 2022-02-17 RX ORDER — BUPIVACAINE HYDROCHLORIDE 2.5 MG/ML
INJECTION, SOLUTION EPIDURAL; INFILTRATION; INTRACAUDAL
Status: DISCONTINUED | OUTPATIENT
Start: 2022-02-17 | End: 2022-02-17 | Stop reason: HOSPADM

## 2022-02-17 RX ORDER — FENTANYL CITRATE 50 UG/ML
INJECTION, SOLUTION INTRAMUSCULAR; INTRAVENOUS
Status: DISCONTINUED | OUTPATIENT
Start: 2022-02-17 | End: 2022-02-17 | Stop reason: HOSPADM

## 2022-02-17 RX ORDER — MIDAZOLAM HYDROCHLORIDE 1 MG/ML
INJECTION INTRAMUSCULAR; INTRAVENOUS
Status: DISCONTINUED | OUTPATIENT
Start: 2022-02-17 | End: 2022-02-17 | Stop reason: HOSPADM

## 2022-02-17 RX ORDER — SODIUM CHLORIDE 9 MG/ML
500 INJECTION, SOLUTION INTRAVENOUS CONTINUOUS
Status: DISCONTINUED | OUTPATIENT
Start: 2022-02-17 | End: 2022-02-17 | Stop reason: HOSPADM

## 2022-02-17 RX ADMIN — SODIUM CHLORIDE 500 ML: 0.9 INJECTION, SOLUTION INTRAVENOUS at 01:02

## 2022-02-17 NOTE — DISCHARGE SUMMARY
Discharge Note  Short Stay      SUMMARY     Admit Date: 2/17/2022    Attending Physician: Seamus Sotelo      Discharge Physician: Seamus Sotelo      Discharge Date: 2/17/2022 2:30 PM    Procedure(s) (LRB):  Radiofrequency Ablation RIGHT L3,4,5 SCHEDULE 1 WEEK AFTER LEFT SIDE PER JENNIFER VERGARA (Right)    Final Diagnosis: Lumbar spondylosis [M47.816]    Disposition: Home or self care    Patient Instructions:   Current Discharge Medication List      CONTINUE these medications which have NOT CHANGED    Details   acetaminophen (TYLENOL) 325 MG tablet Take 2 tablets (650 mg total) by mouth every 4 (four) hours as needed.  Refills: 0      amitriptyline (ELAVIL) 10 MG tablet TAKE 1 TABLET NIGHTLY AS NEEDED  Qty: 90 tablet, Refills: 3    Associated Diagnoses: Mild anxiety      fluticasone (FLONASE) 50 mcg/actuation nasal spray 1 spray by Each Nare route once daily.  Qty: 1 Bottle, Refills: 4    Associated Diagnoses: Acute seasonal allergic rhinitis, unspecified trigger      lidocaine (LIDODERM) 5 % Place 1 patch onto the skin once daily. Remove & Discard patch within 12 hours or as directed by MD  Qty: 30 patch, Refills: 3      loratadine (CLARITIN) 10 mg tablet Take 10 mg by mouth once daily.      multivitamin capsule Take 1 capsule by mouth once daily.      NEXIUM 20 mg capsule TAKE 1 CAPSULE DAILY  Qty: 90 capsule, Refills: 3    Associated Diagnoses: Gastroesophageal reflux disease without esophagitis      triamcinolone acetonide 0.025% (KENALOG) 0.025 % cream Apply topically 2 (two) times daily. Apply to the affected area sparingly as needed twice a day.  Qty: 15 g, Refills: 1    Associated Diagnoses: Rash of hands                 Discharge Diagnosis: Lumbar spondylosis [M47.816]  Condition on Discharge: Stable with no complications to procedure   Diet on Discharge: Same as before.  Activity: as per instruction sheet.  Discharge to: Home with a responsible adult.  Follow up: 2-4 weeks

## 2022-02-17 NOTE — OP NOTE
Therapeutic Lumbar Medial Branch Radiofrequency Ablation under Fluoroscopy     The procedure, risks, benefits, and options were discussed with the patient. There are no contraindications to the procedure. The patent expressed understanding and agreed to the procedure. Informed written consent was obtained prior to the start of the procedure and can be found in the patient's chart.        PATIENT NAME: Raciel White   MRN: [id number]     DATE OF PROCEDURE: 02/17/2022     PROCEDURE:  Right L3, L4 and L5 Lumbar Radiofrequency Ablation under Fluoroscopy    PRE-OP DIAGNOSIS: Lumbar spondylosis [M47.816]    POST-OP DIAGNOSIS: Same    PHYSICIAN: Seamus Sotelo MD    ASSISTANTS: None     MEDICATIONS INJECTED:  Preservative-free Decadron 10mg with 9cc of Bupivicaine 0.25%    LOCAL ANESTHETIC INJECTED:   Xylocaine 2%    SEDATION:   Versed 2mg and Fentanyl 50mcg                                                                                                                                                                                     Conscious sedation ordered by M.D. Patient re-evaluation prior to administration of conscious sedation. No changes noted in patient's status from initial evaluation. The patient's vital signs were monitored by RN and patient remained hemodynamically stable throughout the procedure.    Event Time In   Sedation Start 1414   Sedation End 1428       ESTIMATED BLOOD LOSS:  None    COMPLICATIONS:  None     INTERVAL HISTORY: Patient has clinical and imaging findings suggestive of recurrent facet mediated pain. Patient has undergone a previous RFA at specified levels with at least 50% relief for at least 6 months. Successful diagnostic medial branch blocks have been completed within the past 2 years.    TECHNIQUE: Time-out was performed to identify the patient and procedure to be performed. With the patient laying in a prone position, the surgical area was prepped and draped in the usual sterile  fashion using ChloraPrep and fenestrated drape. The levels were determined under fluoroscopic guidance. Skin anesthesia was achieved by injecting Lidocaine 2% over the injection sites. A 20 gauge 10mm curved active tip needle was introduced to the anatomic local of the medial branch at each of the above levels using AP, lateral and/or contralateral oblique fluoroscopic imaging. Then sensory and motor testing was performed to confirm that the needle tips were in the correct location. After negative aspiration for blood or CSF was confirmed, 1 mL of the lidocaine 2% listed above was injected slowly at each site. This was followed by thermal lesioning at 80 degrees celsius for 90 seconds. That was followed by slowly injecting 3 mL of the medication mixture listed above at each site. The needles were removed and bleeding was nil. A sterile dressing was applied. No specimens collected. The patient tolerated the procedure well and did not have any procedure related motor deficit at the conclusion of the procedure.    The patient was monitored after the procedure in the recovery area. They were given post-procedure and discharge instructions to follow at home. The patient was discharged in a stable condition.    I reviewed and edited the fellow's note. I conducted my own interview and physical examination. I agree with the findings. I was present and supervising all critical portions of the procedure.    Seamus Sotelo MD

## 2022-02-17 NOTE — DISCHARGE INSTRUCTIONS
Thank you for allowing us to care for you today. You may receive a survey about the care we provided. Your feedback is valuable and helps us provide excellent care throughout the community.     Home Care Instructions for Pain Management:    1. DIET:   You may resume your normal diet today.   2. BATHING:   You may shower with luke warm water. No tub baths or anything that will soak injection sites under water for the next 24 hours.  3. DRESSING:   You may remove your bandage today.   4. ACTIVITY LEVEL:   You may resume your normal activities 24 hrs after your procedure. Nothing strenuous today.  5. MEDICATIONS:   You may resume your normal medications today. To restart blood thinners, ask your doctor.  6. DRIVING    If you have received any sedatives by mouth today, you may not drive for 12 hours.    If you have received any sedation through your IV, you may not drive for 24 hrs.   7. SPECIAL INSTRUCTIONS:   No heat to the injection site for 24 hrs including, hot bath or shower, heating pad, moist heat, or hot tubs.    Use ice pack to injection site for any pain or discomfort.  Apply ice packs for 20 minute intervals as needed.    IF you have diabetes, be sure to monitor your blood sugar more closely. IF your injection contained steroids your blood sugar levels may become higher than normal.    If you are still having pain upon discharge:  Your pain may improve over the next 48 hours. The anesthetic (numbing medication) works immediately to 48 hours. IF your injection contained a steroid (anti-inflammatory medication), it takes approximately 3 days to start feeling relief and 7-10 days to see your greatest results from the medication. It is possible you may need subsequent injections. This would be discussed at your follow up appointment with pain management or your referring doctor.    Please call the PAIN MANAGEMENT office at [phone] or ON CALL pager at [phone] if you experienced any:   -Weakness or  loss of sensation  -Fever > 101.5  -Pain uncontrolled with oral medications   -Persistent nausea, vomiting, or diarrhea  -Redness or drainage from the injection sites, or any other worrisome concerns.   If physician on call was not reached or could not communicate with our office for any reason please go to the nearest emergency department.  Adult Procedural Sedation Instructions    Recovery After Procedural Sedation (Adult)  You have been given medicine by vein to make you sleep during your surgery. This may have included both a pain medicine and sleeping medicine. Most of the effects have worn off. But you may still have some drowsiness for the next 6 to 8 hours.  Home care  Follow these guidelines when you get home:  · For the next 8 hours, you should be watched by a responsible adult. This person should make sure your condition is not getting worse.  · Don't drink any alcohol for the next 24 hours.  · Don't drive, operate dangerous machinery, or make important business or personal decisions during the next 24 hours.  Note: Your healthcare provider may tell you not to take any medicine by mouth for pain or sleep in the next 4 hours. These medicines may react with the medicines you were given in the hospital. This could cause a much stronger response than usual.  Follow-up care  Follow up with your healthcare provider if you are not alert and back to your usual level of activity within 12 hours.  When to seek medical advice  Call your healthcare provider right away if any of these occur:  · Drowsiness gets worse  · Weakness or dizziness gets worse  · Repeated vomiting  · You can't be awakened   Date Last Reviewed: 10/18/2016  © 7432-8762 [employer]. [address] [place] [address], [place] [address]. All rights reserved. This information is not intended as a substitute for professional medical care. Always follow your healthcare professional's instructions.

## 2022-02-21 ENCOUNTER — PATIENT MESSAGE (OUTPATIENT)
Dept: FAMILY MEDICINE | Facility: CLINIC | Age: 52
End: 2022-02-21
Payer: OTHER GOVERNMENT

## 2022-02-24 ENCOUNTER — PATIENT MESSAGE (OUTPATIENT)
Dept: PAIN MEDICINE | Facility: OTHER | Age: 52
End: 2022-02-24
Payer: OTHER GOVERNMENT

## 2022-03-10 ENCOUNTER — TELEPHONE (OUTPATIENT)
Dept: PAIN MEDICINE | Facility: CLINIC | Age: 52
End: 2022-03-10
Payer: OTHER GOVERNMENT

## 2022-03-10 NOTE — TELEPHONE ENCOUNTER
----- Message from Yuli Patel sent at 3/10/2022 10:44 AM CST -----  Regarding: Requesting a call  Contact: ARIADNE PERDOMO [[id number]]  Name of Who is Calling:  ARIADNE PERDOMO [[id number]]        What is the request in detail: Pt wants me to reschedule procedure for tomorrow , he is requesting a call back. Please advise           Can the clinic reply by [facility]: No          What Number to Call Back if not in [facility]:[phone]

## 2022-03-30 ENCOUNTER — TELEPHONE (OUTPATIENT)
Dept: PAIN MEDICINE | Facility: OTHER | Age: 52
End: 2022-03-30
Payer: OTHER GOVERNMENT

## 2022-04-14 ENCOUNTER — HOSPITAL ENCOUNTER (OUTPATIENT)
Facility: OTHER | Age: 52
Discharge: HOME | End: 2022-04-14
Attending: ANESTHESIOLOGY | Admitting: ANESTHESIOLOGY
Payer: OTHER GOVERNMENT

## 2022-04-14 VITALS
RESPIRATION RATE: 18 BRPM | OXYGEN SATURATION: 97 % | DIASTOLIC BLOOD PRESSURE: 84 MMHG | BODY MASS INDEX: 30.48 KG/M2 | TEMPERATURE: 98 F | HEART RATE: 72 BPM | WEIGHT: 225 LBS | HEIGHT: 72 IN | SYSTOLIC BLOOD PRESSURE: 129 MMHG

## 2022-04-14 DIAGNOSIS — G89.29 CHRONIC PAIN: ICD-10-CM

## 2022-04-14 DIAGNOSIS — M50.30 DDD (DEGENERATIVE DISC DISEASE), CERVICAL: Primary | ICD-10-CM

## 2022-04-14 PROCEDURE — 25500020 PHARM REV CODE 255: Performed by: ANESTHESIOLOGY

## 2022-04-14 PROCEDURE — 62321 NJX INTERLAMINAR CRV/THRC: CPT | Performed by: ANESTHESIOLOGY

## 2022-04-14 PROCEDURE — 62321 PR INJ CERV/THORAC, W/GUIDANCE: ICD-10-PCS | Mod: ,,, | Performed by: ANESTHESIOLOGY

## 2022-04-14 PROCEDURE — 63600175 PHARM REV CODE 636 W HCPCS: Performed by: ANESTHESIOLOGY

## 2022-04-14 PROCEDURE — 62321 NJX INTERLAMINAR CRV/THRC: CPT | Mod: ,,, | Performed by: ANESTHESIOLOGY

## 2022-04-14 PROCEDURE — 25000003 PHARM REV CODE 250: Performed by: ANESTHESIOLOGY

## 2022-04-14 RX ORDER — LIDOCAINE HYDROCHLORIDE 20 MG/ML
INJECTION, SOLUTION INFILTRATION; PERINEURAL
Status: DISCONTINUED | OUTPATIENT
Start: 2022-04-14 | End: 2022-04-14 | Stop reason: HOSPADM

## 2022-04-14 RX ORDER — MIDAZOLAM HYDROCHLORIDE 1 MG/ML
INJECTION INTRAMUSCULAR; INTRAVENOUS
Status: DISCONTINUED | OUTPATIENT
Start: 2022-04-14 | End: 2022-04-14 | Stop reason: HOSPADM

## 2022-04-14 RX ORDER — SODIUM CHLORIDE 9 MG/ML
500 INJECTION, SOLUTION INTRAVENOUS CONTINUOUS
Status: ACTIVE | OUTPATIENT
Start: 2022-04-14

## 2022-04-14 RX ORDER — LIDOCAINE HYDROCHLORIDE 10 MG/ML
INJECTION, SOLUTION EPIDURAL; INFILTRATION; INTRACAUDAL; PERINEURAL
Status: DISCONTINUED | OUTPATIENT
Start: 2022-04-14 | End: 2022-04-14 | Stop reason: HOSPADM

## 2022-04-14 RX ORDER — DEXAMETHASONE SODIUM PHOSPHATE 10 MG/ML
INJECTION INTRAMUSCULAR; INTRAVENOUS
Status: DISCONTINUED | OUTPATIENT
Start: 2022-04-14 | End: 2022-04-14 | Stop reason: HOSPADM

## 2022-04-14 NOTE — OP NOTE
Cervical Interlaminar Epidural Steroid Injection under Fluoroscopic Guidance    The procedure, risks, benefits, and options were discussed with the patient. There are no contraindications to the procedure. The patent expressed understanding and agreed to the procedure. Informed written consent was obtained prior to the start of the procedure and can be found in the patient's chart.     PATIENT NAME: Raciel White   MRN: [id number]     DATE OF PROCEDURE: 04/14/2022    PROCEDURE: Cervical Interlaminar Epidural Steroid Injection C7/T1 under Fluoroscopic Guidance    PRE-OP DIAGNOSIS: Cervical radiculopathy [M54.12] Cervical radiculopathy [M54.12]    POST-OP DIAGNOSIS: Same    PHYSICIAN: Seamus Sotelo MD     ASSISTANTS: Dr. Alicea    MEDICATIONS INJECTED: Preservative-free Decadron 10mg with 1cc of Lidocaine 1% MPF and preservative free normal saline    LOCAL ANESTHETIC INJECTED: Xylocaine 2%     SEDATION:   Versed 2mg and Fentanyl None                                                                                                                                                                                     Conscious sedation ordered by M.D. Patient re-evaluation prior to administration of conscious sedation. No changes noted in patient's status from initial evaluation. The patient's vital signs were monitored by RN and patient remained hemodynamically stable throughout the procedure.    Event Time In   Sedation Start 1022   Sedation End 1027       ESTIMATED BLOOD LOSS: None    COMPLICATIONS: None    TECHNIQUE: Time-out was performed to identify the patient and procedure to be performed. With the patient laying in a prone position, the surgical area was prepped and draped in the usual sterile fashion using ChloraPrep and a fenestrated drape. The level was determined under fluoroscopy guidance. Skin anesthesia was achieved by injecting Lidocaine 2% over the injection site.  The interlaminar space was then approached  with a 20 gauge, 3.5 inch Tuohy needle that was introduced under fluoroscopic guidance with AP, lateral and/or contralateral oblique imaging. Once the Ligamentum flavum was encountered loss of resistance to saline was used to enter the epidural space. With positive loss of resistance and negative aspiration for CSF or Blood, contrast dye Omnipaque (300mg/ml) was injected to confirm placement and there was no vascular runoff. Then 2 mL of the medication mixture listed above was then injected slowly. Displacement of the radio opaque contrast after injection of the medication confirmed that the medication went into the area of the epidural space. The needles were removed, and bleeding was nil. A sterile dressing was applied. No specimens collected. The patient tolerated the procedure well.       The patient was monitored after the procedure in the recovery area. They were given post-procedure and discharge instructions to follow at home. The patient was discharged in a stable condition.    I reviewed and edited the fellow's note. I conducted my own interview and physical examination. I agree with the findings. I was present and supervising all critical portions of the procedure.    Seamus Sotelo MD

## 2022-04-14 NOTE — H&P
HPI  Patient presenting for Procedure(s) (LRB):  Injection, Steroid, Epidural C7/T1 SCHEDULE 1 WEEK AFTER RFA PER JENNIFER MAHONEY (N/A)     Patient on Anti-coagulation No    No health changes since previous encounter    Past Medical History:   Diagnosis Date    Arthritis     GERD (gastroesophageal reflux disease)     Insomnia     Sleep apnea     cpap     Past Surgical History:   Procedure Laterality Date    COLONOSCOPY  2011    EPIDURAL STEROID INJECTION N/A 4/9/2021    Procedure: INJECTION, STEROID, EPIDURAL, C7-T1;  Surgeon: Seamus Sotelo MD;  Location: [facility]IN [facility]GT;  Service: Pain Management;  Laterality: N/A;    EPIDURAL STEROID INJECTION INTO LUMBAR SPINE N/A 12/13/2019    Procedure: Injection-steroid-epidural-lumbar;  Surgeon: Juan Carlos Romeo MD;  Location: [facility] OR;  Service: Pain Management;  Laterality: N/A;  L5/S1 L>R    EPIDURAL STEROID INJECTION INTO LUMBAR SPINE N/A 1/27/2020    Procedure: Injection-steroid-epidural-lumbar L5/S1;  Surgeon: Juan Carlos Romeo MD;  Location: [facility] OR;  Service: Pain Management;  Laterality: N/A;    INJECTION OF ANESTHETIC AGENT AROUND MEDIAL BRANCH NERVES INNERVATING LUMBAR FACET JOINT Bilateral 9/25/2020    Procedure: Block-nerve-medial branch-lumbar L3, L4, L5;  Surgeon: Juan Carlos Romeo MD;  Location: [facility] OR;  Service: Pain Management;  Laterality: Bilateral;    INJECTION OF ANESTHETIC AGENT AROUND MEDIAL BRANCH NERVES INNERVATING LUMBAR FACET JOINT Bilateral 10/20/2020    Procedure: Block-nerve-medial branch-lumbar, L3, 4, 5;  Surgeon: Juan Carlos Romeo MD;  Location: [facility] OR;  Service: Pain Management;  Laterality: Bilateral;    LARYNGOSCOPY Bilateral 11/2/2018    Procedure: Sleep endoscopy;  Surgeon: Brandin Rosen MD;  Location: [facility] OR;  Service: ENT;  Laterality: Bilateral;    MYOTOMY AND SUSPENSION OF HYOID Bilateral 9/27/2018    Procedure: Hyoid suspension;  Surgeon: Brandin Rosen MD;  Location: [facility] OR;  Service: ENT;   Laterality: Bilateral;    RADIOFREQUENCY ABLATION Right 2/5/2021    Procedure: RADIOFREQUENCY ABLATION RIGHT L3,4.5 1 of 2;  Surgeon: Seamus Sotelo MD;  Location: [facility];  Service: Pain Management;  Laterality: Right;    RADIOFREQUENCY ABLATION Left 2/19/2021    Procedure: RADIOFREQUENCY ABLATION LEFT L3,4,5 2 of 2;  Surgeon: Seamus Sotelo MD;  Location: [facility]IN [facility];  Service: Pain Management;  Laterality: Left;    RADIOFREQUENCY ABLATION Left 2/10/2022    Procedure: Radiofrequency Ablation LEFT L3,4,5;  Surgeon: Seamus Sotelo MD;  Location: [facility]IN [facility]GT;  Service: Pain Management;  Laterality: Left;    RADIOFREQUENCY ABLATION Right 2/17/2022    Procedure: Radiofrequency Ablation RIGHT L3,4,5 SCHEDULE 1 WEEK AFTER LEFT SIDE PER JENNIFER VERGARA;  Surgeon: Seamus Sotelo MD;  Location: [facility]IN [facility]GT;  Service: Pain Management;  Laterality: Right;    SPINE SURGERY  2008    Neck fusion C6/7??     Review of patient's allergies indicates:  No Known Allergies   No current facility-administered medications for this encounter.     Facility-Administered Medications Ordered in Other Encounters   Medication    lactated ringers infusion       PMHx, PSHx, Allergies, Medications reviewed in epic    ROS negative except pain complaints in HPI    OBJECTIVE:    There were no vitals taken for this visit.    PHYSICAL EXAMINATION:    GENERAL: Well appearing, in no acute distress, alert and oriented x3.  PSYCH:  Mood and affect appropriate.  SKIN: Skin color, texture, turgor normal, no rashes or lesions which will impact the procedure.  CV: RRR with palpation of the radial artery.  PULM: No evidence of respiratory difficulty, symmetric chest rise. Clear to auscultation.  NEURO: Cranial nerves grossly intact.    Plan:    Proceed with procedure as planned Procedure(s) (LRB):  Injection, Steroid, Epidural C7/T1 SCHEDULE 1 WEEK AFTER RFA PER JENNIFER MAHONEY (N/A)    Nagi Chambers  04/14/2022

## 2022-04-14 NOTE — DISCHARGE SUMMARY
Discharge Note  Short Stay      SUMMARY     Admit Date: 4/14/2022    Attending Physician: Seaums Sotelo      Discharge Physician: Seamus Sotelo      Discharge Date: 4/14/2022 10:28 AM    Procedure(s) (LRB):  Injection, Steroid, Epidural C7/T1 SCHEDULE 1 WEEK AFTER RFA PER JENNIFER MAHONEY (N/A)    Final Diagnosis: Cervical radiculopathy [M54.12]    Disposition: Home or self care    Patient Instructions:   Current Discharge Medication List      CONTINUE these medications which have NOT CHANGED    Details   acetaminophen (TYLENOL) 325 MG tablet Take 2 tablets (650 mg total) by mouth every 4 (four) hours as needed.  Refills: 0      amitriptyline (ELAVIL) 10 MG tablet TAKE 1 TABLET NIGHTLY AS NEEDED  Qty: 90 tablet, Refills: 3    Associated Diagnoses: Mild anxiety      fluticasone (FLONASE) 50 mcg/actuation nasal spray 1 spray by Each Nare route once daily.  Qty: 1 Bottle, Refills: 4    Associated Diagnoses: Acute seasonal allergic rhinitis, unspecified trigger      lidocaine (LIDODERM) 5 % Place 1 patch onto the skin once daily. Remove & Discard patch within 12 hours or as directed by MD  Qty: 30 patch, Refills: 3      loratadine (CLARITIN) 10 mg tablet Take 10 mg by mouth once daily.      multivitamin capsule Take 1 capsule by mouth once daily.      NEXIUM 20 mg capsule TAKE 1 CAPSULE DAILY  Qty: 90 capsule, Refills: 3    Associated Diagnoses: Gastroesophageal reflux disease without esophagitis      triamcinolone acetonide 0.025% (KENALOG) 0.025 % cream Apply topically 2 (two) times daily. Apply to the affected area sparingly as needed twice a day.  Qty: 15 g, Refills: 1    Associated Diagnoses: Rash of hands                 Discharge Diagnosis: Cervical radiculopathy [M54.12]  Condition on Discharge: Stable with no complications to procedure   Diet on Discharge: Same as before.  Activity: as per instruction sheet.  Discharge to: Home with a responsible adult.  Follow up: 2-4 weeks

## 2022-04-14 NOTE — PLAN OF CARE
Pt tolerated procedure well. Pt reports 0/10 pain after procedure. Spouse at bedside.  Assisted pt up for first time. Steady on feet. All discharge instructions given.

## 2022-04-14 NOTE — DISCHARGE INSTRUCTIONS
Thank you for allowing us to care for you today. You may receive a survey about the care we provided. Your feedback is valuable and helps us provide excellent care throughout the community.     Home Care Instructions for Pain Management:    1. DIET:   You may resume your normal diet today.   2. BATHING:   You may shower with luke warm water. No tub baths or anything that will soak injection sites under water for the next 24 hours.  3. DRESSING:   You may remove your bandage today.   4. ACTIVITY LEVEL:   You may resume your normal activities 24 hrs after your procedure. Nothing strenuous today.  5. MEDICATIONS:   You may resume your normal medications today. To restart blood thinners, ask your doctor.  6. DRIVING    If you have received any sedatives by mouth today, you may not drive for 12 hours.    If you have received any sedation through your IV, you may not drive for 24 hrs.   7. SPECIAL INSTRUCTIONS:   No heat to the injection site for 24 hrs including, hot bath or shower, heating pad, moist heat, or hot tubs.    Use ice pack to injection site for any pain or discomfort.  Apply ice packs for 20 minute intervals as needed.    IF you have diabetes, be sure to monitor your blood sugar more closely. IF your injection contained steroids your blood sugar levels may become higher than normal.    If you are still having pain upon discharge:  Your pain may improve over the next 48 hours. The anesthetic (numbing medication) works immediately to 48 hours. IF your injection contained a steroid (anti-inflammatory medication), it takes approximately 3 days to start feeling relief and 7-10 days to see your greatest results from the medication. It is possible you may need subsequent injections. This would be discussed at your follow up appointment with pain management or your referring doctor.    Please call the PAIN MANAGEMENT office at [phone] or ON CALL pager at [phone] if you experienced any:   -Weakness or  loss of sensation  -Fever > 101.5  -Pain uncontrolled with oral medications   -Persistent nausea, vomiting, or diarrhea  -Redness or drainage from the injection sites, or any other worrisome concerns.   If physician on call was not reached or could not communicate with our office for any reason please go to the nearest emergency department.     Adult Procedural Sedation Instructions    Recovery After Procedural Sedation (Adult)  You have been given medicine by vein to make you sleep during your surgery. This may have included both a pain medicine and sleeping medicine. Most of the effects have worn off. But you may still have some drowsiness for the next 6 to 8 hours.  Home care  Follow these guidelines when you get home:  For the next 8 hours, you should be watched by a responsible adult. This person should make sure your condition is not getting worse.  Don't drink any alcohol for the next 24 hours.  Don't drive, operate dangerous machinery, or make important business or personal decisions during the next 24 hours.  Note: Your healthcare provider may tell you not to take any medicine by mouth for pain or sleep in the next 4 hours. These medicines may react with the medicines you were given in the hospital. This could cause a much stronger response than usual.  Follow-up care  Follow up with your healthcare provider if you are not alert and back to your usual level of activity within 12 hours.  When to seek medical advice  Call your healthcare provider right away if any of these occur:  Drowsiness gets worse  Weakness or dizziness gets worse  Repeated vomiting  You can't be awakened   Date Last Reviewed: 10/18/2016  © 6857-7813 [employer]. [address] [place] [address], [place] [address]. All rights reserved. This information is not intended as a substitute for professional medical care. Always follow your healthcare professional's instructions.

## 2022-04-21 ENCOUNTER — OFFICE VISIT (OUTPATIENT)
Dept: SLEEP MEDICINE | Facility: CLINIC | Age: 52
End: 2022-04-21
Payer: OTHER GOVERNMENT

## 2022-04-21 DIAGNOSIS — G89.29 OTHER CHRONIC PAIN: Primary | ICD-10-CM

## 2022-04-21 DIAGNOSIS — G47.33 OSA (OBSTRUCTIVE SLEEP APNEA): ICD-10-CM

## 2022-04-21 PROCEDURE — 99203 PR OFFICE/OUTPT VISIT, NEW, LEVL III, 30-44 MIN: ICD-10-PCS | Mod: 95,,, | Performed by: INTERNAL MEDICINE

## 2022-04-21 PROCEDURE — 99203 OFFICE O/P NEW LOW 30 MIN: CPT | Mod: 95,,, | Performed by: INTERNAL MEDICINE

## 2022-04-21 NOTE — PROGRESS NOTES
Subjective:       Patient ID: Raciel White is a 51 y.o. male.    Chief Complaint: Sleeping Problem    I had the pleasure of seeing Raciel White today, who is a 51 y.o. male that presents with ROMA.    Raciel White does not have a CDL.    Raciel White is not a shift worker.    Raciel White presents with ROMA that has been going on for 13 years   PSG 2009 showed AHI 11.1.   CPAP titration 2009 and 10 cm h20 recommended.   AHI at that setting not provided.   Feels more rested with CPAP but wants to explore other options   Current CPAP is under recall.    Bedtime when working ranges from 2130 to 2200.   When not working, bedtime ranges from 2200 to 2300.   Sleep latency ranges from 10 to 20 minutes.     Average number of awakenings is 3-4 and return to sleep is quick.   Wake up time when working is 0600 to 060.   When not working, wake up time is 0700 to 0730.   Patient does feel rested upon awakening.    Raciel White consumes approximately 1 beverages with caffeine daily.   An average of 2 beverages with alcohol are consumed weekly   Medications taken for sleep currently: none  Previous medications taken: none     Raciel White does experience daytime sleepiness.   Naps are taken about 1 times weekly, usually lasting 60 to 120 minutes.  Raciel currently does operate an automobile.  Raciel White does experience drowsiness when driving.   Patient does doze off when sedentary.   Raciel White does not have auxiliary symptoms of narcolepsy including sleep onset paralysis, hypnagogic hallucinations, sleep attacks and cataplexy  ESS 11.    Raciel White has a history of snoring.   Snoring is described as moderate and constant.   Apneic episodes have been noticed during sleep.   A witness to sleep is present.   The patient awakens with mouth dryness.      Raciel White does not have symptoms of Restless Legs Syndrome. Nocturnal leg movements have not been noticed.   The patient does not experience sleep related leg  cramps.   There is not a history of parasomnia.      Current Outpatient Medications:     acetaminophen (TYLENOL) 325 MG tablet, Take 2 tablets (650 mg total) by mouth every 4 (four) hours as needed., Disp: , Rfl: 0    amitriptyline (ELAVIL) 10 MG tablet, TAKE 1 TABLET NIGHTLY AS NEEDED, Disp: 90 tablet, Rfl: 3    lidocaine (LIDODERM) 5 %, Place 1 patch onto the skin once daily. Remove & Discard patch within 12 hours or as directed by MD, Disp: 30 patch, Rfl: 3    loratadine (CLARITIN) 10 mg tablet, Take 10 mg by mouth once daily., Disp: , Rfl:     multivitamin capsule, Take 1 capsule by mouth once daily., Disp: , Rfl:     NEXIUM 20 mg capsule, TAKE 1 CAPSULE DAILY, Disp: 90 capsule, Rfl: 3  No current facility-administered medications for this visit.    Facility-Administered Medications Ordered in Other Visits:     0.9%  NaCl infusion, 500 mL, Intravenous, Continuous, Nagi Chambers MD    lactated ringers infusion, , Intravenous, Continuous, Jaime Valdez MD, Stopped at 11/02/18 0906     Review of patient's allergies indicates:  No Known Allergies      Past Medical History:   Diagnosis Date    Arthritis     GERD (gastroesophageal reflux disease)     Insomnia     Sleep apnea     cpap       Past Surgical History:   Procedure Laterality Date    COLONOSCOPY  2011    EPIDURAL STEROID INJECTION N/A 4/9/2021    Procedure: INJECTION, STEROID, EPIDURAL, C7-T1;  Surgeon: Seamus Sotelo MD;  [facility];  Service: Pain Management;  Laterality: N/A;    EPIDURAL STEROID INJECTION N/A 4/14/2022    Procedure: Injection, Steroid, Epidural C7/T1 SCHEDULE 1 WEEK AFTER RFA PER JENNIFER MAHONEY;  Surgeon: Seamus Sotelo MD;  Location: [facility] PAIN T;  Service: Pain Management;  Laterality: N/A;    EPIDURAL STEROID INJECTION INTO LUMBAR SPINE N/A 12/13/2019    Procedure: Injection-steroid-epidural-lumbar;  Surgeon: Juan Carlos Romeo MD;  Location: [facility];  Service: Pain Management;   Laterality: N/A;  L5/S1 L>R    EPIDURAL STEROID INJECTION INTO LUMBAR SPINE N/A 1/27/2020    Procedure: Injection-steroid-epidural-lumbar L5/S1;  Surgeon: Juan Carlos Romeo MD;  Location: [facility] OR;  Service: Pain Management;  Laterality: N/A;    INJECTION OF ANESTHETIC AGENT AROUND MEDIAL BRANCH NERVES INNERVATING LUMBAR FACET JOINT Bilateral 9/25/2020    Procedure: Block-nerve-medial branch-lumbar L3, L4, L5;  Surgeon: Juan Carlos Romeo MD;  Location: [facility] OR;  Service: Pain Management;  Laterality: Bilateral;    INJECTION OF ANESTHETIC AGENT AROUND MEDIAL BRANCH NERVES INNERVATING LUMBAR FACET JOINT Bilateral 10/20/2020    Procedure: Block-nerve-medial branch-lumbar, L3, 4, 5;  Surgeon: Juan Carlos Romeo MD;  Location: [facility] OR;  Service: Pain Management;  Laterality: Bilateral;    LARYNGOSCOPY Bilateral 11/2/2018    Procedure: Sleep endoscopy;  Surgeon: Brandin Rosen MD;  Location: [facility] OR;  Service: ENT;  Laterality: Bilateral;    MYOTOMY AND SUSPENSION OF HYOID Bilateral 9/27/2018    Procedure: Hyoid suspension;  Surgeon: Brandin Rosen MD;  Location: [facility] OR;  Service: ENT;  Laterality: Bilateral;    RADIOFREQUENCY ABLATION Right 2/5/2021    Procedure: RADIOFREQUENCY ABLATION RIGHT L3,4.5 1 of 2;  Surgeon: Seamus Sotelo MD;  Location: [facility] [place] [facility]IN MGT;  Service: Pain Management;  Laterality: Right;    RADIOFREQUENCY ABLATION Left 2/19/2021    Procedure: RADIOFREQUENCY ABLATION LEFT L3,4,5 2 of 2;  Surgeon: Seamus Sotelo MD;  Location: [facility] [place] [facility]IN [facility]GT;  Service: Pain Management;  Laterality: Left;    RADIOFREQUENCY ABLATION Left 2/10/2022    Procedure: Radiofrequency Ablation LEFT L3,4,5;  Surgeon: Seamus Sotelo MD;  Location: [facility] [place] [facility]IN MGT;  Service: Pain Management;  Laterality: Left;    RADIOFREQUENCY ABLATION Right 2/17/2022    Procedure: Radiofrequency Ablation RIGHT L3,4,5 SCHEDULE 1 WEEK AFTER LEFT SIDE PER JENNIFER VERGARA;  Surgeon: Saemus Sotelo MD;  Location: [facility] [place] [facility]IN  MGT;  Service: Pain Management;  Laterality: Right;    SPINE SURGERY  2008    Neck fusion C6/7??       Family History   Problem Relation Age of Onset    Hypertension Paternal Grandfather     Diabetes Neg Hx     Cancer Neg Hx     Glaucoma Neg Hx     Macular degeneration Neg Hx     Retinal detachment Neg Hx        Social History     Socioeconomic History    Marital status:    Tobacco Use    Smoking status: Former Smoker     Packs/day: 0.50     Types: Vaping with nicotine    Smokeless tobacco: Former User   Substance and Sexual Activity    Alcohol use: Yes     Alcohol/week: 12.0 standard drinks     Types: 12 Cans of beer per week     Comment: weekly    Drug use: No   Social History Narrative    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            Old medical records.    There were no vitals filed for this visit.           The patient was given open opportunity to ask questions and/or express concerns about treatment plan.   All questions/concerns were discussed.   Driving precautions were provided.     Two patient identifiers used prior to evaluation.    Thank you for referring Raciel White for evaluation.           Past Medical History:   Diagnosis Date    Arthritis     GERD (gastroesophageal reflux disease)     Insomnia     Sleep apnea     cpap     Past Surgical History:   Procedure Laterality Date    COLONOSCOPY  2011    EPIDURAL STEROID INJECTION N/A 4/9/2021    Procedure: INJECTION, STEROID, EPIDURAL, C7-T1;  Surgeon: Seamus Sotelo MD;  Location: [facility] PAIN T;  Service: Pain Management;  Laterality: N/A;    EPIDURAL STEROID INJECTION N/A 4/14/2022    Procedure: Injection, Steroid, Epidural C7/T1 SCHEDULE 1 WEEK AFTER RFA PER JENNIFER MAHONEY;  Surgeon: Seamus Sotelo MD;  Location: [facility] PAIN MGT;  Service: Pain Management;  Laterality: N/A;    EPIDURAL STEROID INJECTION INTO LUMBAR SPINE N/A 12/13/2019    Procedure: Injection-steroid-epidural-lumbar;  Surgeon: Juan Carlos CARTY  MD Ousmane;  Location: [facility] OR;  Service: Pain Management;  Laterality: N/A;  L5/S1 L>R    EPIDURAL STEROID INJECTION INTO LUMBAR SPINE N/A 1/27/2020    Procedure: Injection-steroid-epidural-lumbar L5/S1;  Surgeon: Juan Carlos Romeo MD;  Location: [facility] OR;  Service: Pain Management;  Laterality: N/A;    INJECTION OF ANESTHETIC AGENT AROUND MEDIAL BRANCH NERVES INNERVATING LUMBAR FACET JOINT Bilateral 9/25/2020    Procedure: Block-nerve-medial branch-lumbar L3, L4, L5;  Surgeon: Juan Carlos Romeo MD;  Location: [facility] OR;  Service: Pain Management;  Laterality: Bilateral;    INJECTION OF ANESTHETIC AGENT AROUND MEDIAL BRANCH NERVES INNERVATING LUMBAR FACET JOINT Bilateral 10/20/2020    Procedure: Block-nerve-medial branch-lumbar, L3, 4, 5;  Surgeon: Juan Carlos Romeo MD;  Location: [facility] OR;  Service: Pain Management;  Laterality: Bilateral;    LARYNGOSCOPY Bilateral 11/2/2018    Procedure: Sleep endoscopy;  Surgeon: Brnadin Rosen MD;  Location: [facility] OR;  Service: ENT;  Laterality: Bilateral;    MYOTOMY AND SUSPENSION OF HYOID Bilateral 9/27/2018    Procedure: Hyoid suspension;  Surgeon: Brandin Rosen MD;  Location: [facility] OR;  Service: ENT;  Laterality: Bilateral;    RADIOFREQUENCY ABLATION Right 2/5/2021    Procedure: RADIOFREQUENCY ABLATION RIGHT L3,4.5 1 of 2;  Surgeon: Seamus Sotelo MD;  Location: [facility] PAIN MGT;  Service: Pain Management;  Laterality: Right;    RADIOFREQUENCY ABLATION Left 2/19/2021    Procedure: RADIOFREQUENCY ABLATION LEFT L3,4,5 2 of 2;  Surgeon: Seamus Sotelo MD;  Location: [facility] PAIN MGT;  Service: Pain Management;  Laterality: Left;    RADIOFREQUENCY ABLATION Left 2/10/2022    Procedure: Radiofrequency Ablation LEFT L3,4,5;  Surgeon: Seamus Sotelo MD;  Location: [facility] PAIN MGT;  Service: Pain Management;  Laterality: Left;    RADIOFREQUENCY ABLATION Right 2/17/2022    Procedure: Radiofrequency Ablation RIGHT L3,4,5 [facility]EDU[facility];  Surgeon: Seamus Sotelo MD;  Location: [facility];  Service: Pain Management;  Laterality: Right;    SPINE SURGERY  2008    Neck fusion C6/7??     Family History   Problem Relation Age of Onset    Hypertension Paternal Grandfather     Diabetes Neg Hx     Cancer Neg Hx     Glaucoma Neg Hx     Macular degeneration Neg Hx     Retinal detachment Neg Hx      Social History     Socioeconomic History    Marital status:    Tobacco Use    Smoking status: Former Smoker     Packs/day: 0.50     Types: Vaping with nicotine    Smokeless tobacco: Former User   Substance and Sexual Activity    Alcohol use: Yes     Alcohol/week: 12.0 standard drinks     Types: 12 Cans of beer per week     Comment: weekly    Drug use: No   Social History Narrative    Currently . 2 children. Work in [employer].        Current Outpatient Medications   Medication Sig Dispense Refill    acetaminophen (TYLENOL) 325 MG tablet Take 2 tablets (650 mg total) by mouth every 4 (four) hours as needed.  0    amitriptyline (ELAVIL) 10 MG tablet TAKE 1 TABLET NIGHTLY AS NEEDED 90 tablet 3    lidocaine (LIDODERM) 5 % Place 1 patch onto the skin once daily. Remove & Discard patch within 12 hours or as directed by MD 30 patch 3    loratadine (CLARITIN) 10 mg tablet Take 10 mg by mouth once daily.      multivitamin capsule Take 1 capsule by mouth once daily.      NEXIUM 20 mg capsule TAKE 1 CAPSULE DAILY 90 capsule 3     No current facility-administered medications for this visit.     Facility-Administered Medications Ordered in Other Visits   Medication Dose Route Frequency Provider Last Rate Last Admin    0.9%  NaCl infusion  500 mL Intravenous Continuous Nagi Chambers MD        lactated ringers infusion   Intravenous Continuous Jaime Valdez MD   Stopped at 11/02/18 0906     Review of patient's allergies indicates:  No Known Allergies    Review of Systems    Objective:      There were no vitals filed for this  visit.  Physical Exam    Lab Review:   CBC:   Lab Results   Component Value Date    WBC 6.66 11/08/2019    RBC 4.58 (L) 11/08/2019    HGB 14.3 11/08/2019    HCT 44.5 11/08/2019     11/08/2019     BMP:   Lab Results   Component Value Date     11/08/2019     11/08/2019    K 4.5 11/08/2019     11/08/2019    CO2 27 11/08/2019    BUN 22 (H) 11/08/2019    CREATININE 1.1 11/08/2019    CALCIUM 9.5 11/08/2019     Diagnostics Review: Echo: Reviewed     Assessment:       1. Other chronic pain    2. ROMA (obstructive sleep apnea)        Plan:       Due to listed symptoms, a polysomnogram is recommended and ordered.   Description of procedure given to patient.   If significant Obstructive Sleep Apnea (ROMA) is found during the initial portion of the study, therapy will be initiated with nasal Continuous Positive Airway Pressure (CPAP).   Goals of therapy were discussed, alternative treatments listed and patient agrees to this form of therapy if indicated.   The pathophysiology of ROMA was discussed.   The effects of ROMA on patient's co-morbid conditions and the increased morbidity and/or mortality associated with this condition were reviewed.   The patient was given open opportunity to ask questions and/or express concerns about treatment plan.   All questions/concerns were discussed.   Driving precautions were provided.       Thank you for referring Raciel White for evaluation.            The patient location is: home  The chief complaint leading to consultation is: ROMA    Visit type: audiovisual    Face to Face time with patient: 14  32 minutes of total time spent on the encounter, which includes face to face time and non-face to face time preparing to see the patient (eg, review of tests), Obtaining and/or reviewing separately obtained history, Documenting clinical information in the electronic or other health record, Independently interpreting results (not separately reported) and communicating  results to the patient/family/caregiver, or Care coordination (not separately reported).         Each patient to whom he or she provides medical services by telemedicine is:  (1) informed of the relationship between the physician and patient and the respective role of any other health care provider with respect to management of the patient; and (2) notified that he or she may decline to receive medical services by telemedicine and may withdraw from such care at any time.    Notes:

## 2022-05-05 ENCOUNTER — PATIENT MESSAGE (OUTPATIENT)
Dept: PAIN MEDICINE | Facility: CLINIC | Age: 52
End: 2022-05-05
Payer: OTHER GOVERNMENT

## 2022-05-06 ENCOUNTER — PATIENT MESSAGE (OUTPATIENT)
Dept: SLEEP MEDICINE | Facility: CLINIC | Age: 52
End: 2022-05-06
Payer: OTHER GOVERNMENT

## 2022-05-12 ENCOUNTER — PATIENT MESSAGE (OUTPATIENT)
Dept: SLEEP MEDICINE | Facility: CLINIC | Age: 52
End: 2022-05-12
Payer: OTHER GOVERNMENT

## 2022-05-12 DIAGNOSIS — G47.33 OSA (OBSTRUCTIVE SLEEP APNEA): Primary | ICD-10-CM

## 2022-05-18 ENCOUNTER — PATIENT MESSAGE (OUTPATIENT)
Dept: SLEEP MEDICINE | Facility: CLINIC | Age: 52
End: 2022-05-18
Payer: OTHER GOVERNMENT

## 2022-05-20 ENCOUNTER — PATIENT MESSAGE (OUTPATIENT)
Dept: PAIN MEDICINE | Facility: CLINIC | Age: 52
End: 2022-05-20
Payer: OTHER GOVERNMENT

## 2022-06-15 ENCOUNTER — PATIENT MESSAGE (OUTPATIENT)
Dept: PAIN MEDICINE | Facility: CLINIC | Age: 52
End: 2022-06-15
Payer: OTHER GOVERNMENT

## 2023-02-13 ENCOUNTER — PATIENT MESSAGE (OUTPATIENT)
Dept: ADMINISTRATIVE | Facility: HOSPITAL | Age: 53
End: 2023-02-13
Payer: OTHER GOVERNMENT

## 2023-02-13 ENCOUNTER — PATIENT OUTREACH (OUTPATIENT)
Dept: ADMINISTRATIVE | Facility: HOSPITAL | Age: 53
End: 2023-02-13
Payer: OTHER GOVERNMENT

## 2023-02-13 NOTE — PROGRESS NOTES
2023 Care Everywhere updates requested and reviewed.  Immunizations reconciled. Media reports reviewed.  Duplicate HM overrides and  orders removed.  Overdue HM topic chart audit and/or requested.  Overdue lab testing linked to upcoming lab appointments if applies.    Future Appointments   Date Time Provider Department Center   2023 10:00 AM Camron Wong MD [facility]       Health Maintenance Due   Topic Date Due    Hepatitis C Screening  Never done    HIV Screening  Never done    Hemoglobin A1c (Diabetic Prevention Screening)  Never done    Shingles Vaccine (1 of 2) Never done    COVID-19 Vaccine (4 - Booster for Pfizer series) 2022    Influenza Vaccine (1) 2022    TETANUS VACCINE  10/25/2022    Colorectal Cancer Screening  2022

## 2023-02-27 ENCOUNTER — OFFICE VISIT (OUTPATIENT)
Dept: FAMILY MEDICINE | Facility: CLINIC | Age: 53
End: 2023-02-27
Payer: OTHER GOVERNMENT

## 2023-02-27 VITALS
OXYGEN SATURATION: 95 % | HEIGHT: 72 IN | WEIGHT: 228.38 LBS | RESPIRATION RATE: 18 BRPM | DIASTOLIC BLOOD PRESSURE: 70 MMHG | BODY MASS INDEX: 30.93 KG/M2 | HEART RATE: 102 BPM | TEMPERATURE: 98 F | SYSTOLIC BLOOD PRESSURE: 122 MMHG

## 2023-02-27 DIAGNOSIS — K21.9 GASTROESOPHAGEAL REFLUX DISEASE, UNSPECIFIED WHETHER ESOPHAGITIS PRESENT: ICD-10-CM

## 2023-02-27 DIAGNOSIS — M51.36 DDD (DEGENERATIVE DISC DISEASE), LUMBAR: ICD-10-CM

## 2023-02-27 DIAGNOSIS — M50.30 DDD (DEGENERATIVE DISC DISEASE), CERVICAL: ICD-10-CM

## 2023-02-27 DIAGNOSIS — Z00.00 ROUTINE MEDICAL EXAM: Primary | ICD-10-CM

## 2023-02-27 DIAGNOSIS — M10.9 GOUT, UNSPECIFIED CAUSE, UNSPECIFIED CHRONICITY, UNSPECIFIED SITE: ICD-10-CM

## 2023-02-27 DIAGNOSIS — Z12.11 COLON CANCER SCREENING: ICD-10-CM

## 2023-02-27 DIAGNOSIS — G47.33 OSA ON CPAP: ICD-10-CM

## 2023-02-27 PROCEDURE — 99999 PR PBB SHADOW E&M-EST. PATIENT-LVL III: ICD-10-PCS | Mod: PBBFAC,,, | Performed by: FAMILY MEDICINE

## 2023-02-27 PROCEDURE — 99396 PREV VISIT EST AGE 40-64: CPT | Mod: S$PBB,,, | Performed by: FAMILY MEDICINE

## 2023-02-27 PROCEDURE — 99396 PR PREVENTIVE VISIT,EST,40-64: ICD-10-PCS | Mod: S$PBB,,, | Performed by: FAMILY MEDICINE

## 2023-02-27 PROCEDURE — 90714 TD VACC NO PRESV 7 YRS+ IM: CPT | Mod: PBBFAC,PO

## 2023-02-27 PROCEDURE — 99999 PR PBB SHADOW E&M-EST. PATIENT-LVL III: CPT | Mod: PBBFAC,,, | Performed by: FAMILY MEDICINE

## 2023-02-27 PROCEDURE — 99213 OFFICE O/P EST LOW 20 MIN: CPT | Mod: PBBFAC,PO | Performed by: FAMILY MEDICINE

## 2023-02-27 RX ORDER — ALLOPURINOL 100 MG/1
100 TABLET ORAL
COMMUNITY
Start: 2022-08-25

## 2023-02-27 RX ORDER — CYCLOBENZAPRINE HCL 10 MG
10 TABLET ORAL NIGHTLY PRN
Qty: 30 TABLET | Refills: 0
Start: 2023-02-27 | End: 2023-03-29

## 2023-02-27 NOTE — PROGRESS NOTES
Subjective:       Patient ID: Raciel White is a 52 y.o. male.    Chief Complaint: Annual Exam    HPI    Here for a check up    Axel on cpap    Seeing pain management, Dr. Ching, yearly. Takes flexeril prn for chronic neck and low back pain    Hx of gout. On allopurinol.  Now, gets gout infrequently.     Gerd-on nexium. Takes prn.     Recent er visit for chest pain. Negative cardiac w/u. Had mildly elevated lfts. Drinks etoh socially. Ct abdomen in 2012 showed mild fatty liver.     Review of Systems      Review of Systems   Constitutional: Negative for fever and chills.   HENT: Negative for hearing loss and neck stiffness.    Eyes: Negative for redness and itching.   Respiratory: Negative for cough and choking.    Cardiovascular: Negative for chest pain and leg swelling.  Abdomen: Negative for abdominal pain and blood in stool.   Genitourinary: Negative for dysuria and flank pain.   Musculoskeletal: Negative for back pain and gait problem.   Neurological: Negative for light-headedness and headaches.   Hematological: Negative for adenopathy.   Psychiatric/Behavioral: Negative for behavioral problems.     Objective:      Physical Exam  Constitutional:       Appearance: He is well-developed.   HENT:      Head: Normocephalic and atraumatic.   Eyes:      Conjunctiva/sclera: Conjunctivae normal.      Pupils: Pupils are equal, round, and reactive to light.   Cardiovascular:      Rate and Rhythm: Normal rate and regular rhythm.      Heart sounds: Normal heart sounds. No murmur heard.    No friction rub.   Pulmonary:      Effort: Pulmonary effort is normal.      Breath sounds: Normal breath sounds.   Abdominal:      General: Bowel sounds are normal.      Palpations: Abdomen is soft.      Tenderness: There is no abdominal tenderness.   Musculoskeletal:         General: No swelling.      Cervical back: Normal range of motion and neck supple.   Lymphadenopathy:      Cervical: No cervical adenopathy.   Skin:     General: Skin  is warm and dry.   Neurological:      Mental Status: He is alert and oriented to person, place, and time.      Cranial Nerves: No cranial nerve deficit.      Coordination: Coordination normal.      Deep Tendon Reflexes: Reflexes are normal and symmetric.   Psychiatric:         Behavior: Behavior normal.       Assessment:       1. Routine medical exam    2. Colon cancer screening    3. Gout, unspecified cause, unspecified chronicity, unspecified site    4. DDD (degenerative disc disease), cervical    5. DDD (degenerative disc disease), lumbar    6. ROMA on CPAP    7. Gastroesophageal reflux disease, unspecified whether esophagitis present        Plan:       Routine medical exam  -     Comprehensive Metabolic Panel; Future  -     Lipid Panel; Future  -     Hemoglobin A1C; Future    Colon cancer screening  -     Case Request Endoscopy: COLONOSCOPY    Gout, unspecified cause, unspecified chronicity, unspecified site    DDD (degenerative disc disease), cervical    DDD (degenerative disc disease), lumbar    ROMA on CPAP    Gastroesophageal reflux disease, unspecified whether esophagitis present    Other orders  -     cyclobenzaprine (FLEXERIL) 10 MG tablet; Take 1 tablet (10 mg total) by mouth nightly as needed for Muscle spasms.  Dispense: 30 tablet; Refill: 0  -     (In Office Administered) Td Vaccine - Preservative Free              Plan:  See orders  Td vaccine today  Cont current meds  Pending labs will determine f/u      Medication List with Changes/Refills   New Medications    CYCLOBENZAPRINE (FLEXERIL) 10 MG TABLET    Take 1 tablet (10 mg total) by mouth nightly as needed for Muscle spasms.   Current Medications    ACETAMINOPHEN (TYLENOL) 325 MG TABLET    Take 2 tablets (650 mg total) by mouth every 4 (four) hours as needed.    ALLOPURINOL (ZYLOPRIM) 100 MG TABLET    100 mg.    AMITRIPTYLINE (ELAVIL) 10 MG TABLET    TAKE 1 TABLET NIGHTLY AS NEEDED    LIDOCAINE (LIDODERM) 5 %    Place 1 patch onto the skin once  daily. Remove & Discard patch within 12 hours or as directed by MD    LORATADINE (CLARITIN) 10 MG TABLET    Take 10 mg by mouth once daily.    MULTIVITAMIN CAPSULE    Take 1 capsule by mouth once daily.    NEXIUM 20 MG CAPSULE    TAKE 1 CAPSULE DAILY

## 2023-03-02 ENCOUNTER — LAB VISIT (OUTPATIENT)
Dept: LAB | Facility: HOSPITAL | Age: 53
End: 2023-03-02
Attending: FAMILY MEDICINE
Payer: OTHER GOVERNMENT

## 2023-03-02 DIAGNOSIS — Z00.00 ROUTINE MEDICAL EXAM: ICD-10-CM

## 2023-03-02 LAB
ALBUMIN SERPL BCP-MCNC: 4.3 G/DL (ref 3.5–5.2)
ALP SERPL-CCNC: 58 U/L (ref 55–135)
ALT SERPL W/O P-5'-P-CCNC: 49 U/L (ref 10–44)
ANION GAP SERPL CALC-SCNC: 8 MMOL/L (ref 8–16)
AST SERPL-CCNC: 27 U/L (ref 10–40)
BILIRUB SERPL-MCNC: 0.6 MG/DL (ref 0.1–1)
BUN SERPL-MCNC: 21 MG/DL (ref 6–20)
CALCIUM SERPL-MCNC: 10.1 MG/DL (ref 8.7–10.5)
CHLORIDE SERPL-SCNC: 106 MMOL/L (ref 95–110)
CHOLEST SERPL-MCNC: 194 MG/DL (ref 120–199)
CHOLEST/HDLC SERPL: 2.9 {RATIO} (ref 2–5)
CO2 SERPL-SCNC: 27 MMOL/L (ref 23–29)
CREAT SERPL-MCNC: 0.9 MG/DL (ref 0.5–1.4)
EST. GFR  (NO RACE VARIABLE): >60 ML/MIN/1.73 M^2
ESTIMATED AVG GLUCOSE: 114 MG/DL (ref 68–131)
GLUCOSE SERPL-MCNC: 112 MG/DL (ref 70–110)
HBA1C MFR BLD: 5.6 % (ref 4–5.6)
HDLC SERPL-MCNC: 67 MG/DL (ref 40–75)
HDLC SERPL: 34.5 % (ref 20–50)
LDLC SERPL CALC-MCNC: 102.8 MG/DL (ref 63–159)
NONHDLC SERPL-MCNC: 127 MG/DL
POTASSIUM SERPL-SCNC: 4.8 MMOL/L (ref 3.5–5.1)
PROT SERPL-MCNC: 7.6 G/DL (ref 6–8.4)
SODIUM SERPL-SCNC: 141 MMOL/L (ref 136–145)
TRIGL SERPL-MCNC: 121 MG/DL (ref 30–150)

## 2023-03-02 PROCEDURE — 80053 COMPREHEN METABOLIC PANEL: CPT | Performed by: FAMILY MEDICINE

## 2023-03-02 PROCEDURE — 80061 LIPID PANEL: CPT | Performed by: FAMILY MEDICINE

## 2023-03-02 PROCEDURE — 36415 COLL VENOUS BLD VENIPUNCTURE: CPT | Mod: PO | Performed by: FAMILY MEDICINE

## 2023-03-02 PROCEDURE — 83036 HEMOGLOBIN GLYCOSYLATED A1C: CPT | Performed by: FAMILY MEDICINE

## 2023-03-21 ENCOUNTER — TELEPHONE (OUTPATIENT)
Dept: GASTROENTEROLOGY | Facility: CLINIC | Age: 53
End: 2023-03-21
Payer: OTHER GOVERNMENT

## 2023-03-22 ENCOUNTER — PATIENT MESSAGE (OUTPATIENT)
Dept: GASTROENTEROLOGY | Facility: CLINIC | Age: 53
End: 2023-03-22
Payer: OTHER GOVERNMENT

## 2023-05-24 ENCOUNTER — TELEPHONE (OUTPATIENT)
Dept: FAMILY MEDICINE | Facility: CLINIC | Age: 53
End: 2023-05-24

## 2023-05-24 ENCOUNTER — PATIENT MESSAGE (OUTPATIENT)
Dept: FAMILY MEDICINE | Facility: CLINIC | Age: 53
End: 2023-05-24

## 2023-05-24 DIAGNOSIS — J02.9 SORE THROAT: Primary | ICD-10-CM

## 2023-05-24 NOTE — TELEPHONE ENCOUNTER
Got patient scheduled for virtual appointment. Patient was confused between virtual and e-visit.     He was directly exposed to someone with covid and strep.     I will reach out to  to see if he would like to get orders in to have patient swabbed now or wait.

## 2023-06-13 ENCOUNTER — TELEPHONE (OUTPATIENT)
Dept: DIABETES | Facility: CLINIC | Age: 53
End: 2023-06-13
Payer: OTHER GOVERNMENT

## 2023-06-13 NOTE — TELEPHONE ENCOUNTER
----- Message from Sangeeta Cabello sent at 6/13/2023  4:03 PM CDT -----  Contact: self [phone]  Patient called in this afternoon requesting a call back concerning [facility]com. [facility] is waiting on the paperwork they sent on Monday. Please call back [phone] Thanks [email or web address]

## 2023-07-06 ENCOUNTER — ANESTHESIA EVENT (OUTPATIENT)
Dept: ENDOSCOPY | Facility: HOSPITAL | Age: 53
End: 2023-07-06
Payer: OTHER GOVERNMENT

## 2023-07-07 ENCOUNTER — HOSPITAL ENCOUNTER (OUTPATIENT)
Facility: HOSPITAL | Age: 53
Discharge: HOME | End: 2023-07-07
Attending: SURGERY | Admitting: SURGERY
Payer: OTHER GOVERNMENT

## 2023-07-07 ENCOUNTER — ANESTHESIA (OUTPATIENT)
Dept: ENDOSCOPY | Facility: HOSPITAL | Age: 53
End: 2023-07-07
Payer: OTHER GOVERNMENT

## 2023-07-07 VITALS
OXYGEN SATURATION: 100 % | SYSTOLIC BLOOD PRESSURE: 133 MMHG | RESPIRATION RATE: 16 BRPM | HEART RATE: 62 BPM | BODY MASS INDEX: 30.88 KG/M2 | WEIGHT: 228 LBS | TEMPERATURE: 98 F | DIASTOLIC BLOOD PRESSURE: 82 MMHG | HEIGHT: 72 IN

## 2023-07-07 DIAGNOSIS — Z12.11 ENCOUNTER FOR SCREENING COLONOSCOPY: ICD-10-CM

## 2023-07-07 PROCEDURE — 25000003 PHARM REV CODE 250: Mod: PO | Performed by: NURSE ANESTHETIST, CERTIFIED REGISTERED

## 2023-07-07 PROCEDURE — 63600175 PHARM REV CODE 636 W HCPCS: Mod: PO | Performed by: NURSE ANESTHETIST, CERTIFIED REGISTERED

## 2023-07-07 PROCEDURE — 45380 COLONOSCOPY AND BIOPSY: CPT | Mod: 33,,, | Performed by: SURGERY

## 2023-07-07 PROCEDURE — 45380 COLONOSCOPY AND BIOPSY: CPT | Mod: PT,PO | Performed by: SURGERY

## 2023-07-07 PROCEDURE — D9220A PRA ANESTHESIA: ICD-10-PCS | Mod: 33,CRNA,, | Performed by: NURSE ANESTHETIST, CERTIFIED REGISTERED

## 2023-07-07 PROCEDURE — D9220A PRA ANESTHESIA: ICD-10-PCS | Mod: 33,ANES,, | Performed by: ANESTHESIOLOGY

## 2023-07-07 PROCEDURE — 88305 TISSUE EXAM BY PATHOLOGIST: ICD-10-PCS | Mod: 26,,, | Performed by: PATHOLOGY

## 2023-07-07 PROCEDURE — 63600175 PHARM REV CODE 636 W HCPCS: Mod: PO | Performed by: SURGERY

## 2023-07-07 PROCEDURE — D9220A PRA ANESTHESIA: Mod: 33,CRNA,, | Performed by: NURSE ANESTHETIST, CERTIFIED REGISTERED

## 2023-07-07 PROCEDURE — 00811 ANES LWR INTST NDSC NOS: CPT | Mod: PO | Performed by: SURGERY

## 2023-07-07 PROCEDURE — 37000009 HC ANESTHESIA EA ADD 15 MINS: Mod: PO | Performed by: SURGERY

## 2023-07-07 PROCEDURE — D9220A PRA ANESTHESIA: Mod: 33,ANES,, | Performed by: ANESTHESIOLOGY

## 2023-07-07 PROCEDURE — 88305 TISSUE EXAM BY PATHOLOGIST: CPT | Mod: PO | Performed by: PATHOLOGY

## 2023-07-07 PROCEDURE — 27201012 HC FORCEPS, HOT/COLD, DISP: Mod: PO | Performed by: SURGERY

## 2023-07-07 PROCEDURE — 37000008 HC ANESTHESIA 1ST 15 MINUTES: Mod: PO | Performed by: SURGERY

## 2023-07-07 PROCEDURE — 45380 PR COLONOSCOPY,BIOPSY: ICD-10-PCS | Mod: 33,,, | Performed by: SURGERY

## 2023-07-07 PROCEDURE — 88305 TISSUE EXAM BY PATHOLOGIST: CPT | Mod: 26,,, | Performed by: PATHOLOGY

## 2023-07-07 RX ORDER — SODIUM CHLORIDE, SODIUM LACTATE, POTASSIUM CHLORIDE, CALCIUM CHLORIDE 600; 310; 30; 20 MG/100ML; MG/100ML; MG/100ML; MG/100ML
INJECTION, SOLUTION INTRAVENOUS CONTINUOUS
Status: DISCONTINUED | OUTPATIENT
Start: 2023-07-07 | End: 2023-07-07 | Stop reason: HOSPADM

## 2023-07-07 RX ORDER — LIDOCAINE HYDROCHLORIDE 20 MG/ML
INJECTION INTRAVENOUS
Status: DISCONTINUED | OUTPATIENT
Start: 2023-07-07 | End: 2023-07-07

## 2023-07-07 RX ORDER — PROPOFOL 10 MG/ML
INJECTION, EMULSION INTRAVENOUS
Status: DISCONTINUED | OUTPATIENT
Start: 2023-07-07 | End: 2023-07-07

## 2023-07-07 RX ADMIN — PROPOFOL 50 MG: 10 INJECTION, EMULSION INTRAVENOUS at 10:07

## 2023-07-07 RX ADMIN — PROPOFOL 125 MG: 10 INJECTION, EMULSION INTRAVENOUS at 09:07

## 2023-07-07 RX ADMIN — PROPOFOL 25 MG: 10 INJECTION, EMULSION INTRAVENOUS at 10:07

## 2023-07-07 RX ADMIN — LIDOCAINE HYDROCHLORIDE 100 MG: 20 INJECTION INTRAVENOUS at 09:07

## 2023-07-07 RX ADMIN — SODIUM CHLORIDE, POTASSIUM CHLORIDE, SODIUM LACTATE AND CALCIUM CHLORIDE: 600; 310; 30; 20 INJECTION, SOLUTION INTRAVENOUS at 09:07

## 2023-07-07 NOTE — ANESTHESIA POSTPROCEDURE EVALUATION
Anesthesia Post Evaluation    Patient: Raciel White    Procedure(s) Performed: Procedure(s) (LRB):  COLONOSCOPY (N/A)    Final Anesthesia Type: general      Patient location during evaluation: PACU  Patient participation: Yes- Able to Participate  Level of consciousness: sedated and awake  Post-procedure vital signs: reviewed and stable  Pain management: adequate  Airway patency: patent    PONV status at discharge: No PONV  Anesthetic complications: no      Cardiovascular status: hypertensive and blood pressure returned to baseline  Respiratory status: spontaneous ventilation  Hydration status: euvolemic  Follow-up not needed.          Vitals Value Taken Time   /82 07/07/23 1053   Temp 36.7 °C (98.1 °F) 07/07/23 1053   Pulse 62 07/07/23 1053   Resp 16 07/07/23 1053   SpO2 100 % 07/07/23 1053         Event Time   Out of Recovery 10:56:00         Pain/Mina Score: Mina Score: 10 (7/7/2023 10:53 AM)

## 2023-07-07 NOTE — ANESTHESIA PREPROCEDURE EVALUATION
07/07/2023  Raciel White is a 52 y.o., male.    Pre-op Assessment    I have reviewed the Patient Summary Reports.    I have reviewed the Nursing Notes. I have reviewed the NPO Status.   I have reviewed the Medications.     Review of Systems  Anesthesia Hx:  No problems with previous Anesthesia    Cardiovascular:  Cardiovascular Normal     Pulmonary:   Sleep Apnea, CPAP    Hepatic/GI:   Bowel Prep. GERD, well controlled    Endocrine:   gout       Physical Exam  General:  Well nourished      Airway/Jaw/Neck:  Airway Findings: Mallampati: II Neck ROM: Normal ROM       Dental:  Intact     Chest/Lungs:  Chest/Lungs Clear    Heart/Vascular:  Heart Findings: Normal            Anesthesia Plan  Type of Anesthesia, risks & benefits discussed:  Anesthesia Type:  general, Gen Natural Airway    Patient's Preference:   Plan Factors:          Intra-op Monitoring Plan: Standard ASA Monitors  Intra-op Monitoring Plan Comments:   Post Op Pain Control Plan:   Post Op Pain Control Plan Comments:     Induction:   IV  Beta Blocker:  Patient is not currently on a Beta-Blocker (No further documentation required).       Informed Consent: Informed consent signed with the Patient and all parties understand the risks and agree with anesthesia plan.  All questions answered.  Anesthesia consent signed with patient.  ASA Score: 3     Day of Surgery Review of History & Physical:              Ready For Surgery From Anesthesia Perspective.           Physical Exam  General: Well nourished    Airway:  Mallampati: II   Neck ROM: Normal ROM    Dental:  Intact          Anesthesia Plan  Type of Anesthesia, risks & benefits discussed:    Anesthesia Type: general, Gen Natural Airway  Intra-op Monitoring Plan: Standard ASA Monitors  Induction:  IV  Informed Consent: Informed consent signed with the Patient and all parties understand the risks and  agree with anesthesia plan.  All questions answered.   ASA Score: 3    Ready For Surgery From Anesthesia Perspective.       .

## 2023-07-07 NOTE — PROVATION PATIENT INSTRUCTIONS
Discharge Summary/Instructions after an Endoscopic Procedure  Patient Name: Raciel White  Patient MRN: [id number]  Patient YOB: 1970 Friday, July 7, 2023  Dk Paul MD  Dear patient,  As a result of recent federal legislation (The Federal Cures Act), you may   receive lab or pathology results from your procedure in your [facility]   account before your physician is able to contact you. Your physician or   their representative will relay the results to you with their   recommendations at their soonest availability.  Thank you,  RESTRICTIONS:  During your procedure today, you received medications for sedation.  These   medications may affect your judgment, balance and coordination.  Therefore,   for 24 hours, you have the following restrictions:   - DO NOT drive a car, operate machinery, make legal/financial decisions,   sign important papers or drink alcohol.    ACTIVITY:  Today: no heavy lifting, straining or running due to procedural   sedation/anesthesia.  The following day: return to full activity including work.  DIET:  Eat and drink normally unless instructed otherwise.     TREATMENT FOR COMMON SIDE EFFECTS:  - Mild abdominal pain, nausea, belching, bloating or excessive gas:  rest,   eat lightly and use a heating pad.  - Sore Throat: treat with throat lozenges and/or gargle with warm salt   water.  - Because air was used during the procedure, expelling large amounts of air   from your rectum or belching is normal.  - If a bowel prep was taken, you may not have a bowel movement for 1-3 days.    This is normal.  SYMPTOMS TO WATCH FOR AND REPORT TO YOUR PHYSICIAN:  1. Abdominal pain or bloating, other than gas cramps.  2. Chest pain.  3. Back pain.  4. Signs of infection such as: chills or fever occurring within 24 hours   after the procedure.  5. Rectal bleeding, which would show as bright red, maroon, or black stools.   (A tablespoon of blood from the rectum is not serious, especially if    hemorrhoids are present.)  6. Vomiting.  7. Weakness or dizziness.  GO DIRECTLY TO THE NEAREST EMERGENCY ROOM IF YOU HAVE ANY OF THE FOLLOWING:      Difficulty breathing              Chills and/or fever over 101 F   Persistent vomiting and/or vomiting blood   Severe abdominal pain   Severe chest pain   Black, tarry stools   Bleeding- more than one tablespoon   Any other symptom or condition that you feel may need urgent attention  Your doctor recommends these additional instructions:  If any biopsies were taken, your doctors clinic will contact you in 1 to 2   weeks with any results.  You are being discharged to home.   Resume your regular diet.   Continue your present medications.   We are waiting for your pathology results.   Your physician has recommended a repeat colonoscopy in five years for   surveillance.   Return to my office as needed.  For questions, problems or results please call your physician - Dk Paul MD at Work:  [phone].  EMERGENCY PHONE NUMBER: [phone], LAB RESULTS: [phone]  IF A COMPLICATION OR EMERGENCY SITUATION ARISES AND YOU ARE UNABLE TO REACH   YOUR PHYSICIAN - GO DIRECTLY TO THE EMERGENCY ROOM.  ___________________________________________  Nurse Signature  ___________________________________________  Patient/Designated Responsible Party Signature  Dk Paul MD  7/7/2023 10:22:20 AM  This report has been verified and signed electronically.  Dear patient,  As a result of recent federal legislation (The Federal Cures Act), you may   receive lab or pathology results from your procedure in your [facility]   account before your physician is able to contact you. Your physician or   their representative will relay the results to you with their   recommendations at their soonest availability.  Thank you.  PROVATION

## 2023-07-07 NOTE — TRANSFER OF CARE
Anesthesia Transfer of Care Note    Patient: Raciel White    Procedure(s) Performed: Procedure(s) (LRB):  COLONOSCOPY (N/A)    Patient location: [facility]    Anesthesia Type: general    Transport from OR: Transported from OR on 2-3 L/min O2 by NC with adequate spontaneous ventilation    Post pain: adequate analgesia    Post assessment: no apparent anesthetic complications and tolerated procedure well    Post vital signs: stable    Level of consciousness: awake and responds to stimulation    Nausea/Vomiting: no nausea/vomiting    Complications: none    Transfer of care protocol was followed      Last vitals:   Visit Vitals  /78   Pulse 65   Temp 36.8 °C (98.2 °F)   Resp 16   Ht 6' (1.829 m)   Wt 103.4 kg (228 lb)   SpO2 97%   BMI 30.92 kg/m²

## 2023-07-07 NOTE — H&P
[facility] - Endoscopy  History & Physical     Subjective:     Chief Complaint/Reason for Admission: screening colonoscopy    History of Present Illness:   Patient 52 y.o. male presents for screening colonoscopy.  He has never had a cscope.  He denies abd pain or changes in bowel habits.  He has no signifcant PMHx or PShx.   .    Patient Active Problem List    Diagnosis Date Noted    Gout 02/27/2023    Gastroesophageal reflux disease 02/27/2023    Cervical radiculopathy 03/22/2021    DDD (degenerative disc disease), cervical 03/22/2021    DDD (degenerative disc disease), lumbar 03/22/2021    Chronic pain 02/05/2021    Lumbar spondylosis 09/25/2020    Lumbar radiculopathy 12/13/2019    ROMA (obstructive sleep apnea) 08/13/2018    Rectal bleeding 10/22/2012        Medications Prior to Admission   Medication Sig Dispense Refill Last Dose    allopurinoL (ZYLOPRIM) 100 MG tablet 100 mg.   Past Week    loratadine (CLARITIN) 10 mg tablet Take 10 mg by mouth once daily.   Past Week    multivitamin capsule Take 1 capsule by mouth once daily.   Past Week    NEXIUM 20 mg capsule TAKE 1 CAPSULE DAILY 90 capsule 3 Past Month    acetaminophen (TYLENOL) 325 MG tablet Take 2 tablets (650 mg total) by mouth every 4 (four) hours as needed.  0 More than a month    amitriptyline (ELAVIL) 10 MG tablet TAKE 1 TABLET NIGHTLY AS NEEDED 90 tablet 3 More than a month    lidocaine (LIDODERM) 5 % Place 1 patch onto the skin once daily. Remove & Discard patch within 12 hours or as directed by MD 30 patch 3      Review of patient's allergies indicates:  No Known Allergies     Past Medical History:   Diagnosis Date    Arthritis     GERD (gastroesophageal reflux disease)     Insomnia     Sleep apnea     cpap      Past Surgical History:   Procedure Laterality Date    COLONOSCOPY  2011    EPIDURAL STEROID INJECTION N/A 4/9/2021    Procedure: INJECTION, STEROID, EPIDURAL, C7-T1;  Surgeon: Seamus Sotelo MD;  Location: [facility] [place] [facility];  Service: Pain  Management;  Laterality: N/A;    EPIDURAL STEROID INJECTION N/A 4/14/2022    Procedure: Injection, Steroid, Epidural C7/T1 SCHEDULE 1 WEEK AFTER RFA PER JENNIFER MAHONEY;  Surgeon: Seamus Sotelo MD;  Location: [facility] PAIN MGT;  Service: Pain Management;  Laterality: N/A;    EPIDURAL STEROID INJECTION INTO LUMBAR SPINE N/A 12/13/2019    Procedure: Injection-steroid-epidural-lumbar;  Surgeon: Juan Carlos Romeo MD;  Location: [facility] OR;  Service: Pain Management;  Laterality: N/A;  L5/S1 L>R    EPIDURAL STEROID INJECTION INTO LUMBAR SPINE N/A 1/27/2020    Procedure: Injection-steroid-epidural-lumbar L5/S1;  Surgeon: Juan Carlos Romeo MD;  Location: [facility] OR;  Service: Pain Management;  Laterality: N/A;    INJECTION OF ANESTHETIC AGENT AROUND MEDIAL BRANCH NERVES INNERVATING LUMBAR FACET JOINT Bilateral 9/25/2020    Procedure: Block-nerve-medial branch-lumbar L3, L4, L5;  Surgeon: Juan Carlos Romeo MD;  Location: [facility] OR;  Service: Pain Management;  Laterality: Bilateral;    INJECTION OF ANESTHETIC AGENT AROUND MEDIAL BRANCH NERVES INNERVATING LUMBAR FACET JOINT Bilateral 10/20/2020    Procedure: Block-nerve-medial branch-lumbar, L3, 4, 5;  Surgeon: Juan Carlos Romeo MD;  Location: [facility] OR;  Service: Pain Management;  Laterality: Bilateral;    LARYNGOSCOPY Bilateral 11/2/2018    Procedure: Sleep endoscopy;  Surgeon: Brandin Rosen MD;  Location: [facility] OR;  Service: ENT;  Laterality: Bilateral;    MYOTOMY AND SUSPENSION OF HYOID Bilateral 9/27/2018    Procedure: Hyoid suspension;  Surgeon: Brandin Rosen MD;  Location: [facility] OR;  Service: ENT;  Laterality: Bilateral;    RADIOFREQUENCY ABLATION Right 2/5/2021    Procedure: RADIOFREQUENCY ABLATION RIGHT L3,4.5 1 of 2;  Surgeon: Seamus Sotelo MD;  Location: [facility] PAIN MGT;  Service: Pain Management;  Laterality: Right;    RADIOFREQUENCY ABLATION Left 2/19/2021    Procedure: RADIOFREQUENCY ABLATION LEFT L3,4,5 2 of 2;  Surgeon: Seamus Sotelo MD;  Location:  [facility] PAIN MGT;  Service: Pain Management;  Laterality: Left;    RADIOFREQUENCY ABLATION Left 2/10/2022    Procedure: Radiofrequency Ablation LEFT L3,4,5;  Surgeon: Seamus Sotelo MD;  Location: [facility] PAIN MGT;  Service: Pain Management;  Laterality: Left;    RADIOFREQUENCY ABLATION Right 2/17/2022    Procedure: Radiofrequency Ablation RIGHT L3,4,5 SCHEDULE 1 WEEK AFTER LEFT SIDE PER JENNIFER VERGARA;  Surgeon: Seamus Sotelo MD;  Location: [facility] PAIN MGT;  Service: Pain Management;  Laterality: Right;    SPINE SURGERY  2008    Neck fusion C6/7??      Family History   Problem Relation Age of Onset    Hypertension Paternal Grandfather     Diabetes Neg Hx     Cancer Neg Hx     Glaucoma Neg Hx     Macular degeneration Neg Hx     Retinal detachment Neg Hx       Social History     Tobacco Use    Smoking status: Former     Packs/day: 0.50     Types: Vaping with nicotine, Cigarettes    Smokeless tobacco: Former   Substance Use Topics    Alcohol use: Yes     Alcohol/week: 12.0 standard drinks     Types: 12 Cans of beer per week     Comment: weekly        Review of Systems:  A comprehensive review of systems was negative.    OBJECTIVE:     Patient Vitals for the past 8 hrs:   BP Temp Pulse Resp SpO2   07/07/23 0917 128/78 98.2 °F (36.8 °C) 65 16 97 %     AAox3  CTA B  Soft/nt/nd      Data Review:      ASSESSMENT/PLAN:     There are no hospital problems to display for this patient.    Screening colonoscopy  Plan:  To have cscope today

## 2023-07-11 LAB
FINAL PATHOLOGIC DIAGNOSIS: NORMAL
GROSS: NORMAL
Lab: NORMAL

## 2023-07-24 ENCOUNTER — TELEPHONE (OUTPATIENT)
Dept: SURGERY | Facility: CLINIC | Age: 53
End: 2023-07-24
Payer: OTHER GOVERNMENT

## 2023-07-24 NOTE — TELEPHONE ENCOUNTER
Called and reviewed pathology with pt.      Colon, descending, polyp, biopsy:   - Tubular adenoma.     Recommend repeat cscope in 5 years

## 2023-08-09 ENCOUNTER — PATIENT MESSAGE (OUTPATIENT)
Dept: ADMINISTRATIVE | Facility: HOSPITAL | Age: 53
End: 2023-08-09
Payer: OTHER GOVERNMENT

## 2023-08-18 ENCOUNTER — TELEPHONE (OUTPATIENT)
Dept: PAIN MEDICINE | Facility: CLINIC | Age: 53
End: 2023-08-18
Payer: OTHER GOVERNMENT

## 2023-08-18 DIAGNOSIS — M47.816 LUMBAR SPONDYLOSIS: Primary | ICD-10-CM

## 2023-08-26 ENCOUNTER — PATIENT MESSAGE (OUTPATIENT)
Dept: PAIN MEDICINE | Facility: CLINIC | Age: 53
End: 2023-08-26
Payer: OTHER GOVERNMENT

## 2023-09-09 ENCOUNTER — PATIENT MESSAGE (OUTPATIENT)
Dept: ADMINISTRATIVE | Facility: OTHER | Age: 53
End: 2023-09-09
Payer: OTHER GOVERNMENT

## 2023-09-14 ENCOUNTER — PATIENT MESSAGE (OUTPATIENT)
Dept: PAIN MEDICINE | Facility: CLINIC | Age: 53
End: 2023-09-14
Payer: OTHER GOVERNMENT

## 2023-09-14 ENCOUNTER — HOSPITAL ENCOUNTER (OUTPATIENT)
Facility: OTHER | Age: 53
Discharge: HOME | End: 2023-09-14
Attending: ANESTHESIOLOGY | Admitting: ANESTHESIOLOGY
Payer: OTHER GOVERNMENT

## 2023-09-14 VITALS
RESPIRATION RATE: 17 BRPM | HEIGHT: 72 IN | BODY MASS INDEX: 28.44 KG/M2 | OXYGEN SATURATION: 100 % | WEIGHT: 210 LBS | HEART RATE: 74 BPM | DIASTOLIC BLOOD PRESSURE: 81 MMHG | SYSTOLIC BLOOD PRESSURE: 127 MMHG | TEMPERATURE: 98 F

## 2023-09-14 DIAGNOSIS — G89.29 CHRONIC PAIN: ICD-10-CM

## 2023-09-14 DIAGNOSIS — M47.896 OTHER OSTEOARTHRITIS OF SPINE, LUMBAR REGION: Primary | ICD-10-CM

## 2023-09-14 DIAGNOSIS — M47.819 SPONDYLOSIS WITHOUT MYELOPATHY: ICD-10-CM

## 2023-09-14 PROCEDURE — 63600175 PHARM REV CODE 636 W HCPCS: Performed by: ANESTHESIOLOGY

## 2023-09-14 PROCEDURE — 64635 PR DESTROY LUMB/SAC FACET JNT: ICD-10-PCS | Mod: LT,,, | Performed by: ANESTHESIOLOGY

## 2023-09-14 PROCEDURE — 64635 DESTROY LUMB/SAC FACET JNT: CPT | Mod: LT | Performed by: ANESTHESIOLOGY

## 2023-09-14 PROCEDURE — 25000003 PHARM REV CODE 250: Performed by: ANESTHESIOLOGY

## 2023-09-14 PROCEDURE — 64636 DESTROY L/S FACET JNT ADDL: CPT | Mod: LT | Performed by: ANESTHESIOLOGY

## 2023-09-14 PROCEDURE — 25000003 PHARM REV CODE 250: Performed by: STUDENT IN AN ORGANIZED HEALTH CARE EDUCATION/TRAINING PROGRAM

## 2023-09-14 PROCEDURE — 99152 MOD SED SAME PHYS/QHP 5/>YRS: CPT | Performed by: ANESTHESIOLOGY

## 2023-09-14 PROCEDURE — 64636 DESTROY L/S FACET JNT ADDL: CPT | Mod: LT

## 2023-09-14 PROCEDURE — 64636 DESTROY L/S FACET JNT ADDL: CPT | Mod: LT,,, | Performed by: ANESTHESIOLOGY

## 2023-09-14 PROCEDURE — 64635 DESTROY LUMB/SAC FACET JNT: CPT | Mod: LT

## 2023-09-14 PROCEDURE — 64635 DESTROY LUMB/SAC FACET JNT: CPT | Mod: LT,,, | Performed by: ANESTHESIOLOGY

## 2023-09-14 PROCEDURE — 64636 PR DESTROY L/S FACET JNT ADDL: ICD-10-PCS | Mod: LT,,, | Performed by: ANESTHESIOLOGY

## 2023-09-14 PROCEDURE — 99152 PR MOD CONSCIOUS SEDATION, SAME PHYS, 5+ YRS, FIRST 15 MIN: ICD-10-PCS | Mod: ,,, | Performed by: ANESTHESIOLOGY

## 2023-09-14 PROCEDURE — 99152 MOD SED SAME PHYS/QHP 5/>YRS: CPT | Mod: ,,, | Performed by: ANESTHESIOLOGY

## 2023-09-14 RX ORDER — DEXAMETHASONE SODIUM PHOSPHATE 10 MG/ML
INJECTION INTRAMUSCULAR; INTRAVENOUS
Status: DISCONTINUED | OUTPATIENT
Start: 2023-09-14 | End: 2023-09-14 | Stop reason: HOSPADM

## 2023-09-14 RX ORDER — MIDAZOLAM HYDROCHLORIDE 1 MG/ML
INJECTION INTRAMUSCULAR; INTRAVENOUS
Status: DISCONTINUED | OUTPATIENT
Start: 2023-09-14 | End: 2023-09-14 | Stop reason: HOSPADM

## 2023-09-14 RX ORDER — BUPIVACAINE HYDROCHLORIDE 2.5 MG/ML
INJECTION, SOLUTION EPIDURAL; INFILTRATION; INTRACAUDAL
Status: DISCONTINUED | OUTPATIENT
Start: 2023-09-14 | End: 2023-09-14 | Stop reason: HOSPADM

## 2023-09-14 RX ORDER — FENTANYL CITRATE 50 UG/ML
INJECTION, SOLUTION INTRAMUSCULAR; INTRAVENOUS
Status: DISCONTINUED | OUTPATIENT
Start: 2023-09-14 | End: 2023-09-14 | Stop reason: HOSPADM

## 2023-09-14 RX ORDER — SODIUM CHLORIDE 9 MG/ML
INJECTION, SOLUTION INTRAVENOUS CONTINUOUS
Status: DISCONTINUED | OUTPATIENT
Start: 2023-09-14 | End: 2023-09-14 | Stop reason: HOSPADM

## 2023-09-14 RX ORDER — LIDOCAINE HYDROCHLORIDE 20 MG/ML
INJECTION, SOLUTION INFILTRATION; PERINEURAL
Status: DISCONTINUED | OUTPATIENT
Start: 2023-09-14 | End: 2023-09-14 | Stop reason: HOSPADM

## 2023-09-14 NOTE — DISCHARGE INSTRUCTIONS

## 2023-09-14 NOTE — OP NOTE
Therapeutic Lumbar Medial Branch Radiofrequency Ablation under Fluoroscopy     The procedure, risks, benefits, and options were discussed with the patient. There are no contraindications to the procedure. The patent expressed understanding and agreed to the procedure. Informed written consent was obtained prior to the start of the procedure and can be found in the patient's chart.        PATIENT NAME: Raciel White   MRN: [id number]     DATE OF PROCEDURE: 09/14/2023     PROCEDURE:  Left L3, L4, and L5 Lumbar Radiofrequency Ablation under Fluoroscopy    PRE-OP DIAGNOSIS: Lumbar spondylosis [M47.816] Lumbar spondylosis [M47.816]    POST-OP DIAGNOSIS: Same    PHYSICIAN: Seamus Sotelo MD    ASSISTANTS: Sabas Denson M.D. ([facility] Fellow)     MEDICATIONS INJECTED:  Preservative-free Decadron 10mg with 9cc of Bupivicaine 0.25%    LOCAL ANESTHETIC INJECTED:   Xylocaine 2%    SEDATION: Versed 2mg and Fentanyl 50mcg                                                                                                                                                                                     Conscious sedation ordered by M.D. Patient re-evaluation prior to administration of conscious sedation. No changes noted in patient's status from initial evaluation. The patient's vital signs were monitored by RN and patient remained hemodynamically stable throughout the procedure.    Event Time In   Sedation Start 1425   Sedation End 1438       ESTIMATED BLOOD LOSS:  None    COMPLICATIONS:  None     INTERVAL HISTORY: Patient has clinical and imaging findings suggestive of facet mediated pain. Patients has completed 2 previous diagnostic medial branch blocks at specified levels with at least 80% relief for the expected duration of the local anesthetic utilized.    TECHNIQUE: Time-out was performed to identify the patient and procedure to be performed. With the patient laying in a prone position, the surgical area was prepped and  draped in the usual sterile fashion using ChloraPrep and fenestrated drape. The levels were determined under fluoroscopic guidance. Skin anesthesia was achieved by injecting Lidocaine 2% over the injection sites. A 18 gauge 10mm curved active tip needle was introduced to the anatomic local of the medial branch at each of the above levels using AP, lateral and/or contralateral oblique fluoroscopic imaging. Then sensory and motor testing was performed to confirm that the needle tips were in the correct location. After negative aspiration for blood or CSF was confirmed, 1 mL of the lidocaine 2% listed above was injected slowly at each site. This was followed by thermal lesioning at 80 degrees celsius for 90 seconds. That was followed by slowly injecting 1 mL of the medication mixture listed above at each site. The needles were removed and bleeding was nil. A sterile dressing was applied. No specimens collected. The patient tolerated the procedure well and did not have any procedure related motor deficit at the conclusion of the procedure.    The patient was monitored after the procedure in the recovery area. They were given post-procedure and discharge instructions to follow at home. The patient was discharged in a stable condition.    Sabas Denson MD    I reviewed and edited the fellow's note. I conducted my own interview and physical examination. I agree with the findings. I was present and supervising all critical portions of the procedure.      Seamus Sotelo MD

## 2023-09-14 NOTE — DISCHARGE SUMMARY
Discharge Note  Short Stay      SUMMARY     Admit Date: 9/14/2023    Attending Physician: Seamus Sotelo MD      Discharge Physician: Sabas Denson      Discharge Date: 9/14/2023 2:23 PM    Procedure(s) (LRB):  RADIOFREQUENCY ABLATION, LEFT L3-L4 AND L5 MEDIAL BRANCH ONE OF TWO (Left)    Final Diagnosis: Lumbar spondylosis [M47.816]    Disposition: Home or self care    Patient Instructions:   Current Discharge Medication List        CONTINUE these medications which have NOT CHANGED    Details   acetaminophen (TYLENOL) 325 MG tablet Take 2 tablets (650 mg total) by mouth every 4 (four) hours as needed.  Refills: 0      allopurinoL (ZYLOPRIM) 100 MG tablet 100 mg.      amitriptyline (ELAVIL) 10 MG tablet TAKE 1 TABLET NIGHTLY AS NEEDED  Qty: 90 tablet, Refills: 3    Associated Diagnoses: Mild anxiety      lidocaine (LIDODERM) 5 % Place 1 patch onto the skin once daily. Remove & Discard patch within 12 hours or as directed by MD  Qty: 30 patch, Refills: 3      loratadine (CLARITIN) 10 mg tablet Take 10 mg by mouth once daily.      multivitamin capsule Take 1 capsule by mouth once daily.      NEXIUM 20 mg capsule TAKE 1 CAPSULE DAILY  Qty: 90 capsule, Refills: 3    Associated Diagnoses: Gastroesophageal reflux disease without esophagitis                 Discharge Diagnosis: Lumbar spondylosis [M47.816]  Condition on Discharge: Stable with no complications to procedure   Diet on Discharge: Same as before.  Activity: as per instruction sheet.  Discharge to: Home with a responsible adult.  Follow up: 2-4 weeks       Please call my office or pager at [phone] if experienced any weakness or loss of sensation, fever > 101.5, pain uncontrolled with oral medications, persistent nausea/vomiting/or diarrhea, redness or drainage from the incisions, or any other worrisome concerns. If physician on call was not reached or could not communicate with our office for any reason please go to the nearest emergency department

## 2023-09-14 NOTE — H&P
HPI  Patient presenting for Procedure(s) (LRB):  RADIOFREQUENCY ABLATION, LEFT L3-L4 AND L5 MEDIAL BRANCH ONE OF TWO (Left)     Patient on Anti-coagulation No    No health changes since previous encounter    Past Medical History:   Diagnosis Date    Arthritis     GERD (gastroesophageal reflux disease)     Insomnia     Sleep apnea     cpap     Past Surgical History:   Procedure Laterality Date    COLONOSCOPY  2011    COLONOSCOPY N/A 7/7/2023    Procedure: COLONOSCOPY;  Surgeon: Dk Paul MD;  Location: [facility] ENDO;  Service: Endoscopy;  Laterality: N/A;    EPIDURAL STEROID INJECTION N/A 4/9/2021    Procedure: INJECTION, STEROID, EPIDURAL, C7-T1;  Surgeon: Seamus Sotelo MD;  Location: [facility] PAIN MGT;  Service: Pain Management;  Laterality: N/A;    EPIDURAL STEROID INJECTION N/A 4/14/2022    Procedure: Injection, Steroid, Epidural C7/T1 SCHEDULE 1 WEEK AFTER RFA PER JENNIFER MAHONEY;  Surgeon: Seamus Sotelo MD;  Location: [facility] PAIN MGT;  Service: Pain Management;  Laterality: N/A;    EPIDURAL STEROID INJECTION INTO LUMBAR SPINE N/A 12/13/2019    Procedure: Injection-steroid-epidural-lumbar;  Surgeon: Juan Carlos Romeo MD;  Location: [facility] OR;  Service: Pain Management;  Laterality: N/A;  L5/S1 L>R    EPIDURAL STEROID INJECTION INTO LUMBAR SPINE N/A 1/27/2020    Procedure: Injection-steroid-epidural-lumbar L5/S1;  Surgeon: Juan Carlos Romeo MD;  Location: [facility] OR;  Service: Pain Management;  Laterality: N/A;    INJECTION OF ANESTHETIC AGENT AROUND MEDIAL BRANCH NERVES INNERVATING LUMBAR FACET JOINT Bilateral 9/25/2020    Procedure: Block-nerve-medial branch-lumbar L3, L4, L5;  Surgeon: Juan Carlos Romeo MD;  Location: [facility] OR;  Service: Pain Management;  Laterality: Bilateral;    INJECTION OF ANESTHETIC AGENT AROUND MEDIAL BRANCH NERVES INNERVATING LUMBAR FACET JOINT Bilateral 10/20/2020    Procedure: Block-nerve-medial branch-lumbar, L3, 4, 5;  Surgeon: Juan Carlos Romeo MD;  Location: [facility] OR;   Service: Pain Management;  Laterality: Bilateral;    LARYNGOSCOPY Bilateral 11/2/2018    Procedure: Sleep endoscopy;  Surgeon: Brandin Rosen MD;  Location: [facility] OR;  Service: ENT;  Laterality: Bilateral;    MYOTOMY AND SUSPENSION OF HYOID Bilateral 9/27/2018    Procedure: Hyoid suspension;  Surgeon: Brandin Rosen MD;  Location: [facility] OR;  Service: ENT;  Laterality: Bilateral;    RADIOFREQUENCY ABLATION Right 2/5/2021    Procedure: RADIOFREQUENCY ABLATION RIGHT L3,4.5 1 of 2;  Surgeon: Seamus Sotelo MD;  Location: [facility] PAIN MGT;  Service: Pain Management;  Laterality: Right;    RADIOFREQUENCY ABLATION Left 2/19/2021    Procedure: RADIOFREQUENCY ABLATION LEFT L3,4,5 2 of 2;  Surgeon: Seamus Sotelo MD;  Location: [facility] PAIN MGT;  Service: Pain Management;  Laterality: Left;    RADIOFREQUENCY ABLATION Left 2/10/2022    Procedure: Radiofrequency Ablation LEFT L3,4,5;  Surgeon: Seamus Sotelo MD;  Location: [facility] PAIN MGT;  Service: Pain Management;  Laterality: Left;    RADIOFREQUENCY ABLATION Right 2/17/2022    Procedure: Radiofrequency Ablation RIGHT L3,4,5 SCHEDULE 1 WEEK AFTER LEFT SIDE PER JENNIFER VERGARA;  Surgeon: Seamus Sotelo MD;  Location: [facility] PAIN MGT;  Service: Pain Management;  Laterality: Right;    SPINE SURGERY  2008    Neck fusion C6/7??     Review of patient's allergies indicates:  No Known Allergies   Current Facility-Administered Medications   Medication    0.9%  NaCl infusion     Facility-Administered Medications Ordered in Other Encounters   Medication    0.9%  NaCl infusion    lactated ringers infusion       PMHx, PSHx, Allergies, Medications reviewed in epic    ROS negative except pain complaints in HPI    OBJECTIVE:    /86 (BP Location: Right arm, Patient Position: Lying)   Pulse 90   Temp 98.2 °F (36.8 °C) (Oral)   Resp 16   Ht 6' (1.829 m)   Wt 95.3 kg (210 lb)   SpO2 95%   BMI 28.48 kg/m²     PHYSICAL EXAMINATION:    GENERAL: Well appearing, in no acute  distress, alert and oriented x3.  PSYCH:  Mood and affect appropriate.  SKIN: Skin color, texture, turgor normal, no rashes or lesions which will impact the procedure.  CV: RRR with palpation of the radial artery.  PULM: No evidence of respiratory difficulty, symmetric chest rise. Clear to auscultation.  NEURO: Cranial nerves grossly intact.    Plan:    Proceed with procedure as planned Procedure(s) (LRB):  RADIOFREQUENCY ABLATION, LEFT L3-L4 AND L5 MEDIAL BRANCH ONE OF TWO (Left)    Sabas Denson  09/14/2023

## 2023-09-28 ENCOUNTER — HOSPITAL ENCOUNTER (OUTPATIENT)
Facility: OTHER | Age: 53
Discharge: HOME | End: 2023-09-28
Attending: ANESTHESIOLOGY | Admitting: ANESTHESIOLOGY
Payer: OTHER GOVERNMENT

## 2023-09-28 VITALS
HEIGHT: 72 IN | DIASTOLIC BLOOD PRESSURE: 80 MMHG | BODY MASS INDEX: 29.8 KG/M2 | HEART RATE: 86 BPM | SYSTOLIC BLOOD PRESSURE: 130 MMHG | OXYGEN SATURATION: 98 % | RESPIRATION RATE: 16 BRPM | WEIGHT: 220 LBS | TEMPERATURE: 98 F

## 2023-09-28 DIAGNOSIS — G89.29 CHRONIC PAIN: ICD-10-CM

## 2023-09-28 DIAGNOSIS — M47.816 LUMBAR SPONDYLOSIS: Primary | ICD-10-CM

## 2023-09-28 PROCEDURE — 64636 DESTROY L/S FACET JNT ADDL: CPT | Mod: RT,,, | Performed by: ANESTHESIOLOGY

## 2023-09-28 PROCEDURE — 64635 PR DESTROY LUMB/SAC FACET JNT: ICD-10-PCS | Mod: RT,,, | Performed by: ANESTHESIOLOGY

## 2023-09-28 PROCEDURE — 64635 DESTROY LUMB/SAC FACET JNT: CPT | Mod: RT | Performed by: ANESTHESIOLOGY

## 2023-09-28 PROCEDURE — 64635 DESTROY LUMB/SAC FACET JNT: CPT | Mod: RT,,, | Performed by: ANESTHESIOLOGY

## 2023-09-28 PROCEDURE — 63600175 PHARM REV CODE 636 W HCPCS: Performed by: ANESTHESIOLOGY

## 2023-09-28 PROCEDURE — 25000003 PHARM REV CODE 250: Performed by: ANESTHESIOLOGY

## 2023-09-28 PROCEDURE — 99152 PR MOD CONSCIOUS SEDATION, SAME PHYS, 5+ YRS, FIRST 15 MIN: ICD-10-PCS | Mod: ,,, | Performed by: ANESTHESIOLOGY

## 2023-09-28 PROCEDURE — 64636 DESTROY L/S FACET JNT ADDL: CPT | Mod: RT | Performed by: ANESTHESIOLOGY

## 2023-09-28 PROCEDURE — 99152 MOD SED SAME PHYS/QHP 5/>YRS: CPT | Mod: ,,, | Performed by: ANESTHESIOLOGY

## 2023-09-28 PROCEDURE — 99152 MOD SED SAME PHYS/QHP 5/>YRS: CPT | Performed by: ANESTHESIOLOGY

## 2023-09-28 PROCEDURE — 64636 PR DESTROY L/S FACET JNT ADDL: ICD-10-PCS | Mod: RT,,, | Performed by: ANESTHESIOLOGY

## 2023-09-28 RX ORDER — LIDOCAINE HYDROCHLORIDE 20 MG/ML
INJECTION, SOLUTION INFILTRATION; PERINEURAL
Status: DISCONTINUED | OUTPATIENT
Start: 2023-09-28 | End: 2023-09-28 | Stop reason: HOSPADM

## 2023-09-28 RX ORDER — MIDAZOLAM HYDROCHLORIDE 1 MG/ML
INJECTION INTRAMUSCULAR; INTRAVENOUS
Status: DISCONTINUED | OUTPATIENT
Start: 2023-09-28 | End: 2023-09-28 | Stop reason: HOSPADM

## 2023-09-28 RX ORDER — SODIUM CHLORIDE 9 MG/ML
INJECTION, SOLUTION INTRAVENOUS CONTINUOUS
Status: DISCONTINUED | OUTPATIENT
Start: 2023-09-28 | End: 2023-09-28 | Stop reason: HOSPADM

## 2023-09-28 RX ORDER — BUPIVACAINE HYDROCHLORIDE 2.5 MG/ML
INJECTION, SOLUTION EPIDURAL; INFILTRATION; INTRACAUDAL
Status: DISCONTINUED | OUTPATIENT
Start: 2023-09-28 | End: 2023-09-28 | Stop reason: HOSPADM

## 2023-09-28 RX ORDER — DEXAMETHASONE SODIUM PHOSPHATE 10 MG/ML
INJECTION INTRAMUSCULAR; INTRAVENOUS
Status: DISCONTINUED | OUTPATIENT
Start: 2023-09-28 | End: 2023-09-28 | Stop reason: HOSPADM

## 2023-09-28 RX ORDER — FENTANYL CITRATE 50 UG/ML
INJECTION, SOLUTION INTRAMUSCULAR; INTRAVENOUS
Status: DISCONTINUED | OUTPATIENT
Start: 2023-09-28 | End: 2023-09-28 | Stop reason: HOSPADM

## 2023-09-28 NOTE — DISCHARGE SUMMARY
Discharge Note  Short Stay      SUMMARY     Admit Date: 9/28/2023    Attending Physician: Chandan Sandra      Discharge Physician: Chandan Sandra      Discharge Date: 9/28/2023     Procedure(s) (LRB):  RADIOFREQUENCY ABLATION, RIGHT L3-L4 AND L5 MEDIAL BRANCH TWO OF TWO (Right)    Final Diagnosis: Lumbar spondylosis [M47.816]    Disposition: Home or self care    Patient Instructions:   Current Discharge Medication List        CONTINUE these medications which have NOT CHANGED    Details   acetaminophen (TYLENOL) 325 MG tablet Take 2 tablets (650 mg total) by mouth every 4 (four) hours as needed.  Refills: 0      allopurinoL (ZYLOPRIM) 100 MG tablet 100 mg.      amitriptyline (ELAVIL) 10 MG tablet TAKE 1 TABLET NIGHTLY AS NEEDED  Qty: 90 tablet, Refills: 3    Associated Diagnoses: Mild anxiety      lidocaine (LIDODERM) 5 % Place 1 patch onto the skin once daily. Remove & Discard patch within 12 hours or as directed by MD  Qty: 30 patch, Refills: 3      loratadine (CLARITIN) 10 mg tablet Take 10 mg by mouth once daily.      multivitamin capsule Take 1 capsule by mouth once daily.      NEXIUM 20 mg capsule TAKE 1 CAPSULE DAILY  Qty: 90 capsule, Refills: 3    Associated Diagnoses: Gastroesophageal reflux disease without esophagitis                 Discharge Diagnosis: Lumbar spondylosis [M47.816]  Condition on Discharge: Stable with no complications to procedure   Diet on Discharge: Same as before.  Activity: as per instruction sheet.  Discharge to: Home with a responsible adult.  Follow up: 2-4 weeks       Please call my office or pager at [phone] if experienced any weakness or loss of sensation, fever > 101.5, pain uncontrolled with oral medications, persistent nausea/vomiting/or diarrhea, redness or drainage from the incisions, or any other worrisome concerns. If physician on call was not reached or could not communicate with our office for any reason please go to the nearest emergency department

## 2023-09-28 NOTE — DISCHARGE INSTRUCTIONS

## 2023-09-28 NOTE — H&P
HPI  Patient presenting for Procedure(s) (LRB):  RADIOFREQUENCY ABLATION, RIGHT L3-L4 AND L5 MEDIAL BRANCH TWO OF TWO (Right)     Patient on Anti-coagulation No    No health changes since previous encounter    Past Medical History:   Diagnosis Date    Arthritis     GERD (gastroesophageal reflux disease)     Insomnia     Sleep apnea     cpap     Past Surgical History:   Procedure Laterality Date    COLONOSCOPY  2011    COLONOSCOPY N/A 7/7/2023    Procedure: COLONOSCOPY;  Surgeon: Dk Paul MD;  Location: [facility] ENDO;  Service: Endoscopy;  Laterality: N/A;    EPIDURAL STEROID INJECTION N/A 4/9/2021    Procedure: INJECTION, STEROID, EPIDURAL, C7-T1;  Surgeon: Seamus Sotelo MD;  Location: [facility] PAIN MGT;  Service: Pain Management;  Laterality: N/A;    EPIDURAL STEROID INJECTION N/A 4/14/2022    Procedure: Injection, Steroid, Epidural C7/T1 SCHEDULE 1 WEEK AFTER RFA PER JENNIFER MAHONEY;  Surgeon: Seamus Sotelo MD;  Location: [facility] PAIN MGT;  Service: Pain Management;  Laterality: N/A;    EPIDURAL STEROID INJECTION INTO LUMBAR SPINE N/A 12/13/2019    Procedure: Injection-steroid-epidural-lumbar;  Surgeon: Juan Carlos Romeo MD;  Location: [facility] OR;  Service: Pain Management;  Laterality: N/A;  L5/S1 L>R    EPIDURAL STEROID INJECTION INTO LUMBAR SPINE N/A 1/27/2020    Procedure: Injection-steroid-epidural-lumbar L5/S1;  Surgeon: Juan Carlos Romeo MD;  Location: [facility] OR;  Service: Pain Management;  Laterality: N/A;    INJECTION OF ANESTHETIC AGENT AROUND MEDIAL BRANCH NERVES INNERVATING LUMBAR FACET JOINT Bilateral 9/25/2020    Procedure: Block-nerve-medial branch-lumbar L3, L4, L5;  Surgeon: Juan Carlos Romeo MD;  Location: [facility] OR;  Service: Pain Management;  Laterality: Bilateral;    INJECTION OF ANESTHETIC AGENT AROUND MEDIAL BRANCH NERVES INNERVATING LUMBAR FACET JOINT Bilateral 10/20/2020    Procedure: Block-nerve-medial branch-lumbar, L3, 4, 5;  Surgeon: Juan Carlos Romeo MD;  Location: [facility] OR;   Service: Pain Management;  Laterality: Bilateral;    LARYNGOSCOPY Bilateral 11/2/2018    Procedure: Sleep endoscopy;  Surgeon: Brandin Rosen MD;  Location: [facility] OR;  Service: ENT;  Laterality: Bilateral;    MYOTOMY AND SUSPENSION OF HYOID Bilateral 9/27/2018    Procedure: Hyoid suspension;  Surgeon: Brandin Rosen MD;  Location: [facility] OR;  Service: ENT;  Laterality: Bilateral;    RADIOFREQUENCY ABLATION Right 2/5/2021    Procedure: RADIOFREQUENCY ABLATION RIGHT L3,4.5 1 of 2;  Surgeon: Seamus Sotelo MD;  Location: [facility] PAIN MGT;  Service: Pain Management;  Laterality: Right;    RADIOFREQUENCY ABLATION Left 2/19/2021    Procedure: RADIOFREQUENCY ABLATION LEFT L3,4,5 2 of 2;  Surgeon: Seamus Sotelo MD;  Location: [facility] PAIN MGT;  Service: Pain Management;  Laterality: Left;    RADIOFREQUENCY ABLATION Left 2/10/2022    Procedure: Radiofrequency Ablation LEFT L3,4,5;  Surgeon: Seamus Sotelo MD;  Location: [facility] PAIN MGT;  Service: Pain Management;  Laterality: Left;    RADIOFREQUENCY ABLATION Right 2/17/2022    Procedure: Radiofrequency Ablation RIGHT L3,4,5 SCHEDULE 1 WEEK AFTER LEFT SIDE PER JENNIFER VERGARA;  Surgeon: Seamus Sotelo MD;  Location: [facility] PAIN MGT;  Service: Pain Management;  Laterality: Right;    RADIOFREQUENCY ABLATION Left 9/14/2023    Procedure: RADIOFREQUENCY ABLATION, LEFT L3-L4 AND L5 MEDIAL BRANCH ONE OF TWO;  Surgeon: Seamus Sotelo MD;  Location: [facility] PAIN MGT;  Service: Pain Management;  Laterality: Left;    SPINE SURGERY  2008    Neck fusion C6/7??     Review of patient's allergies indicates:  No Known Allergies   No current facility-administered medications for this encounter.     Current Outpatient Medications   Medication Sig    acetaminophen (TYLENOL) 325 MG tablet Take 2 tablets (650 mg total) by mouth every 4 (four) hours as needed.    allopurinoL (ZYLOPRIM) 100 MG tablet 100 mg.    amitriptyline (ELAVIL) 10 MG tablet TAKE 1 TABLET NIGHTLY AS NEEDED    lidocaine  (LIDODERM) 5 % Place 1 patch onto the skin once daily. Remove & Discard patch within 12 hours or as directed by MD    loratadine (CLARITIN) 10 mg tablet Take 10 mg by mouth once daily.    multivitamin capsule Take 1 capsule by mouth once daily.    NEXIUM 20 mg capsule TAKE 1 CAPSULE DAILY     Facility-Administered Medications Ordered in Other Encounters   Medication    0.9%  NaCl infusion    lactated ringers infusion       PMHx, PSHx, Allergies, Medications reviewed in epic    ROS negative except pain complaints in HPI    OBJECTIVE:    There were no vitals taken for this visit.    PHYSICAL EXAMINATION:    GENERAL: Well appearing, in no acute distress, alert and oriented x3.  PSYCH:  Mood and affect appropriate.  SKIN: Skin color, texture, turgor normal, no rashes or lesions which will impact the procedure.  CV: RRR with palpation of the radial artery.  PULM: No evidence of respiratory difficulty, symmetric chest rise. Clear to auscultation.  NEURO: Cranial nerves grossly intact.    Plan:    Proceed with procedure as planned Procedure(s) (LRB):  RADIOFREQUENCY ABLATION, RIGHT L3-L4 AND L5 MEDIAL BRANCH TWO OF TWO (Right)    Belal Alammar  09/27/2023

## 2023-09-28 NOTE — OP NOTE
Therapeutic Lumbar Medial Branch Radiofrequency Ablation under Fluoroscopy     The procedure, risks, benefits, and options were discussed with the patient. There are no contraindications to the procedure. The patent expressed understanding and agreed to the procedure. Informed written consent was obtained prior to the start of the procedure and can be found in the patient's chart.        PATIENT NAME: Raciel White   MRN: [id number]     DATE OF PROCEDURE: 09/28/2023     PROCEDURE:  Right L3, L4, and L5 Lumbar Radiofrequency Ablation under Fluoroscopy    PRE-OP DIAGNOSIS: Lumbar spondylosis [M47.816] Lumbar spondylosis [M47.816]    POST-OP DIAGNOSIS: Same    PHYSICIAN: Seamus Soetlo MD    ASSISTANTS: MD Jocy     MEDICATIONS INJECTED:  Preservative-free Decadron 10mg with 2cc of Bupivicaine 0.25%    LOCAL ANESTHETIC INJECTED:   Xylocaine 2%    SEDATION: Versed 2mg and Fentanyl 50mcg                                                                                                                                                                                     Conscious sedation ordered by M.D. Patient re-evaluation prior to administration of conscious sedation. No changes noted in patient's status from initial evaluation. The patient's vital signs were monitored by RN and patient remained hemodynamically stable throughout the procedure.    Event Time In   Sedation Start 1342   Sedation End 1353       ESTIMATED BLOOD LOSS:  None    COMPLICATIONS:  None     INTERVAL HISTORY: Patient has clinical and imaging findings suggestive of facet mediated pain. Patients has completed 2 previous diagnostic medial branch blocks at specified levels with at least 80% relief for the expected duration of the local anesthetic utilized.    TECHNIQUE: Time-out was performed to identify the patient and procedure to be performed. With the patient laying in a prone position, the surgical area was prepped and draped in the usual sterile  fashion using ChloraPrep and fenestrated drape. The levels were determined under fluoroscopic guidance. Skin anesthesia was achieved by injecting Lidocaine 2% over the injection sites. A 20 gauge 10mm curved active tip needle was introduced to the anatomic local of the medial branch at each of the above levels using AP, lateral and/or contralateral oblique fluoroscopic imaging. Then sensory and motor testing was performed to confirm that the needle tips were in the correct location. After negative aspiration for blood or CSF was confirmed, 1 mL of the lidocaine 2% listed above was injected slowly at each site. This was followed by thermal lesioning at 80 degrees celsius for 90 seconds. That was followed by slowly injecting 1 mL of the medication mixture listed above at each site. The needles were removed and bleeding was nil. A sterile dressing was applied. No specimens collected. The patient tolerated the procedure well and did not have any procedure related motor deficit at the conclusion of the procedure.      The patient was monitored after the procedure in the recovery area. They were given post-procedure and discharge instructions to follow at home. The patient was discharged in a stable condition.        Chandan Sandra MD     I have reviewed and concur with the resident's history, physical, assessment, and plan.  I have personally interviewed and examined the patient at bedside.  See below addendum for my evaluation and additional findings.    Seamus Sotelo MD

## 2023-10-04 ENCOUNTER — TELEPHONE (OUTPATIENT)
Dept: FAMILY MEDICINE | Facility: CLINIC | Age: 53
End: 2023-10-04
Payer: OTHER GOVERNMENT

## 2023-10-04 NOTE — TELEPHONE ENCOUNTER
----- Message from Yael Charles sent at 10/4/2023  9:21 AM CDT -----  Contact: pt  Type: Needs Medical Advice  Who Called:  pt  Best Call Back Number: [phone]    Additional Information: Pt is calling the office trying to get an appt later in the morning right before 11am appt.Please call back and advise.         Bedside and Verbal shift change report given to Ankit Valdez RN (oncoming nurse) by Corky Barron RN (offgoing nurse).

## 2023-10-04 NOTE — TELEPHONE ENCOUNTER
Spoke to pt. He wants to be at a later time tomorrow. I explained  is booked tomorrow. He verbalized understanding and will keep his appt.

## 2023-10-05 ENCOUNTER — OFFICE VISIT (OUTPATIENT)
Dept: PAIN MEDICINE | Facility: CLINIC | Age: 53
End: 2023-10-05
Payer: OTHER GOVERNMENT

## 2023-10-05 ENCOUNTER — OFFICE VISIT (OUTPATIENT)
Dept: FAMILY MEDICINE | Facility: CLINIC | Age: 53
End: 2023-10-05
Payer: OTHER GOVERNMENT

## 2023-10-05 VITALS
BODY MASS INDEX: 30.44 KG/M2 | DIASTOLIC BLOOD PRESSURE: 84 MMHG | WEIGHT: 237.19 LBS | HEART RATE: 80 BPM | SYSTOLIC BLOOD PRESSURE: 140 MMHG | HEIGHT: 74 IN

## 2023-10-05 VITALS
BODY MASS INDEX: 32.35 KG/M2 | SYSTOLIC BLOOD PRESSURE: 110 MMHG | OXYGEN SATURATION: 97 % | DIASTOLIC BLOOD PRESSURE: 72 MMHG | HEART RATE: 87 BPM | WEIGHT: 238.88 LBS | HEIGHT: 72 IN

## 2023-10-05 DIAGNOSIS — M51.36 DDD (DEGENERATIVE DISC DISEASE), LUMBAR: ICD-10-CM

## 2023-10-05 DIAGNOSIS — M47.816 LUMBAR SPONDYLOSIS: ICD-10-CM

## 2023-10-05 DIAGNOSIS — Z00.00 WELLNESS EXAMINATION: Primary | ICD-10-CM

## 2023-10-05 DIAGNOSIS — M54.12 CERVICAL RADICULOPATHY: Primary | ICD-10-CM

## 2023-10-05 DIAGNOSIS — M50.30 DDD (DEGENERATIVE DISC DISEASE), CERVICAL: ICD-10-CM

## 2023-10-05 DIAGNOSIS — K21.9 GASTROESOPHAGEAL REFLUX DISEASE, UNSPECIFIED WHETHER ESOPHAGITIS PRESENT: ICD-10-CM

## 2023-10-05 DIAGNOSIS — E66.09 CLASS 1 OBESITY DUE TO EXCESS CALORIES WITH SERIOUS COMORBIDITY AND BODY MASS INDEX (BMI) OF 32.0 TO 32.9 IN ADULT: ICD-10-CM

## 2023-10-05 DIAGNOSIS — M10.9 GOUT, UNSPECIFIED CAUSE, UNSPECIFIED CHRONICITY, UNSPECIFIED SITE: ICD-10-CM

## 2023-10-05 PROBLEM — E66.811 CLASS 1 OBESITY DUE TO EXCESS CALORIES WITH SERIOUS COMORBIDITY AND BODY MASS INDEX (BMI) OF 32.0 TO 32.9 IN ADULT: Status: ACTIVE | Noted: 2023-10-05

## 2023-10-05 PROCEDURE — 99396 PREV VISIT EST AGE 40-64: CPT | Mod: S$PBB,,, | Performed by: STUDENT IN AN ORGANIZED HEALTH CARE EDUCATION/TRAINING PROGRAM

## 2023-10-05 PROCEDURE — 99999 PR PBB SHADOW E&M-EST. PATIENT-LVL III: ICD-10-PCS | Mod: PBBFAC,,, | Performed by: STUDENT IN AN ORGANIZED HEALTH CARE EDUCATION/TRAINING PROGRAM

## 2023-10-05 PROCEDURE — 99999 PR PBB SHADOW E&M-EST. PATIENT-LVL III: CPT | Mod: PBBFAC,,, | Performed by: PHYSICIAN ASSISTANT

## 2023-10-05 PROCEDURE — 99213 OFFICE O/P EST LOW 20 MIN: CPT | Mod: PBBFAC,27,PO | Performed by: PHYSICIAN ASSISTANT

## 2023-10-05 PROCEDURE — 99999 PR PBB SHADOW E&M-EST. PATIENT-LVL III: ICD-10-PCS | Mod: PBBFAC,,, | Performed by: PHYSICIAN ASSISTANT

## 2023-10-05 PROCEDURE — 99999 PR PBB SHADOW E&M-EST. PATIENT-LVL III: CPT | Mod: PBBFAC,,, | Performed by: STUDENT IN AN ORGANIZED HEALTH CARE EDUCATION/TRAINING PROGRAM

## 2023-10-05 PROCEDURE — 99214 OFFICE O/P EST MOD 30 MIN: CPT | Mod: 24,S$PBB,, | Performed by: PHYSICIAN ASSISTANT

## 2023-10-05 PROCEDURE — 99396 PR PREVENTIVE VISIT,EST,40-64: ICD-10-PCS | Mod: S$PBB,,, | Performed by: STUDENT IN AN ORGANIZED HEALTH CARE EDUCATION/TRAINING PROGRAM

## 2023-10-05 PROCEDURE — 99213 OFFICE O/P EST LOW 20 MIN: CPT | Mod: PBBFAC,PO | Performed by: STUDENT IN AN ORGANIZED HEALTH CARE EDUCATION/TRAINING PROGRAM

## 2023-10-05 PROCEDURE — 99214 PR OFFICE/OUTPT VISIT, EST, LEVL IV, 30-39 MIN: ICD-10-PCS | Mod: 24,S$PBB,, | Performed by: PHYSICIAN ASSISTANT

## 2023-10-05 RX ORDER — CYCLOBENZAPRINE HCL 10 MG
10 TABLET ORAL DAILY PRN
Qty: 90 TABLET | Refills: 3 | Status: SHIPPED | OUTPATIENT
Start: 2023-10-05

## 2023-10-05 RX ORDER — TRIAMCINOLONE ACETONIDE 0.25 MG/G
1 CREAM TOPICAL 2 TIMES DAILY PRN
COMMUNITY
End: 2023-10-05 | Stop reason: SDUPTHER

## 2023-10-05 RX ORDER — CYCLOBENZAPRINE HCL 10 MG
10 TABLET ORAL
COMMUNITY
End: 2023-10-05 | Stop reason: SDUPTHER

## 2023-10-05 RX ORDER — TRIAMCINOLONE ACETONIDE 0.25 MG/G
1 CREAM TOPICAL 2 TIMES DAILY PRN
Qty: 15 G | Refills: 11 | Status: SHIPPED | OUTPATIENT
Start: 2023-10-05

## 2023-10-05 RX ORDER — SODIUM CHLORIDE, SODIUM LACTATE, POTASSIUM CHLORIDE, CALCIUM CHLORIDE 600; 310; 30; 20 MG/100ML; MG/100ML; MG/100ML; MG/100ML
INJECTION, SOLUTION INTRAVENOUS CONTINUOUS
Status: CANCELLED | OUTPATIENT
Start: 2023-10-05

## 2023-10-05 NOTE — ASSESSMENT & PLAN NOTE
Worsening. Discussed dietary approach to weight loss - keep a food diary three times per week and use that information to cut 500 calories per day from daily caloric intake. Patient can expect 1-2 lbs of weight loss per week for as long as six months with these changes.

## 2023-10-05 NOTE — PROGRESS NOTES
Pain management Follow-Up Visit     CC:  Neck pain, low back pain    HPI:  The patient is a 52-year-old male with past medical history significant for C5/6 cervical spine surgery in 2008 who presents in referral from Bhavna Wild PA-C for left upper back pain.  He is status post C7-T1 interlaminar epidural steroid injection on 04/14/2022 with 75% relief lasting greater than 1 year.  He is status post left L4/5 and L5/S1 medial branch radiofrequency ablation on 09/14/2023 and right L3/4 and L4/5 medial branch radiofrequency ablation on 09/28/2023 with Dr. Sotelo with 100% relief.  He currently denies any low back pain.  His main complaint today is right greater than left neck pain radiating to his trapezius muscles.  He would like to repeat an epidural steroid injection.  He denies having weakness or numbness.  He has moved back to the [facility] in would like to continue his care here.    Prior HPI:  The patient is a 49-year-old male with past medical history significant for C5/6 cervical spine surgery in 2008 who presents in referral from Bhavna Wild PA-C for left upper back pain.  He is status post L5/S1 interlaminar epidural steroid injection on 01/27/2020 with 100% relief of his leg pain.  However, the patient continues to have back pain.  He describes pain across the bilateral low back that is aching, worse with movement and also with lying down too long.  He does have improvement with Flexeril and lidocaine patches.  He also complains of bilateral neck pain radiating to the trapezius muscles but currently his back is much worse than his neck.  He denies weakness, numbness, bladder or bowel incontinence.    Previous history:  He reports having a very mild baseline degree of pain that has been present since his neck surgery in 2008 in the  but this was very tolerable until December 2018 when he developed severe pain in the left scapular region.  He does not have significant neck pain  today but reports constant pain in the left scapular region.  This is significantly worse with looking to the left and looking up.  He describes it is grabbing, tight, also worse 1st things in the morning and improved with lying down.  He has been taking Flexeril without significant relief and reports drowsiness with this.  He has some numbness and weakness in the left arm and hand in an ulnar distribution.  He denies bladder or bowel incontinence.    Pain intervention history:   In about 2010, he had undergone a lumbar injection which sounds like an epidural steroid injection and reports having almost 100% relief with this for one year.  He is status post L5/S1 VONNIE on 12/13/2019 with 70% relief of his left leg pain, 50% relief of his back pain. In January 2020, over a year ago, he had a L5/S1 LESI with improvement of his radicular lower extremity pain. left L3, 4, 5 RFA on 02/05/2021 and 02/19/2021 with significant pain relief of his axial back pain with greater than 50-60% pain improvement. Patient is s/p C7/T1 ILESI on 4/9/21 with good results with more than 50 % improvement in pain and significant improvement in function and ROM.   He is status post C7-T1 interlaminar epidural steroid injection on 04/14/2022 with 75% relief lasting greater than 1 year.  He is status post left L4/5 and L5/S1 medial branch radiofrequency ablation on 09/14/2023 and right L3/4 and L4/5 medial branch radiofrequency ablation on 09/28/2023 with Dr. Sotelo with 100% relief.     Spine surgeries: C5-6 artificial disc/flexible disc spacer in 2008.     Antineuropathics:  NSAIDs:  Naproxen  Physical therapy:  Completed physical therapy in the past, continues home exercise program  Antidepressants:  Muscle relaxers:  Flexeril 10 mg as needed, not daily  Opioids:  Antiplatelets/Anticoagulants:    ROS:  The patient reports left upper back pain.  Balance of review of systems is negative.    Past Medical History:   Diagnosis Date    Arthritis      GERD (gastroesophageal reflux disease)     Insomnia     Sleep apnea     cpap       Past Surgical History:   Procedure Laterality Date    COLONOSCOPY  2011    COLONOSCOPY N/A 07/07/2023    Procedure: COLONOSCOPY;  Surgeon: Dk Paul MD;  Location: [facility] ENDO;  Service: Endoscopy;  Laterality: N/A;    EPIDURAL STEROID INJECTION N/A 04/09/2021    Procedure: INJECTION, STEROID, EPIDURAL, C7-T1;  Surgeon: Seamus Sotelo MD;  Location: [facility] PAIN MGT;  Service: Pain Management;  Laterality: N/A;    EPIDURAL STEROID INJECTION N/A 04/14/2022    Procedure: Injection, Steroid, Epidural C7/T1 SCHEDULE 1 WEEK AFTER RFA PER JENNIFER MAHONEY;  Surgeon: Seamus Sotelo MD;  Location: [facility] PAIN MGT;  Service: Pain Management;  Laterality: N/A;    EPIDURAL STEROID INJECTION INTO LUMBAR SPINE N/A 12/13/2019    Procedure: Injection-steroid-epidural-lumbar;  Surgeon: Juan Carlos Romeo MD;  Location: [facility] OR;  Service: Pain Management;  Laterality: N/A;  L5/S1 L>R    EPIDURAL STEROID INJECTION INTO LUMBAR SPINE N/A 01/27/2020    Procedure: Injection-steroid-epidural-lumbar L5/S1;  Surgeon: Juan Carlos Romeo MD;  Location: [facility] OR;  Service: Pain Management;  Laterality: N/A;    INJECTION OF ANESTHETIC AGENT AROUND MEDIAL BRANCH NERVES INNERVATING LUMBAR FACET JOINT Bilateral 09/25/2020    Procedure: Block-nerve-medial branch-lumbar L3, L4, L5;  Surgeon: Juan Carlos Romeo MD;  Location: [facility] OR;  Service: Pain Management;  Laterality: Bilateral;    INJECTION OF ANESTHETIC AGENT AROUND MEDIAL BRANCH NERVES INNERVATING LUMBAR FACET JOINT Bilateral 10/20/2020    Procedure: Block-nerve-medial branch-lumbar, L3, 4, 5;  Surgeon: Juan Carlos Romeo MD;  Location: [facility] OR;  Service: Pain Management;  Laterality: Bilateral;    JOINT REPLACEMENT  2007    C7    LARYNGOSCOPY Bilateral 11/02/2018    Procedure: Sleep endoscopy;  Surgeon: Brandin Rosen MD;  Location: [facility] OR;  Service: ENT;  Laterality: Bilateral;    MYOTOMY  AND SUSPENSION OF HYOID Bilateral 09/27/2018    Procedure: Hyoid suspension;  Surgeon: Brandin Rosen MD;  Location: [facility] OR;  Service: ENT;  Laterality: Bilateral;    RADIOFREQUENCY ABLATION Right 02/05/2021    Procedure: RADIOFREQUENCY ABLATION RIGHT L3,4.5 1 of 2;  Surgeon: Seamus Sotelo MD;  Location: [facility] PAIN MGT;  Service: Pain Management;  Laterality: Right;    RADIOFREQUENCY ABLATION Left 02/19/2021    Procedure: RADIOFREQUENCY ABLATION LEFT L3,4,5 2 of 2;  Surgeon: Seamus Sotelo MD;  Location: [facility] PAIN MGT;  Service: Pain Management;  Laterality: Left;    RADIOFREQUENCY ABLATION Left 02/10/2022    Procedure: Radiofrequency Ablation LEFT L3,4,5;  Surgeon: Seamus Sotelo MD;  Location: [facility] PAIN MGT;  Service: Pain Management;  Laterality: Left;    RADIOFREQUENCY ABLATION Right 02/17/2022    Procedure: Radiofrequency Ablation RIGHT L3,4,5 SCHEDULE 1 WEEK AFTER LEFT SIDE PER JENNIFER VERGARA;  Surgeon: Seamus Sotelo MD;  Location: [facility] PAIN MGT;  Service: Pain Management;  Laterality: Right;    RADIOFREQUENCY ABLATION Left 09/14/2023    Procedure: RADIOFREQUENCY ABLATION, LEFT L3-L4 AND L5 MEDIAL BRANCH ONE OF TWO;  Surgeon: Seamus Sotelo MD;  Location: [facility] PAIN MGT;  Service: Pain Management;  Laterality: Left;    RADIOFREQUENCY ABLATION Right 09/28/2023    Procedure: RADIOFREQUENCY ABLATION, RIGHT L3-L4 AND L5 MEDIAL BRANCH TWO OF TWO;  Surgeon: Seamus Sotelo MD;  Location: [facility] PAIN MGT;  Service: Pain Management;  Laterality: Right;    SPINE SURGERY  2008    Neck fusion C6/7??    VASECTOMY  2003       Social History     Socioeconomic History    Marital status:    Occupational History    Occupation: [employer]   Tobacco Use    Smoking status: Former     Types: Vaping with nicotine    Smokeless tobacco: Former   Substance and Sexual Activity    Alcohol use: Yes     Alcohol/week: 12.0 standard drinks of alcohol     Types: 12 Cans of beer per week     Comment: weekly    Drug  use: No    Sexual activity: Yes     Partners: Female     Birth control/protection: Partner-Vasectomy   Social History Narrative    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          Medications/Allergies: See med card    Physical exam: (from previous visit)  Gen: A and O x3, pleasant, well-groomed  Skin: No rashes or obvious lesions  HEENT: PERRLA, no obvious deformities on ears or in canals.Trachea midline.  CVS: Regular rate and rhythm, normal palpable pulses.  Resp: Clear to auscultation bilaterally, no wheezes or rales.  Abdomen: Soft, NT/ND.  Musculoskeletal: Able to heel walk, toe walk. No antalgic gait.     Neuro:  Upper extremities: 5/5 strength bilaterally   Reflexes: Brachioradialis 1+, Bicep 1+, Tricep 1+.   Sensory: Intact and symmetrical to light touch and pinprick in C2-T1 dermatomes bilaterally.    Cervical Spine:  Cervical spine: ROM is full in flexion, extension and lateral rotation with bilateral neck pain, worse with extension.  Spurling's maneuver causes no neck pain to either side.  Myofascial exam: No Tenderness to palpation across cervical paraspinous region bilaterally.     Lumbar exam:  Range of motion mildly reduced with flexion and extension without increased pain.  Straight leg raise is negative bilaterally.  Michael's test negative bilaterally.  Internal and external rotation of the hips is negative bilaterally.  Myofascial exam:  No tenderness to palpation of the lumbar paraspinous muscles.      Imagin2019 CT cervical spine  Vertebral column: Redemonstrated are postsurgical changes of anterior interbody fusion of C5 and C6.  Fusion hardware does result in streak artifact.  There is no suspected loosening or infection.  The vertebral bodies maintain normal height and alignment.  The odontoid process is intact.  There is mild disc space narrowing at the C4-5 and C6-7 levels.  Spinal canal, cord, epidural space: The spinal canal is developmentally normal.  There is no  obvious abnormal epidural mass or fluid collection.  Findings by level:  C1-2: Alignment is normal.  There is no significant joint space narrowing.  C2-3: There is no spinal canal or foraminal stenosis.  C3-4: There is a minimal disc bulge. There is mild bilateral uncovertebral spurring and mild facet joint arthropathy. There is no spinal stenosis. There may be mild right foraminal stenosis.  C4-5: There is no spinal canal or significant foraminal stenosis.  C5-6: There is bilateral uncovertebral spurring. There is a mild disc osteophyte. There is artifact related to fusion hardware. There is no significant spinal stenosis. There is mild-to-moderate right and mild left foraminal stenosis suspected.  C6-7: There is left greater than right uncovertebral spurring with mild facet joint arthropathy.  There is a mild disc osteophyte complex.  There is no spinal stenosis.  There is mild bilateral foraminal stenosis.  C7-T1: There is mild facet joint arthropathy.  There is no spinal canal or significant foraminal stenosis.    11/26/19 MRI C-spine:  C2-C3: No significant central canal or neural foraminal narrowing.  C3-C4: Mild right uncovertebral spurring and bilateral facet arthropathy resulting and mild right-sided neural foraminal narrowing.  No significant spinal canal narrowing.  C4-C7 levels are obscured by beam hardening artifact.  C7-T1: No significant central canal or neural foraminal narrowing.    11/26/19 MRI L-spine:  Vertebrae: Mild concavity of the superior endplate of L1 with prominent Schmorl's node.  Hemangioma noted within the L5 vertebral body.  Degenerative endplate changes are noted at L5-S1.  Otherwise, vertebrae demonstrate normal marrow signal without fracture or destructive process.  Discs: Mild disc space narrowing at L5-S1 with disc desiccation.  Remaining discs appearance.  Cord: Visualized cord demonstrates normal signal.  Conus terminates at L2.  Degenerative findings:  T12-L4: No significant  central canal or neural foraminal narrowing.  L4-5: Broad-based disc bulge with central annular fissure and disc protrusion and bilateral facet arthropathy, resulting in mild bilateral neural foraminal narrowing.  L5-S1: Broad-based disc bulge with left paracentral annular fissure and disc protrusion and bilateral facet arthropathy resulting in mild bilateral neural foraminal narrowing.    Lab Results   Component Value Date    WBC 10.57 02/05/2023    HGB 14.6 02/05/2023    HCT 42.3 02/05/2023    MCV 92 02/05/2023     02/05/2023       CMP  Sodium   Date Value Ref Range Status   03/02/2023 141 136 - 145 mmol/L Final     Potassium   Date Value Ref Range Status   03/02/2023 4.8 3.5 - 5.1 mmol/L Final     Chloride   Date Value Ref Range Status   03/02/2023 106 95 - 110 mmol/L Final     CO2   Date Value Ref Range Status   03/02/2023 27 23 - 29 mmol/L Final     Glucose   Date Value Ref Range Status   03/02/2023 112 (H) 70 - 110 mg/dL Final     BUN   Date Value Ref Range Status   03/02/2023 21 (H) 6 - 20 mg/dL Final     Creatinine   Date Value Ref Range Status   03/02/2023 0.9 0.5 - 1.4 mg/dL Final     Calcium   Date Value Ref Range Status   03/02/2023 10.1 8.7 - 10.5 mg/dL Final     Total Protein   Date Value Ref Range Status   03/02/2023 7.6 6.0 - 8.4 g/dL Final     Albumin   Date Value Ref Range Status   03/02/2023 4.3 3.5 - 5.2 g/dL Final     Total Bilirubin   Date Value Ref Range Status   03/02/2023 0.6 0.1 - 1.0 mg/dL Final     Comment:     For infants and newborns, interpretation of results should be based  on gestational age, weight and in agreement with clinical  observations.    Premature Infant recommended reference ranges:  Up to 24 hours.............<8.0 mg/dL  Up to 48 hours............<12.0 mg/dL  3-5 days..................<15.0 mg/dL  6-29 days.................<15.0 mg/dL       Alkaline Phosphatase   Date Value Ref Range Status   03/02/2023 58 55 - 135 U/L Final     AST   Date Value Ref Range Status    03/02/2023 27 10 - 40 U/L Final     ALT   Date Value Ref Range Status   03/02/2023 49 (H) 10 - 44 U/L Final     Anion Gap   Date Value Ref Range Status   03/02/2023 8 8 - 16 mmol/L Final     eGFR if    Date Value Ref Range Status   11/08/2019 >60.0 >60 mL/min/1.73 m^2 Final     eGFR if non    Date Value Ref Range Status   11/08/2019 >60.0 >60 mL/min/1.73 m^2 Final     Comment:     Calculation used to obtain the estimated glomerular filtration  rate (eGFR) is the CKD-EPI equation.        Lab Results   Component Value Date    HGBA1C 5.6 03/02/2023       Assessment:  The patient is a 52-year-old male with chronic neck and low back pain consistent with:    1. Cervical radiculopathy        2. DDD (degenerative disc disease), cervical        3. Lumbar spondylosis        4. DDD (degenerative disc disease), lumbar          Plan:  1. The patient had complete relief of his low back pain following the lumbar medial branch radiofrequency ablation.  We can repeat this in the future for him.    2. For his returning neck pain I will schedule him to repeat a C7-T1 interlaminar epidural steroid injection.  He had over 1 year of relief with his last injection.  3. Follow-up in 4 weeks postprocedure or sooner as needed.

## 2023-10-05 NOTE — H&P (VIEW-ONLY)
Pain management Follow-Up Visit     CC:  Neck pain, low back pain    HPI:  The patient is a 52-year-old male with past medical history significant for C5/6 cervical spine surgery in 2008 who presents in referral from Bhavna Wild PA-C for left upper back pain.  He is status post C7-T1 interlaminar epidural steroid injection on 04/14/2022 with 75% relief lasting greater than 1 year.  He is status post left L4/5 and L5/S1 medial branch radiofrequency ablation on 09/14/2023 and right L3/4 and L4/5 medial branch radiofrequency ablation on 09/28/2023 with Dr. Sotelo with 100% relief.  He currently denies any low back pain.  His main complaint today is right greater than left neck pain radiating to his trapezius muscles.  He would like to repeat an epidural steroid injection.  He denies having weakness or numbness.  He has moved back to the [facility] in would like to continue his care here.    Prior HPI:  The patient is a 49-year-old male with past medical history significant for C5/6 cervical spine surgery in 2008 who presents in referral from Bhavna Wild PA-C for left upper back pain.  He is status post L5/S1 interlaminar epidural steroid injection on 01/27/2020 with 100% relief of his leg pain.  However, the patient continues to have back pain.  He describes pain across the bilateral low back that is aching, worse with movement and also with lying down too long.  He does have improvement with Flexeril and lidocaine patches.  He also complains of bilateral neck pain radiating to the trapezius muscles but currently his back is much worse than his neck.  He denies weakness, numbness, bladder or bowel incontinence.    Previous history:  He reports having a very mild baseline degree of pain that has been present since his neck surgery in 2008 in the  but this was very tolerable until December 2018 when he developed severe pain in the left scapular region.  He does not have significant neck pain  today but reports constant pain in the left scapular region.  This is significantly worse with looking to the left and looking up.  He describes it is grabbing, tight, also worse 1st things in the morning and improved with lying down.  He has been taking Flexeril without significant relief and reports drowsiness with this.  He has some numbness and weakness in the left arm and hand in an ulnar distribution.  He denies bladder or bowel incontinence.    Pain intervention history:   In about 2010, he had undergone a lumbar injection which sounds like an epidural steroid injection and reports having almost 100% relief with this for one year.  He is status post L5/S1 VONNIE on 12/13/2019 with 70% relief of his left leg pain, 50% relief of his back pain. In January 2020, over a year ago, he had a L5/S1 LESI with improvement of his radicular lower extremity pain. left L3, 4, 5 RFA on 02/05/2021 and 02/19/2021 with significant pain relief of his axial back pain with greater than 50-60% pain improvement. Patient is s/p C7/T1 ILESI on 4/9/21 with good results with more than 50 % improvement in pain and significant improvement in function and ROM.   He is status post C7-T1 interlaminar epidural steroid injection on 04/14/2022 with 75% relief lasting greater than 1 year.  He is status post left L4/5 and L5/S1 medial branch radiofrequency ablation on 09/14/2023 and right L3/4 and L4/5 medial branch radiofrequency ablation on 09/28/2023 with Dr. Sotelo with 100% relief.     Spine surgeries: C5-6 artificial disc/flexible disc spacer in 2008.     Antineuropathics:  NSAIDs:  Naproxen  Physical therapy:  Completed physical therapy in the past, continues home exercise program  Antidepressants:  Muscle relaxers:  Flexeril 10 mg as needed, not daily  Opioids:  Antiplatelets/Anticoagulants:    ROS:  The patient reports left upper back pain.  Balance of review of systems is negative.    Past Medical History:   Diagnosis Date    Arthritis      GERD (gastroesophageal reflux disease)     Insomnia     Sleep apnea     cpap       Past Surgical History:   Procedure Laterality Date    COLONOSCOPY  2011    COLONOSCOPY N/A 07/07/2023    Procedure: COLONOSCOPY;  Surgeon: Dk Paul MD;  Location: [facility] ENDO;  Service: Endoscopy;  Laterality: N/A;    EPIDURAL STEROID INJECTION N/A 04/09/2021    Procedure: INJECTION, STEROID, EPIDURAL, C7-T1;  Surgeon: Seamus Sotelo MD;  Location: [facility] PAIN MGT;  Service: Pain Management;  Laterality: N/A;    EPIDURAL STEROID INJECTION N/A 04/14/2022    Procedure: Injection, Steroid, Epidural C7/T1 SCHEDULE 1 WEEK AFTER RFA PER JENNIFER MAHONEY;  Surgeon: Seamus Sotelo MD;  Location: [facility] PAIN MGT;  Service: Pain Management;  Laterality: N/A;    EPIDURAL STEROID INJECTION INTO LUMBAR SPINE N/A 12/13/2019    Procedure: Injection-steroid-epidural-lumbar;  Surgeon: Juan Carlos Romeo MD;  Location: [facility] OR;  Service: Pain Management;  Laterality: N/A;  L5/S1 L>R    EPIDURAL STEROID INJECTION INTO LUMBAR SPINE N/A 01/27/2020    Procedure: Injection-steroid-epidural-lumbar L5/S1;  Surgeon: Juan Carlos Romeo MD;  Location: [facility] OR;  Service: Pain Management;  Laterality: N/A;    INJECTION OF ANESTHETIC AGENT AROUND MEDIAL BRANCH NERVES INNERVATING LUMBAR FACET JOINT Bilateral 09/25/2020    Procedure: Block-nerve-medial branch-lumbar L3, L4, L5;  Surgeon: Juan Carlos Romeo MD;  Location: [facility] OR;  Service: Pain Management;  Laterality: Bilateral;    INJECTION OF ANESTHETIC AGENT AROUND MEDIAL BRANCH NERVES INNERVATING LUMBAR FACET JOINT Bilateral 10/20/2020    Procedure: Block-nerve-medial branch-lumbar, L3, 4, 5;  Surgeon: Juan Carlos Romeo MD;  Location: [facility] OR;  Service: Pain Management;  Laterality: Bilateral;    JOINT REPLACEMENT  2007    C7    LARYNGOSCOPY Bilateral 11/02/2018    Procedure: Sleep endoscopy;  Surgeon: Brandin Rosen MD;  Location: [facility] OR;  Service: ENT;  Laterality: Bilateral;    MYOTOMY  AND SUSPENSION OF HYOID Bilateral 09/27/2018    Procedure: Hyoid suspension;  Surgeon: Brandin Rosen MD;  Location: [facility] OR;  Service: ENT;  Laterality: Bilateral;    RADIOFREQUENCY ABLATION Right 02/05/2021    Procedure: RADIOFREQUENCY ABLATION RIGHT L3,4.5 1 of 2;  Surgeon: Seamus Sotelo MD;  Location: [facility] PAIN MGT;  Service: Pain Management;  Laterality: Right;    RADIOFREQUENCY ABLATION Left 02/19/2021    Procedure: RADIOFREQUENCY ABLATION LEFT L3,4,5 2 of 2;  Surgeon: Seamus Sotelo MD;  Location: [facility] PAIN MGT;  Service: Pain Management;  Laterality: Left;    RADIOFREQUENCY ABLATION Left 02/10/2022    Procedure: Radiofrequency Ablation LEFT L3,4,5;  Surgeon: Seamus Sotelo MD;  Location: [facility] PAIN MGT;  Service: Pain Management;  Laterality: Left;    RADIOFREQUENCY ABLATION Right 02/17/2022    Procedure: Radiofrequency Ablation RIGHT L3,4,5 SCHEDULE 1 WEEK AFTER LEFT SIDE PER JENNIFER VERGARA;  Surgeon: Seamus Sotelo MD;  Location: [facility] PAIN MGT;  Service: Pain Management;  Laterality: Right;    RADIOFREQUENCY ABLATION Left 09/14/2023    Procedure: RADIOFREQUENCY ABLATION, LEFT L3-L4 AND L5 MEDIAL BRANCH ONE OF TWO;  Surgeon: Seamus Sotelo MD;  Location: [facility] PAIN MGT;  Service: Pain Management;  Laterality: Left;    RADIOFREQUENCY ABLATION Right 09/28/2023    Procedure: RADIOFREQUENCY ABLATION, RIGHT L3-L4 AND L5 MEDIAL BRANCH TWO OF TWO;  Surgeon: Seamus Sotelo MD;  Location: [facility] PAIN MGT;  Service: Pain Management;  Laterality: Right;    SPINE SURGERY  2008    Neck fusion C6/7??    VASECTOMY  2003       Social History     Socioeconomic History    Marital status:    Occupational History    Occupation: [employer]   Tobacco Use    Smoking status: Former     Types: Vaping with nicotine    Smokeless tobacco: Former   Substance and Sexual Activity    Alcohol use: Yes     Alcohol/week: 12.0 standard drinks of alcohol     Types: 12 Cans of beer per week     Comment: weekly    Drug  use: No    Sexual activity: Yes     Partners: Female     Birth control/protection: Partner-Vasectomy   Social History Narrative    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          Medications/Allergies: See med card    Physical exam: (from previous visit)  Gen: A and O x3, pleasant, well-groomed  Skin: No rashes or obvious lesions  HEENT: PERRLA, no obvious deformities on ears or in canals.Trachea midline.  CVS: Regular rate and rhythm, normal palpable pulses.  Resp: Clear to auscultation bilaterally, no wheezes or rales.  Abdomen: Soft, NT/ND.  Musculoskeletal: Able to heel walk, toe walk. No antalgic gait.     Neuro:  Upper extremities: 5/5 strength bilaterally   Reflexes: Brachioradialis 1+, Bicep 1+, Tricep 1+.   Sensory: Intact and symmetrical to light touch and pinprick in C2-T1 dermatomes bilaterally.    Cervical Spine:  Cervical spine: ROM is full in flexion, extension and lateral rotation with bilateral neck pain, worse with extension.  Spurling's maneuver causes no neck pain to either side.  Myofascial exam: No Tenderness to palpation across cervical paraspinous region bilaterally.     Lumbar exam:  Range of motion mildly reduced with flexion and extension without increased pain.  Straight leg raise is negative bilaterally.  Michael's test negative bilaterally.  Internal and external rotation of the hips is negative bilaterally.  Myofascial exam:  No tenderness to palpation of the lumbar paraspinous muscles.      Imagin2019 CT cervical spine  Vertebral column: Redemonstrated are postsurgical changes of anterior interbody fusion of C5 and C6.  Fusion hardware does result in streak artifact.  There is no suspected loosening or infection.  The vertebral bodies maintain normal height and alignment.  The odontoid process is intact.  There is mild disc space narrowing at the C4-5 and C6-7 levels.  Spinal canal, cord, epidural space: The spinal canal is developmentally normal.  There is no  obvious abnormal epidural mass or fluid collection.  Findings by level:  C1-2: Alignment is normal.  There is no significant joint space narrowing.  C2-3: There is no spinal canal or foraminal stenosis.  C3-4: There is a minimal disc bulge. There is mild bilateral uncovertebral spurring and mild facet joint arthropathy. There is no spinal stenosis. There may be mild right foraminal stenosis.  C4-5: There is no spinal canal or significant foraminal stenosis.  C5-6: There is bilateral uncovertebral spurring. There is a mild disc osteophyte. There is artifact related to fusion hardware. There is no significant spinal stenosis. There is mild-to-moderate right and mild left foraminal stenosis suspected.  C6-7: There is left greater than right uncovertebral spurring with mild facet joint arthropathy.  There is a mild disc osteophyte complex.  There is no spinal stenosis.  There is mild bilateral foraminal stenosis.  C7-T1: There is mild facet joint arthropathy.  There is no spinal canal or significant foraminal stenosis.    11/26/19 MRI C-spine:  C2-C3: No significant central canal or neural foraminal narrowing.  C3-C4: Mild right uncovertebral spurring and bilateral facet arthropathy resulting and mild right-sided neural foraminal narrowing.  No significant spinal canal narrowing.  C4-C7 levels are obscured by beam hardening artifact.  C7-T1: No significant central canal or neural foraminal narrowing.    11/26/19 MRI L-spine:  Vertebrae: Mild concavity of the superior endplate of L1 with prominent Schmorl's node.  Hemangioma noted within the L5 vertebral body.  Degenerative endplate changes are noted at L5-S1.  Otherwise, vertebrae demonstrate normal marrow signal without fracture or destructive process.  Discs: Mild disc space narrowing at L5-S1 with disc desiccation.  Remaining discs appearance.  Cord: Visualized cord demonstrates normal signal.  Conus terminates at L2.  Degenerative findings:  T12-L4: No significant  central canal or neural foraminal narrowing.  L4-5: Broad-based disc bulge with central annular fissure and disc protrusion and bilateral facet arthropathy, resulting in mild bilateral neural foraminal narrowing.  L5-S1: Broad-based disc bulge with left paracentral annular fissure and disc protrusion and bilateral facet arthropathy resulting in mild bilateral neural foraminal narrowing.    Lab Results   Component Value Date    WBC 10.57 02/05/2023    HGB 14.6 02/05/2023    HCT 42.3 02/05/2023    MCV 92 02/05/2023     02/05/2023       CMP  Sodium   Date Value Ref Range Status   03/02/2023 141 136 - 145 mmol/L Final     Potassium   Date Value Ref Range Status   03/02/2023 4.8 3.5 - 5.1 mmol/L Final     Chloride   Date Value Ref Range Status   03/02/2023 106 95 - 110 mmol/L Final     CO2   Date Value Ref Range Status   03/02/2023 27 23 - 29 mmol/L Final     Glucose   Date Value Ref Range Status   03/02/2023 112 (H) 70 - 110 mg/dL Final     BUN   Date Value Ref Range Status   03/02/2023 21 (H) 6 - 20 mg/dL Final     Creatinine   Date Value Ref Range Status   03/02/2023 0.9 0.5 - 1.4 mg/dL Final     Calcium   Date Value Ref Range Status   03/02/2023 10.1 8.7 - 10.5 mg/dL Final     Total Protein   Date Value Ref Range Status   03/02/2023 7.6 6.0 - 8.4 g/dL Final     Albumin   Date Value Ref Range Status   03/02/2023 4.3 3.5 - 5.2 g/dL Final     Total Bilirubin   Date Value Ref Range Status   03/02/2023 0.6 0.1 - 1.0 mg/dL Final     Comment:     For infants and newborns, interpretation of results should be based  on gestational age, weight and in agreement with clinical  observations.    Premature Infant recommended reference ranges:  Up to 24 hours.............<8.0 mg/dL  Up to 48 hours............<12.0 mg/dL  3-5 days..................<15.0 mg/dL  6-29 days.................<15.0 mg/dL       Alkaline Phosphatase   Date Value Ref Range Status   03/02/2023 58 55 - 135 U/L Final     AST   Date Value Ref Range Status    03/02/2023 27 10 - 40 U/L Final     ALT   Date Value Ref Range Status   03/02/2023 49 (H) 10 - 44 U/L Final     Anion Gap   Date Value Ref Range Status   03/02/2023 8 8 - 16 mmol/L Final     eGFR if    Date Value Ref Range Status   11/08/2019 >60.0 >60 mL/min/1.73 m^2 Final     eGFR if non    Date Value Ref Range Status   11/08/2019 >60.0 >60 mL/min/1.73 m^2 Final     Comment:     Calculation used to obtain the estimated glomerular filtration  rate (eGFR) is the CKD-EPI equation.        Lab Results   Component Value Date    HGBA1C 5.6 03/02/2023       Assessment:  The patient is a 52-year-old male with chronic neck and low back pain consistent with:    1. Cervical radiculopathy        2. DDD (degenerative disc disease), cervical        3. Lumbar spondylosis        4. DDD (degenerative disc disease), lumbar          Plan:  1. The patient had complete relief of his low back pain following the lumbar medial branch radiofrequency ablation.  We can repeat this in the future for him.    2. For his returning neck pain I will schedule him to repeat a C7-T1 interlaminar epidural steroid injection.  He had over 1 year of relief with his last injection.  3. Follow-up in 4 weeks postprocedure or sooner as needed.

## 2023-10-05 NOTE — PATIENT INSTRUCTIONS
For evidence-based information on weight loss through diet, visit the following website:  [email or web address]    To estimate the amount of calories you need in a day to maintain or lose weight:  https://www.calculator.net/calorie-calculator.html

## 2023-10-05 NOTE — PROGRESS NOTES
Name: Raciel White  MRN: [id number]  : 1970  PCP: Justice Waite MD    HPI  Patient presents to [facility] care. No acute concerns at this time. Medical, surgical, and social history reviewed and updated.     Review of Systems   Respiratory:  Negative for shortness of breath.    Cardiovascular:  Negative for chest pain.   Gastrointestinal:  Negative for constipation, diarrhea, nausea and vomiting.       Patient Active Problem List   Diagnosis    Rectal bleeding    ROMA (obstructive sleep apnea)    Lumbar radiculopathy    Lumbar spondylosis    Chronic pain    Cervical radiculopathy    DDD (degenerative disc disease), cervical    DDD (degenerative disc disease), lumbar    Gout    Gastroesophageal reflux disease    Class 1 obesity due to excess calories with serious comorbidity and body mass index (BMI) of 32.0 to 32.9 in adult       Vitals:    10/05/23 0848   BP: 110/72   Pulse: 87       Physical Exam  Constitutional:       General: He is not in acute distress.     Appearance: Normal appearance. He is well-developed.   HENT:      Head: Normocephalic and atraumatic.      Right Ear: External ear normal.      Left Ear: External ear normal.   Eyes:      Conjunctiva/sclera: Conjunctivae normal.   Cardiovascular:      Rate and Rhythm: Normal rate and regular rhythm.      Heart sounds: No murmur heard.     No friction rub. No gallop.   Pulmonary:      Effort: Pulmonary effort is normal. No respiratory distress.      Breath sounds: No wheezing, rhonchi or rales.   Abdominal:      General: Abdomen is flat. There is no distension.   Musculoskeletal:         General: No swelling or deformity.      Right lower leg: No edema.      Left lower leg: No edema.   Skin:     General: Skin is warm and dry.      Coloration: Skin is not jaundiced.   Neurological:      Mental Status: He is alert and oriented to person, place, and time. Mental status is at baseline.   Psychiatric:         Attention and Perception: Attention and  perception normal.         Mood and Affect: Mood normal.         Speech: Speech normal.         Behavior: Behavior normal. Behavior is cooperative.         Thought Content: Thought content normal.         Cognition and Memory: Cognition normal.         Judgment: Judgment normal.         1. Wellness examination    2. Class 1 obesity due to excess calories with serious comorbidity and body mass index (BMI) of 32.0 to 32.9 in adult  Assessment & Plan:  Worsening. Discussed dietary approach to weight loss - keep a food diary three times per week and use that information to cut 500 calories per day from daily caloric intake. Patient can expect 1-2 lbs of weight loss per week for as long as six months with these changes.       3. Gout, unspecified cause, unspecified chronicity, unspecified site  Assessment & Plan:  Patient has not had any gout flares since last visit. Continue allopurinol.      4. Gastroesophageal reflux disease, unspecified whether esophagitis present  Assessment & Plan:  Currently controlled with as needed Nexium. Continue.      5. DDD (degenerative disc disease), lumbar  Assessment & Plan:  Currently follow with pain management.      Other orders  -     triamcinolone acetonide 0.025% (KENALOG) 0.025 % cream; Apply 1 application  topically 2 (two) times daily as needed (rash).  Dispense: 15 g; Refill: 11        Follow up in 1 year. Patient had labs this past March that were normal. No need to repeat at this time. Declines vaccinations.    Justice Waite MD  10/05/2023

## 2023-10-18 ENCOUNTER — TELEPHONE (OUTPATIENT)
Dept: SPINE | Facility: CLINIC | Age: 53
End: 2023-10-18
Payer: OTHER GOVERNMENT

## 2023-10-18 NOTE — TELEPHONE ENCOUNTER
Staff contacted patient regarding confirming appointment on 10/19/23 with MD Royce. patient stated that he is in the process of switching doctors and will like to have appointment canceled.

## 2023-10-30 ENCOUNTER — HOSPITAL ENCOUNTER (OUTPATIENT)
Dept: RADIOLOGY | Facility: HOSPITAL | Age: 53
Discharge: HOME | End: 2023-10-30
Attending: ANESTHESIOLOGY | Admitting: ANESTHESIOLOGY
Payer: OTHER GOVERNMENT

## 2023-10-30 ENCOUNTER — HOSPITAL ENCOUNTER (OUTPATIENT)
Facility: HOSPITAL | Age: 53
Discharge: HOME | End: 2023-10-30
Attending: ANESTHESIOLOGY | Admitting: ANESTHESIOLOGY
Payer: OTHER GOVERNMENT

## 2023-10-30 DIAGNOSIS — M54.2 NECK PAIN: ICD-10-CM

## 2023-10-30 DIAGNOSIS — M54.12 CERVICAL RADICULOPATHY: ICD-10-CM

## 2023-10-30 PROCEDURE — 62321 NJX INTERLAMINAR CRV/THRC: CPT | Mod: PO | Performed by: ANESTHESIOLOGY

## 2023-10-30 PROCEDURE — 62321 NJX INTERLAMINAR CRV/THRC: CPT | Mod: ,,, | Performed by: ANESTHESIOLOGY

## 2023-10-30 PROCEDURE — 63600175 PHARM REV CODE 636 W HCPCS: Mod: PO | Performed by: ANESTHESIOLOGY

## 2023-10-30 PROCEDURE — 76000 FLUOROSCOPY <1 HR PHYS/QHP: CPT | Mod: TC,PO

## 2023-10-30 PROCEDURE — 25500020 PHARM REV CODE 255: Mod: PO | Performed by: ANESTHESIOLOGY

## 2023-10-30 PROCEDURE — A4216 STERILE WATER/SALINE, 10 ML: HCPCS | Mod: PO | Performed by: ANESTHESIOLOGY

## 2023-10-30 PROCEDURE — 62321 PR INJ CERV/THORAC, W/GUIDANCE: ICD-10-PCS | Mod: ,,, | Performed by: ANESTHESIOLOGY

## 2023-10-30 PROCEDURE — 25000003 PHARM REV CODE 250: Mod: PO | Performed by: ANESTHESIOLOGY

## 2023-10-30 RX ORDER — LIDOCAINE HYDROCHLORIDE 10 MG/ML
INJECTION, SOLUTION EPIDURAL; INFILTRATION; INTRACAUDAL; PERINEURAL
Status: DISCONTINUED | OUTPATIENT
Start: 2023-10-30 | End: 2023-10-30 | Stop reason: HOSPADM

## 2023-10-30 RX ORDER — SODIUM CHLORIDE, SODIUM LACTATE, POTASSIUM CHLORIDE, CALCIUM CHLORIDE 600; 310; 30; 20 MG/100ML; MG/100ML; MG/100ML; MG/100ML
INJECTION, SOLUTION INTRAVENOUS CONTINUOUS
Status: DISCONTINUED | OUTPATIENT
Start: 2023-10-30 | End: 2023-10-30 | Stop reason: HOSPADM

## 2023-10-30 RX ORDER — SODIUM CHLORIDE 9 MG/ML
INJECTION, SOLUTION INTRAMUSCULAR; INTRAVENOUS; SUBCUTANEOUS
Status: DISCONTINUED | OUTPATIENT
Start: 2023-10-30 | End: 2023-10-30 | Stop reason: HOSPADM

## 2023-10-30 RX ORDER — METHYLPREDNISOLONE ACETATE 80 MG/ML
INJECTION, SUSPENSION INTRA-ARTICULAR; INTRALESIONAL; INTRAMUSCULAR; SOFT TISSUE
Status: DISCONTINUED | OUTPATIENT
Start: 2023-10-30 | End: 2023-10-30 | Stop reason: HOSPADM

## 2023-10-30 RX ORDER — MIDAZOLAM HYDROCHLORIDE 2 MG/2ML
INJECTION, SOLUTION INTRAMUSCULAR; INTRAVENOUS
Status: DISCONTINUED | OUTPATIENT
Start: 2023-10-30 | End: 2023-10-30 | Stop reason: HOSPADM

## 2023-10-30 RX ADMIN — SODIUM CHLORIDE, POTASSIUM CHLORIDE, SODIUM LACTATE AND CALCIUM CHLORIDE: 600; 310; 30; 20 INJECTION, SOLUTION INTRAVENOUS at 02:10

## 2023-10-30 NOTE — DISCHARGE SUMMARY
[facility] - Surgery  Discharge Note  Short Stay    Procedure(s) (LRB):  Injection-steroid-epidural-cervical C7/T1 (N/A)      OUTCOME: Patient tolerated treatment/procedure well without complication and is now ready for discharge.    DISPOSITION: Home or Self Care    FINAL DIAGNOSIS:  Cervical radiculopathy    FOLLOWUP: In clinic    DISCHARGE INSTRUCTIONS:    Discharge Procedure Orders   Diet Adult Regular     No dressing needed     Notify your health care provider if you experience any of the following:  temperature >100.4     Activity as tolerated

## 2023-10-30 NOTE — OP NOTE
PROCEDURE DATE: 10/30/2023    Procedure: C7-T1 cervical interlaminar epidural steroid injection under utilizing fluoroscopy.    Diagnosis: Cervical Radiculopathy    POSTOP DIAGNOSIS: SAME    Physician: Juan Carlos Romeo MD    Medications injected:  Methylprednisone 80mg followed by a slow injection of 4 mL sterile, preservative-free normal saline.    Local anesthetic used: Lidocaine 1%, 4 ml.    Sedation Medications: 2mg versed    Complications:  none    Estimated blood loss: none    Technique:  A time-out was taken to identify patient and procedure prior to starting the procedure.  With the patient laying in a prone position with the neck in a mid-flexed forward position, the area was prepped and draped in the usual sterile fashion using ChloraPrep and a fenestrated drape.  The area was determined under AP fluoroscopic guidance.  Local anesthetic was given using a 25-gauge 1.5 inch needle by raising a wheal and then infiltrating ventrally.  A 3.5 inch 20-gauge Touhy needle was introduced under fluoroscopic guidance to meet the lamina of C7.  The needle was then hinged under the lamina then advanced using loss of resistance technique.  Once the tip of the needle was in the desired position, the contrast dye Omnipaque was injected to determine placement and no uptake.  The steroid was then injected slowly followed by a slow injection of 4 mL of the sterile preservative-free normal saline.  The patient tolerated the procedure well.    The patient was monitored after the procedure and was given post-procedure and discharge instructions to follow at home. The patient was discharged in a stable condition.

## 2023-10-31 VITALS
HEIGHT: 74 IN | RESPIRATION RATE: 16 BRPM | OXYGEN SATURATION: 98 % | DIASTOLIC BLOOD PRESSURE: 88 MMHG | HEART RATE: 68 BPM | BODY MASS INDEX: 30.42 KG/M2 | WEIGHT: 237 LBS | TEMPERATURE: 98 F | SYSTOLIC BLOOD PRESSURE: 156 MMHG

## 2023-11-27 ENCOUNTER — OFFICE VISIT (OUTPATIENT)
Dept: PAIN MEDICINE | Facility: CLINIC | Age: 53
End: 2023-11-27
Payer: OTHER GOVERNMENT

## 2023-11-27 VITALS
HEIGHT: 73 IN | BODY MASS INDEX: 31.7 KG/M2 | HEART RATE: 77 BPM | SYSTOLIC BLOOD PRESSURE: 129 MMHG | WEIGHT: 239.19 LBS | DIASTOLIC BLOOD PRESSURE: 81 MMHG

## 2023-11-27 DIAGNOSIS — M12.811 ROTATOR CUFF ARTHROPATHY OF RIGHT SHOULDER: Primary | ICD-10-CM

## 2023-11-27 DIAGNOSIS — M50.30 DDD (DEGENERATIVE DISC DISEASE), CERVICAL: ICD-10-CM

## 2023-11-27 PROCEDURE — 99999 PR PBB SHADOW E&M-EST. PATIENT-LVL III: ICD-10-PCS | Mod: PBBFAC,,, | Performed by: ANESTHESIOLOGY

## 2023-11-27 PROCEDURE — 99213 OFFICE O/P EST LOW 20 MIN: CPT | Mod: PBBFAC,PO | Performed by: ANESTHESIOLOGY

## 2023-11-27 PROCEDURE — 99214 PR OFFICE/OUTPT VISIT, EST, LEVL IV, 30-39 MIN: ICD-10-PCS | Mod: S$PBB,,, | Performed by: ANESTHESIOLOGY

## 2023-11-27 PROCEDURE — 99999 PR PBB SHADOW E&M-EST. PATIENT-LVL III: CPT | Mod: PBBFAC,,, | Performed by: ANESTHESIOLOGY

## 2023-11-27 PROCEDURE — 99214 OFFICE O/P EST MOD 30 MIN: CPT | Mod: S$PBB,,, | Performed by: ANESTHESIOLOGY

## 2023-11-27 NOTE — PROGRESS NOTES
Pain management Follow-Up Visit     CC:  Neck pain, low back pain    HPI:  The patient is a 53-year-old male with past medical history significant for C5/6 cervical spine surgery in 2008 who presents in referral from Bhavna Wild PA-C for left upper back pain.  He is status post cervical VONNIE on 10/30/2023 with 50% relief on going.  He still has some pain in his right greater than left shoulder blades and pain in the axial neck.  However he has significant pain in his right shoulder now which is somewhat different from what he had so it is difficult for him to tell the relief.  He states that several mornings ago he woke up with severe right shoulder pain, difficulty raising his right arm.  He has difficulty raising out in front of him or behind him.  He denies any pain radiating further down the arm or into the hand, no weakness.          Prior HPI:  The patient is a 49-year-old male with past medical history significant for C5/6 cervical spine surgery in 2008 who presents in referral from Bhavna Wild PA-C for left upper back pain.  He is status post L5/S1 interlaminar epidural steroid injection on 01/27/2020 with 100% relief of his leg pain.  However, the patient continues to have back pain.  He describes pain across the bilateral low back that is aching, worse with movement and also with lying down too long.  He does have improvement with Flexeril and lidocaine patches.  He also complains of bilateral neck pain radiating to the trapezius muscles but currently his back is much worse than his neck.  He denies weakness, numbness, bladder or bowel incontinence.    Previous history:  He reports having a very mild baseline degree of pain that has been present since his neck surgery in 2008 in the  but this was very tolerable until December 2018 when he developed severe pain in the left scapular region.  He does not have significant neck pain today but reports constant pain in the left scapular  region.  This is significantly worse with looking to the left and looking up.  He describes it is grabbing, tight, also worse 1st things in the morning and improved with lying down.  He has been taking Flexeril without significant relief and reports drowsiness with this.  He has some numbness and weakness in the left arm and hand in an ulnar distribution.  He denies bladder or bowel incontinence.    Pain intervention history:   In about 2010, he had undergone a lumbar injection which sounds like an epidural steroid injection and reports having almost 100% relief with this for one year.  He is status post L5/S1 VONNIE on 12/13/2019 with 70% relief of his left leg pain, 50% relief of his back pain. In January 2020, over a year ago, he had a L5/S1 LESI with improvement of his radicular lower extremity pain. left L3, 4, 5 RFA on 02/05/2021 and 02/19/2021 with significant pain relief of his axial back pain with greater than 50-60% pain improvement. Patient is s/p C7/T1 ILESI on 4/9/21 with good results with more than 50 % improvement in pain and significant improvement in function and ROM.   He is status post C7-T1 interlaminar epidural steroid injection on 04/14/2022 with 75% relief lasting greater than 1 year.  He is status post left L4/5 and L5/S1 medial branch radiofrequency ablation on 09/14/2023 and right L3/4 and L4/5 medial branch radiofrequency ablation on 09/28/2023 with Dr. Sotelo with 100% relief.     Spine surgeries: C5-6 artificial disc/flexible disc spacer in 2008.     Antineuropathics:  NSAIDs:  Naproxen  Physical therapy:  Completed physical therapy in the past, continues home exercise program  Antidepressants:  Muscle relaxers:  Flexeril 10 mg as needed, not daily  Opioids:  Antiplatelets/Anticoagulants:    ROS:  The patient reports left upper back pain.  Balance of review of systems is negative.    Past Medical History:   Diagnosis Date    Arthritis     GERD (gastroesophageal reflux disease)      Insomnia     Sleep apnea     cpap       Past Surgical History:   Procedure Laterality Date    COLONOSCOPY  2011    COLONOSCOPY N/A 07/07/2023    Procedure: COLONOSCOPY;  Surgeon: Dk Paul MD;  Location: [facility] ENDO;  Service: Endoscopy;  Laterality: N/A;    EPIDURAL STEROID INJECTION N/A 04/09/2021    Procedure: INJECTION, STEROID, EPIDURAL, C7-T1;  Surgeon: Seamus Sotelo MD;  Location: [facility] PAIN MGT;  Service: Pain Management;  Laterality: N/A;    EPIDURAL STEROID INJECTION N/A 04/14/2022    Procedure: Injection, Steroid, Epidural C7/T1 SCHEDULE 1 WEEK AFTER RFA PER JENNIFER MAHONEY;  Surgeon: Seamus Sotelo MD;  Location: [facility] PAIN MGT;  Service: Pain Management;  Laterality: N/A;    EPIDURAL STEROID INJECTION INTO CERVICAL SPINE N/A 10/30/2023    Procedure: Injection-steroid-epidural-cervical C7/T1;  Surgeon: Juan Carlos Romeo MD;  Location: [facility] OR;  Service: Pain Management;  Laterality: N/A;    EPIDURAL STEROID INJECTION INTO LUMBAR SPINE N/A 12/13/2019    Procedure: Injection-steroid-epidural-lumbar;  Surgeon: Juan Carlos Romeo MD;  Location: [facility] OR;  Service: Pain Management;  Laterality: N/A;  L5/S1 L>R    EPIDURAL STEROID INJECTION INTO LUMBAR SPINE N/A 01/27/2020    Procedure: Injection-steroid-epidural-lumbar L5/S1;  Surgeon: Juan Carlos Romeo MD;  Location: [facility] OR;  Service: Pain Management;  Laterality: N/A;    INJECTION OF ANESTHETIC AGENT AROUND MEDIAL BRANCH NERVES INNERVATING LUMBAR FACET JOINT Bilateral 09/25/2020    Procedure: Block-nerve-medial branch-lumbar L3, L4, L5;  Surgeon: Juan Carlos Romeo MD;  Location: [facility] OR;  Service: Pain Management;  Laterality: Bilateral;    INJECTION OF ANESTHETIC AGENT AROUND MEDIAL BRANCH NERVES INNERVATING LUMBAR FACET JOINT Bilateral 10/20/2020    Procedure: Block-nerve-medial branch-lumbar, L3, 4, 5;  Surgeon: Juan Carlos Romeo MD;  Location: [facility] OR;  Service: Pain Management;  Laterality: Bilateral;    JOINT REPLACEMENT  2007     C7    LARYNGOSCOPY Bilateral 11/02/2018    Procedure: Sleep endoscopy;  Surgeon: Brandin Rosen MD;  Location: [facility] OR;  Service: ENT;  Laterality: Bilateral;    MYOTOMY AND SUSPENSION OF HYOID Bilateral 09/27/2018    Procedure: Hyoid suspension;  Surgeon: Brandin Rosen MD;  Location: [facility] OR;  Service: ENT;  Laterality: Bilateral;    RADIOFREQUENCY ABLATION Right 02/05/2021    Procedure: RADIOFREQUENCY ABLATION RIGHT L3,4.5 1 of 2;  Surgeon: Seamus Sotelo MD;  Location: [facility] PAIN MGT;  Service: Pain Management;  Laterality: Right;    RADIOFREQUENCY ABLATION Left 02/19/2021    Procedure: RADIOFREQUENCY ABLATION LEFT L3,4,5 2 of 2;  Surgeon: Seamus Sotelo MD;  Location: [facility] PAIN MGT;  Service: Pain Management;  Laterality: Left;    RADIOFREQUENCY ABLATION Left 02/10/2022    Procedure: Radiofrequency Ablation LEFT L3,4,5;  Surgeon: Seamus Sotelo MD;  Location: [facility] PAIN MGT;  Service: Pain Management;  Laterality: Left;    RADIOFREQUENCY ABLATION Right 02/17/2022    Procedure: Radiofrequency Ablation RIGHT L3,4,5 SCHEDULE 1 WEEK AFTER LEFT SIDE PER JENNIFER VERGARA;  Surgeon: Seamus Sotelo MD;  Location: [facility] PAIN MGT;  Service: Pain Management;  Laterality: Right;    RADIOFREQUENCY ABLATION Left 09/14/2023    Procedure: RADIOFREQUENCY ABLATION, LEFT L3-L4 AND L5 MEDIAL BRANCH ONE OF TWO;  Surgeon: Seamus Sotelo MD;  Location: [facility] PAIN MGT;  Service: Pain Management;  Laterality: Left;    RADIOFREQUENCY ABLATION Right 09/28/2023    Procedure: RADIOFREQUENCY ABLATION, RIGHT L3-L4 AND L5 MEDIAL BRANCH TWO OF TWO;  Surgeon: Seamus Sotelo MD;  Location: [facility] PAIN MGT;  Service: Pain Management;  Laterality: Right;    SPINE SURGERY  2008    Neck fusion C6/7??    VASECTOMY  2003       Social History     Socioeconomic History    Marital status:    Occupational History    Occupation: [employer]   Tobacco Use    Smoking status: Former     Types: Vaping with nicotine    Smokeless tobacco:  "Former   Substance and Sexual Activity    Alcohol use: Yes     Alcohol/week: 12.0 standard drinks of alcohol     Types: 12 Cans of beer per week     Comment: weekly    Drug use: No    Sexual activity: Yes     Partners: Female     Birth control/protection: Partner-Vasectomy   Social History Narrative    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          Medications/Allergies: See med card    Vitals:    23 1130   BP: 129/81   Pulse: 77   Weight: 108.5 kg (239 lb 3.2 oz)   Height: 6' 1" (1.854 m)   PainSc:   3   PainLoc: Neck     Body mass index is 31.56 kg/m².      2023    11:29 AM 10/5/2023    10:48 AM 3/22/2021     9:03 AM   Last 3 PDI Scores   Pain Disability Index (PDI) 39 30 18         Physical exam: (from previous visit)  Gen: A and O x3, pleasant, well-groomed  Skin: No rashes or obvious lesions  HEENT: PERRLA, no obvious deformities on ears or in canals.Trachea midline.  CVS: Regular rate and rhythm, normal palpable pulses.  Resp: Clear to auscultation bilaterally, no wheezes or rales.  Abdomen: Soft, NT/ND.  Musculoskeletal: Able to heel walk, toe walk. No antalgic gait.     Neuro:  Upper extremities: 5/5 strength bilaterally   Reflexes: Brachioradialis 1+, Bicep 1+, Tricep 1+.   Sensory: Intact and symmetrical to light touch and pinprick in C2-T1 dermatomes bilaterally.    Cervical Spine:  Cervical spine: ROM is full in flexion, extension and lateral rotation with bilateral neck pain, worse with extension.  Spurling's maneuver causes no neck pain to either side.  Myofascial exam: No Tenderness to palpation across cervical paraspinous region bilaterally.  Right shoulder empty can test positive, pain with internal and external rotation against resistance, positive Suárez and Neer's test.            Imagin2019 CT cervical spine  Vertebral column: Redemonstrated are postsurgical changes of anterior interbody fusion of C5 and C6.  Fusion hardware does result in streak artifact.  " There is no suspected loosening or infection.  The vertebral bodies maintain normal height and alignment.  The odontoid process is intact.  There is mild disc space narrowing at the C4-5 and C6-7 levels.  Spinal canal, cord, epidural space: The spinal canal is developmentally normal.  There is no obvious abnormal epidural mass or fluid collection.  Findings by level:  C1-2: Alignment is normal.  There is no significant joint space narrowing.  C2-3: There is no spinal canal or foraminal stenosis.  C3-4: There is a minimal disc bulge. There is mild bilateral uncovertebral spurring and mild facet joint arthropathy. There is no spinal stenosis. There may be mild right foraminal stenosis.  C4-5: There is no spinal canal or significant foraminal stenosis.  C5-6: There is bilateral uncovertebral spurring. There is a mild disc osteophyte. There is artifact related to fusion hardware. There is no significant spinal stenosis. There is mild-to-moderate right and mild left foraminal stenosis suspected.  C6-7: There is left greater than right uncovertebral spurring with mild facet joint arthropathy.  There is a mild disc osteophyte complex.  There is no spinal stenosis.  There is mild bilateral foraminal stenosis.  C7-T1: There is mild facet joint arthropathy.  There is no spinal canal or significant foraminal stenosis.    11/26/19 MRI C-spine:  C2-C3: No significant central canal or neural foraminal narrowing.  C3-C4: Mild right uncovertebral spurring and bilateral facet arthropathy resulting and mild right-sided neural foraminal narrowing.  No significant spinal canal narrowing.  C4-C7 levels are obscured by beam hardening artifact.  C7-T1: No significant central canal or neural foraminal narrowing.    11/26/19 MRI L-spine:  Vertebrae: Mild concavity of the superior endplate of L1 with prominent Schmorl's node.  Hemangioma noted within the L5 vertebral body.  Degenerative endplate changes are noted at L5-S1.  Otherwise,  vertebrae demonstrate normal marrow signal without fracture or destructive process.  Discs: Mild disc space narrowing at L5-S1 with disc desiccation.  Remaining discs appearance.  Cord: Visualized cord demonstrates normal signal.  Conus terminates at L2.  Degenerative findings:  T12-L4: No significant central canal or neural foraminal narrowing.  L4-5: Broad-based disc bulge with central annular fissure and disc protrusion and bilateral facet arthropathy, resulting in mild bilateral neural foraminal narrowing.  L5-S1: Broad-based disc bulge with left paracentral annular fissure and disc protrusion and bilateral facet arthropathy resulting in mild bilateral neural foraminal narrowing.    Lab Results   Component Value Date    WBC 10.57 02/05/2023    HGB 14.6 02/05/2023    HCT 42.3 02/05/2023    MCV 92 02/05/2023     02/05/2023       CMP  Sodium   Date Value Ref Range Status   03/02/2023 141 136 - 145 mmol/L Final     Potassium   Date Value Ref Range Status   03/02/2023 4.8 3.5 - 5.1 mmol/L Final     Chloride   Date Value Ref Range Status   03/02/2023 106 95 - 110 mmol/L Final     CO2   Date Value Ref Range Status   03/02/2023 27 23 - 29 mmol/L Final     Glucose   Date Value Ref Range Status   03/02/2023 112 (H) 70 - 110 mg/dL Final     BUN   Date Value Ref Range Status   03/02/2023 21 (H) 6 - 20 mg/dL Final     Creatinine   Date Value Ref Range Status   03/02/2023 0.9 0.5 - 1.4 mg/dL Final     Calcium   Date Value Ref Range Status   03/02/2023 10.1 8.7 - 10.5 mg/dL Final     Total Protein   Date Value Ref Range Status   03/02/2023 7.6 6.0 - 8.4 g/dL Final     Albumin   Date Value Ref Range Status   03/02/2023 4.3 3.5 - 5.2 g/dL Final     Total Bilirubin   Date Value Ref Range Status   03/02/2023 0.6 0.1 - 1.0 mg/dL Final     Comment:     For infants and newborns, interpretation of results should be based  on gestational age, weight and in agreement with clinical  observations.    Premature Infant recommended  reference ranges:  Up to 24 hours.............<8.0 mg/dL  Up to 48 hours............<12.0 mg/dL  3-5 days..................<15.0 mg/dL  6-29 days.................<15.0 mg/dL       Alkaline Phosphatase   Date Value Ref Range Status   03/02/2023 58 55 - 135 U/L Final     AST   Date Value Ref Range Status   03/02/2023 27 10 - 40 U/L Final     ALT   Date Value Ref Range Status   03/02/2023 49 (H) 10 - 44 U/L Final     Anion Gap   Date Value Ref Range Status   03/02/2023 8 8 - 16 mmol/L Final     eGFR if    Date Value Ref Range Status   11/08/2019 >60.0 >60 mL/min/1.73 m^2 Final     eGFR if non    Date Value Ref Range Status   11/08/2019 >60.0 >60 mL/min/1.73 m^2 Final     Comment:     Calculation used to obtain the estimated glomerular filtration  rate (eGFR) is the CKD-EPI equation.        Lab Results   Component Value Date    HGBA1C 5.6 03/02/2023       Assessment:  The patient is a 53-year-old male with chronic neck and low back pain consistent with:    1. Rotator cuff arthropathy of right shoulder        2. DDD (degenerative disc disease), cervical            Plan:  1.  He is pleased with the results of the epidural steroid injection for his cervical spine, feels that he can tolerate this pain now.  He does have new right shoulder pain which seems to be rotator cuff arthropathy, he had no recent injuries so I do not suspect that there is any major tears but explained that he should rest, ice this over the next several days.  If it is not improving in the next week we would set him up with physical therapy and possibly have him see orthopedics.

## 2024-03-06 ENCOUNTER — OFFICE VISIT (OUTPATIENT)
Dept: OPTOMETRY | Facility: CLINIC | Age: 54
End: 2024-03-06
Payer: OTHER GOVERNMENT

## 2024-03-06 DIAGNOSIS — G43.109 OCULAR MIGRAINE: Primary | ICD-10-CM

## 2024-03-06 PROCEDURE — 99213 OFFICE O/P EST LOW 20 MIN: CPT | Mod: PBBFAC,PO

## 2024-03-06 PROCEDURE — 92004 COMPRE OPH EXAM NEW PT 1/>: CPT | Mod: S$PBB,,,

## 2024-03-06 PROCEDURE — 99999 PR PBB SHADOW E&M-EST. PATIENT-LVL III: CPT | Mod: PBBFAC,,,

## 2024-03-06 NOTE — PROGRESS NOTES
HPI    C/o flashing lights OU.    Pt sts this am [facility] had bright lights that started in center vision then   went off until the left and faded into peripheral vision. Pt sts it lasted   around 30 mins and stopped about 1 hour ago. Pt denies HA after. 1st time   experiencing this.   Last edited by Gabby Lozada on 3/6/2024 10:58 AM.            Assessment /Plan     For exam results, see Encounter Report.    Ocular migraine      All ocular health WNL OD, OS with no pathology noted. IOP normal OD, OS. Signs and symptoms consistent with ocular migraine. Ed pt on benign nature of ocular migraines. Discussed various potential triggers of ocular migraines and recommended pt maintain a log of occurrences. Ed pt that the only treatment is to uncover and manage triggers. Consider neuro referral if occurrences increasing in frequency or intensity. Otherwise, continue to monitor.       RTC: 1 year for comprehensive exam or sooner prn

## 2024-05-22 ENCOUNTER — OFFICE VISIT (OUTPATIENT)
Dept: FAMILY MEDICINE | Facility: CLINIC | Age: 54
End: 2024-05-22
Payer: OTHER GOVERNMENT

## 2024-05-22 ENCOUNTER — LAB VISIT (OUTPATIENT)
Dept: LAB | Facility: HOSPITAL | Age: 54
End: 2024-05-22
Attending: STUDENT IN AN ORGANIZED HEALTH CARE EDUCATION/TRAINING PROGRAM
Payer: OTHER GOVERNMENT

## 2024-05-22 VITALS
WEIGHT: 236.13 LBS | HEART RATE: 93 BPM | DIASTOLIC BLOOD PRESSURE: 92 MMHG | HEIGHT: 73 IN | BODY MASS INDEX: 31.3 KG/M2 | OXYGEN SATURATION: 94 % | SYSTOLIC BLOOD PRESSURE: 126 MMHG

## 2024-05-22 DIAGNOSIS — Z00.00 HEALTHCARE MAINTENANCE: Primary | ICD-10-CM

## 2024-05-22 DIAGNOSIS — Z00.00 HEALTHCARE MAINTENANCE: ICD-10-CM

## 2024-05-22 DIAGNOSIS — M10.9 GOUT, UNSPECIFIED CAUSE, UNSPECIFIED CHRONICITY, UNSPECIFIED SITE: ICD-10-CM

## 2024-05-22 DIAGNOSIS — G47.33 OSA (OBSTRUCTIVE SLEEP APNEA): ICD-10-CM

## 2024-05-22 DIAGNOSIS — R03.0 ELEVATED BLOOD PRESSURE READING IN OFFICE WITHOUT DIAGNOSIS OF HYPERTENSION: ICD-10-CM

## 2024-05-22 LAB
ALBUMIN SERPL BCP-MCNC: 4.1 G/DL (ref 3.5–5.2)
ALP SERPL-CCNC: 67 U/L (ref 55–135)
ALT SERPL W/O P-5'-P-CCNC: 50 U/L (ref 10–44)
ANION GAP SERPL CALC-SCNC: 12 MMOL/L (ref 8–16)
AST SERPL-CCNC: 20 U/L (ref 10–40)
BILIRUB SERPL-MCNC: 0.6 MG/DL (ref 0.1–1)
BUN SERPL-MCNC: 18 MG/DL (ref 6–20)
CALCIUM SERPL-MCNC: 10.2 MG/DL (ref 8.7–10.5)
CHLORIDE SERPL-SCNC: 107 MMOL/L (ref 95–110)
CHOLEST SERPL-MCNC: 156 MG/DL (ref 120–199)
CHOLEST/HDLC SERPL: 3.2 {RATIO} (ref 2–5)
CO2 SERPL-SCNC: 23 MMOL/L (ref 23–29)
CREAT SERPL-MCNC: 0.9 MG/DL (ref 0.5–1.4)
EST. GFR  (NO RACE VARIABLE): >60 ML/MIN/1.73 M^2
GLUCOSE SERPL-MCNC: 114 MG/DL (ref 70–110)
HDLC SERPL-MCNC: 49 MG/DL (ref 40–75)
HDLC SERPL: 31.4 % (ref 20–50)
LDLC SERPL CALC-MCNC: 85 MG/DL (ref 63–159)
NONHDLC SERPL-MCNC: 107 MG/DL
POTASSIUM SERPL-SCNC: 4.6 MMOL/L (ref 3.5–5.1)
PROT SERPL-MCNC: 7.5 G/DL (ref 6–8.4)
SODIUM SERPL-SCNC: 142 MMOL/L (ref 136–145)
TRIGL SERPL-MCNC: 110 MG/DL (ref 30–150)

## 2024-05-22 PROCEDURE — 99213 OFFICE O/P EST LOW 20 MIN: CPT | Mod: PBBFAC,PO | Performed by: STUDENT IN AN ORGANIZED HEALTH CARE EDUCATION/TRAINING PROGRAM

## 2024-05-22 PROCEDURE — 80061 LIPID PANEL: CPT | Performed by: STUDENT IN AN ORGANIZED HEALTH CARE EDUCATION/TRAINING PROGRAM

## 2024-05-22 PROCEDURE — 80053 COMPREHEN METABOLIC PANEL: CPT | Mod: PO | Performed by: STUDENT IN AN ORGANIZED HEALTH CARE EDUCATION/TRAINING PROGRAM

## 2024-05-22 PROCEDURE — 36415 COLL VENOUS BLD VENIPUNCTURE: CPT | Mod: PO | Performed by: STUDENT IN AN ORGANIZED HEALTH CARE EDUCATION/TRAINING PROGRAM

## 2024-05-22 PROCEDURE — 99396 PREV VISIT EST AGE 40-64: CPT | Mod: S$PBB,,, | Performed by: STUDENT IN AN ORGANIZED HEALTH CARE EDUCATION/TRAINING PROGRAM

## 2024-05-22 PROCEDURE — 99999 PR PBB SHADOW E&M-EST. PATIENT-LVL III: CPT | Mod: PBBFAC,,, | Performed by: STUDENT IN AN ORGANIZED HEALTH CARE EDUCATION/TRAINING PROGRAM

## 2024-05-22 NOTE — PROGRESS NOTES
Name: Raciel White  MRN: [id number]  : 1970    Subjective   HPI  Patient presents for regular exam - he is applying for insurance and needs an updated exam.    Review of Systems   Respiratory:  Negative for shortness of breath.    Cardiovascular:  Negative for chest pain.   Gastrointestinal:  Negative for abdominal pain, nausea and vomiting.        Patient Active Problem List   Diagnosis    Rectal bleeding    ROMA (obstructive sleep apnea)    Lumbar radiculopathy    Lumbar spondylosis    Chronic pain    Cervical radiculopathy    DDD (degenerative disc disease), cervical    DDD (degenerative disc disease), lumbar    Gout    Gastroesophageal reflux disease    Class 1 obesity due to excess calories with serious comorbidity and body mass index (BMI) of 32.0 to 32.9 in adult    Elevated blood pressure reading in office without diagnosis of hypertension       Current Outpatient Medications on File Prior to Visit   Medication Sig Dispense Refill    allopurinoL (ZYLOPRIM) 100 MG tablet 100 mg.      cholecalciferol, vitamin D3, 100 mcg (4,000 unit) Tab 10 mcg.      cyclobenzaprine (FLEXERIL) 10 MG tablet Take 1 tablet (10 mg total) by mouth daily as needed for Muscle spasms. 90 tablet 3    lidocaine (LIDODERM) 5 % Place 1 patch onto the skin once daily. Remove & Discard patch within 12 hours or as directed by MD 30 patch 3    loratadine (CLARITIN) 10 mg tablet Take 10 mg by mouth once daily.      multivitamin capsule Take 1 capsule by mouth once daily.      [DISCONTINUED] NEXIUM 20 mg capsule TAKE 1 CAPSULE DAILY (Patient not taking: Reported on 2024) 90 capsule 3    [DISCONTINUED] triamcinolone acetonide 0.025% (KENALOG) 0.025 % cream Apply 1 application  topically 2 (two) times daily as needed (rash). (Patient not taking: Reported on 2024) 15 g 11     Current Facility-Administered Medications on File Prior to Visit   Medication Dose Route Frequency Provider Last Rate Last Admin    0.9%  NaCl infusion  500  mL Intravenous Continuous Nagi Chambers MD        lactated ringers infusion   Intravenous Continuous Jaime Valdez MD   Stopped at 11/02/18 0906       Past Medical History:   Diagnosis Date    Arthritis     GERD (gastroesophageal reflux disease)     Insomnia     Sleep apnea     cpap       Past Surgical History:   Procedure Laterality Date    COLONOSCOPY  2011    COLONOSCOPY N/A 07/07/2023    Procedure: COLONOSCOPY;  Surgeon: Dk Paul MD;  Location: [facility] ENDO;  Service: Endoscopy;  Laterality: N/A;    EPIDURAL STEROID INJECTION N/A 04/09/2021    Procedure: INJECTION, STEROID, EPIDURAL, C7-T1;  Surgeon: Seamus Sotelo MD;  Location: [facility] PAIN [facility]GT;  Service: Pain Management;  Laterality: N/A;    EPIDURAL STEROID INJECTION N/A 04/14/2022    Procedure: Injection, Steroid, Epidural C7/T1 SCHEDULE 1 WEEK AFTER RFA PER JENNIFER MAHONEY;  Surgeon: Seamus Sotelo MD;  Location: [facility];  Service: Pain Management;  Laterality: N/A;    EPIDURAL STEROID INJECTION INTO CERVICAL SPINE N/A 10/30/2023    Procedure: Injection-steroid-epidural-cervical C7/T1;  Surgeon: Juan Carlos Romeo MD;  Location: [facility] OR;  Service: Pain Management;  Laterality: N/A;    EPIDURAL STEROID INJECTION INTO LUMBAR SPINE N/A 12/13/2019    Procedure: Injection-steroid-epidural-lumbar;  Surgeon: Juan Carlos Romeo MD;  Location: [facility] OR;  Service: Pain Management;  Laterality: N/A;  L5/S1 L>R    EPIDURAL STEROID INJECTION INTO LUMBAR SPINE N/A 01/27/2020    Procedure: Injection-steroid-epidural-lumbar L5/S1;  Surgeon: Juan Carlos Romeo MD;  Location: [facility] OR;  Service: Pain Management;  Laterality: N/A;    INJECTION OF ANESTHETIC AGENT AROUND MEDIAL BRANCH NERVES INNERVATING LUMBAR FACET JOINT Bilateral 09/25/2020    Procedure: Block-nerve-medial branch-lumbar L3, L4, L5;  Surgeon: Juan Carlos Romeo MD;  Location: [facility] OR;  Service: Pain Management;  Laterality: Bilateral;    INJECTION OF ANESTHETIC AGENT AROUND  MEDIAL BRANCH NERVES INNERVATING LUMBAR FACET JOINT Bilateral 10/20/2020    Procedure: Block-nerve-medial branch-lumbar, L3, 4, 5;  Surgeon: Juan Carlos Romeo MD;  Location: [facility] OR;  Service: Pain Management;  Laterality: Bilateral;    JOINT REPLACEMENT  2007    C7    LARYNGOSCOPY Bilateral 11/02/2018    Procedure: Sleep endoscopy;  Surgeon: Brandin Rosen MD;  Location: [facility] OR;  Service: ENT;  Laterality: Bilateral;    MYOTOMY AND SUSPENSION OF HYOID Bilateral 09/27/2018    Procedure: Hyoid suspension;  Surgeon: Brandin Rosen MD;  Location: [facility] OR;  Service: ENT;  Laterality: Bilateral;    RADIOFREQUENCY ABLATION Right 02/05/2021    Procedure: RADIOFREQUENCY ABLATION RIGHT L3,4.5 1 of 2;  Surgeon: Seamus Sotelo MD;  Location: [facility] PAIN [facility]GT;  Service: Pain Management;  Laterality: Right;    RADIOFREQUENCY ABLATION Left 02/19/2021    Procedure: RADIOFREQUENCY ABLATION LEFT L3,4,5 2 of 2;  Surgeon: Seamus Sotelo MD;  Location: [facility]IN [facility]GT;  Service: Pain Management;  Laterality: Left;    RADIOFREQUENCY ABLATION Left 02/10/2022    Procedure: Radiofrequency Ablation LEFT L3,4,5;  Surgeon: Seamus Sotelo MD;  Location: [facility]IN [facility]GT;  Service: Pain Management;  Laterality: Left;    RADIOFREQUENCY ABLATION Right 02/17/2022    Procedure: Radiofrequency Ablation RIGHT L3,4,5 SCHEDULE 1 WEEK AFTER LEFT SIDE PER JENNIFRE VERGARA;  Surgeon: Seamus Sotelo MD;  Location: [facility]GT;  Service: Pain Management;  Laterality: Right;    RADIOFREQUENCY ABLATION Left 09/14/2023    Procedure: RADIOFREQUENCY ABLATION, LEFT L3-L4 AND L5 MEDIAL BRANCH ONE OF TWO;  Surgeon: Seamus Sotelo MD;  Location: [facility]GT;  Service: Pain Management;  Laterality: Left;    RADIOFREQUENCY ABLATION Right 09/28/2023    Procedure: RADIOFREQUENCY ABLATION, RIGHT L3-L4 AND L5 MEDIAL BRANCH TWO OF TWO;  Surgeon: Seamus Sotelo MD;  Location: [facility]IN [facility]GT;  Service: Pain Management;  Laterality: Right;    SPINE SURGERY   2008    Neck fusion C6/7??    VASECTOMY  2003        Family History   Problem Relation Name Age of Onset    Hypertension Paternal Grandfather Kilo     Diabetes Neg Hx      Cancer Neg Hx      Glaucoma Neg Hx      Macular degeneration Neg Hx      Retinal detachment Neg Hx         Social History     Socioeconomic History    Marital status:    Occupational History    Occupation: [employer]   Tobacco Use    Smoking status: Former     Types: Vaping with nicotine    Smokeless tobacco: Former   Substance and Sexual Activity    Alcohol use: Yes     Alcohol/week: 12.0 standard drinks of alcohol     Types: 12 Cans of beer per week     Comment: weekly    Drug use: No    Sexual activity: Yes     Partners: Female     Birth control/protection: Partner-Vasectomy   Social History Narrative    Currently . 2 children. Work in [employer].        Review of patient's allergies indicates:  No Known Allergies     Health Maintenance Due   Topic Date Due    Hepatitis C Screening  Never done    HIV Screening  Never done    Shingles Vaccine (1 of 2) Never done    COVID-19 Vaccine (4 - 2023-24 season) 09/01/2023       Objective   Vitals:    05/22/24 0830   BP: (!) 126/92   Pulse: 93        Physical Exam  Constitutional:       General: He is not in acute distress.     Appearance: Normal appearance. He is well-developed.   HENT:      Head: Normocephalic and atraumatic.      Right Ear: External ear normal.      Left Ear: External ear normal.   Eyes:      Conjunctiva/sclera: Conjunctivae normal.   Cardiovascular:      Rate and Rhythm: Normal rate and regular rhythm.      Pulses:           Radial pulses are 2+ on the right side.      Heart sounds: No murmur heard.     No friction rub. No gallop.   Pulmonary:      Effort: Pulmonary effort is normal. No respiratory distress.      Breath sounds: No wheezing, rhonchi or rales.   Abdominal:      General: Abdomen is flat. There is no distension.      Tenderness: There is no abdominal  tenderness.   Musculoskeletal:         General: No swelling or deformity.      Right lower leg: No edema.      Left lower leg: No edema.   Skin:     General: Skin is warm and dry.      Coloration: Skin is not jaundiced.   Neurological:      Mental Status: He is alert and oriented to person, place, and time. Mental status is at baseline.      Deep Tendon Reflexes:      Reflex Scores:       Patellar reflexes are 2+ on the right side and 2+ on the left side.  Psychiatric:         Attention and Perception: Attention and perception normal.         Mood and Affect: Mood normal.         Speech: Speech normal.         Behavior: Behavior normal. Behavior is cooperative.         Thought Content: Thought content normal.         Cognition and Memory: Cognition normal.         Judgment: Judgment normal.          Assessment & Plan   1. Healthcare maintenance  -     Comprehensive Metabolic Panel; Future; Expected date: 05/22/2024  -     Lipid Panel; Future; Expected date: 05/22/2024    2. Elevated blood pressure reading in office without diagnosis of hypertension  Assessment & Plan:  Prior blood pressure readings have been normal. Patient mentions he had caffeine prior to coming to this visit, so that may explain the diastolic hypertension. We will have him come in for a nurse visit next week for blood pressure check - told to hold caffeine prior to check. We will use nurse visit blood pressure for form as this would more accurately reflect his true blood pressure.      3. ROMA (obstructive sleep apnea)  Assessment & Plan:  States compliance with CPAP machine and feels refreshed when he sleeps. Continue CPAP.      4. Gout, unspecified cause, unspecified chronicity, unspecified site  Assessment & Plan:  No gout flares since last visit. Continue allopurinol.          Follow up in 1 year.     Justice Waite MD  05/22/2024

## 2024-05-22 NOTE — ASSESSMENT & PLAN NOTE
Prior blood pressure readings have been normal. Patient mentions he had caffeine prior to coming to this visit, so that may explain the diastolic hypertension. We will have him come in for a nurse visit next week for blood pressure check - told to hold caffeine prior to check. We will use nurse visit blood pressure for form as this would more accurately reflect his true blood pressure.

## 2024-05-23 DIAGNOSIS — R73.9 HYPERGLYCEMIA: Primary | ICD-10-CM

## 2024-06-06 ENCOUNTER — PATIENT MESSAGE (OUTPATIENT)
Dept: FAMILY MEDICINE | Facility: CLINIC | Age: 54
End: 2024-06-06

## 2024-06-06 ENCOUNTER — CLINICAL SUPPORT (OUTPATIENT)
Dept: FAMILY MEDICINE | Facility: CLINIC | Age: 54
End: 2024-06-06
Payer: OTHER GOVERNMENT

## 2024-06-06 VITALS — SYSTOLIC BLOOD PRESSURE: 120 MMHG | DIASTOLIC BLOOD PRESSURE: 98 MMHG | HEART RATE: 71 BPM

## 2024-06-06 DIAGNOSIS — Z01.30 BLOOD PRESSURE CHECK: ICD-10-CM

## 2024-06-06 DIAGNOSIS — D22.9 CHANGE IN SKIN MOLE: Primary | ICD-10-CM

## 2024-06-06 PROCEDURE — 99499 UNLISTED E&M SERVICE: CPT | Mod: S$PBB,,, | Performed by: STUDENT IN AN ORGANIZED HEALTH CARE EDUCATION/TRAINING PROGRAM

## 2024-06-06 PROCEDURE — 99999 PR PBB SHADOW E&M-EST. PATIENT-LVL II: CPT | Mod: PBBFAC,,,

## 2024-06-06 PROCEDURE — 99212 OFFICE O/P EST SF 10 MIN: CPT | Mod: PBBFAC,PO

## 2024-06-06 NOTE — PROGRESS NOTES
Raciel White 53 y.o. male is here today for Blood Pressure check.   History of HTN no.    Review of patient's allergies indicates:  No Known Allergies  Creatinine   Date Value Ref Range Status   05/22/2024 0.9 0.5 - 1.4 mg/dL Final     Sodium   Date Value Ref Range Status   05/22/2024 142 136 - 145 mmol/L Final     Potassium   Date Value Ref Range Status   05/22/2024 4.6 3.5 - 5.1 mmol/L Final   ]      Current Outpatient Medications:     allopurinoL (ZYLOPRIM) 100 MG tablet, 100 mg., Disp: , Rfl:     cholecalciferol, vitamin D3, 100 mcg (4,000 unit) Tab, 10 mcg., Disp: , Rfl:     cyclobenzaprine (FLEXERIL) 10 MG tablet, Take 1 tablet (10 mg total) by mouth daily as needed for Muscle spasms., Disp: 90 tablet, Rfl: 3    lidocaine (LIDODERM) 5 %, Place 1 patch onto the skin once daily. Remove & Discard patch within 12 hours or as directed by MD, Disp: 30 patch, Rfl: 3    loratadine (CLARITIN) 10 mg tablet, Take 10 mg by mouth once daily., Disp: , Rfl:     multivitamin capsule, Take 1 capsule by mouth once daily., Disp: , Rfl:   No current facility-administered medications for this visit.    Facility-Administered Medications Ordered in Other Visits:     0.9%  NaCl infusion, 500 mL, Intravenous, Continuous, Nagi Chambers MD    lactated ringers infusion, , Intravenous, Continuous, Jaime Valdez MD, Stopped at 11/02/18 0906  Does patient have record of home blood pressure readings no.   Pt does not take Blood Pressure Medication   Patient is asymptomatic.   Complains of N/A.    BP: (!) 152/98 , Pulse: 70 .    Blood pressure reading after 15 minutes was 120/98, Pulse 71.  Dr. Waite notified.

## 2024-06-07 ENCOUNTER — TELEPHONE (OUTPATIENT)
Dept: FAMILY MEDICINE | Facility: CLINIC | Age: 54
End: 2024-06-07
Payer: OTHER GOVERNMENT

## 2024-06-07 NOTE — TELEPHONE ENCOUNTER
----- Message from Ana Guardado sent at 6/7/2024 12:10 PM CDT -----  Regarding: referral from   Contact: pt  Type:  Needs Medical Advice    Who Called: pt    Symptoms (please be specific): mole check     Best Call Back Number: [phone]    Additional Information: pt requesting a tri care referral be sent to [facility]  Please advise.  Thank you.

## 2024-07-11 ENCOUNTER — TELEPHONE (OUTPATIENT)
Dept: FAMILY MEDICINE | Facility: CLINIC | Age: 54
End: 2024-07-11

## 2024-07-11 NOTE — TELEPHONE ENCOUNTER
----- Message from Maryjane Howell sent at 7/11/2024 10:10 AM CDT -----  Regarding: Referral Status  Type:  Needs Medical Advice    Who Called: Pt      Would the patient rather a call back or a response via [facility]? Call Back    Best Call Back Number: [phone]      Additional Information: Sts he has not heard back about the referral he has in about a month and would like to speak to someone to see the status of it.        Have a great day. Thank you!

## 2024-07-15 ENCOUNTER — TELEPHONE (OUTPATIENT)
Dept: FAMILY MEDICINE | Facility: CLINIC | Age: 54
End: 2024-07-15
Payer: OTHER GOVERNMENT

## 2024-07-15 NOTE — TELEPHONE ENCOUNTER
Spoke with pt, informed him insurance form will be remailed out today and  referral has been approved

## 2024-07-15 NOTE — TELEPHONE ENCOUNTER
The referral to Dr. Abreu has been approved and authorized through [employer]. The Auth/order # is [id number]. Please give the patient a call to let them know.

## 2024-07-15 NOTE — TELEPHONE ENCOUNTER
----- Message from Alena Joseph sent at 7/15/2024  9:10 AM CDT -----  Contact: Patient  Type:  Needs Medical Advice    Who Called: Patient     Would the patient rather a call back or a response via [facility]? Call    Best Call Back Number: [phone] (home)     Additional Information: Patient states that he continues to get the same result. Patient is asking for a derm referral to someone that is in network for him. Patient states that he keeps getting the same referral.  Patient states that he also keeps requesting a call back but has not received one yet. Please call to advise         Prev Msg Sent       DescriptionShow Description column actions         [id number] 7/11/2024, 10:11 AM Patient Advice (message) Closed Raciel White calling regarding Patient Advice (message) for Type: Needs Medical Advice Who Called: Pt Would the patient rather a call back or a response via [facility]? Call Back Best Call Back Number: [phone] Additional Information: Sts he has not heard back about the referral he has in about a month and would like to speak to someone to see the status of it. Have a great day. Thank you!

## 2024-08-09 ENCOUNTER — PATIENT MESSAGE (OUTPATIENT)
Dept: FAMILY MEDICINE | Facility: CLINIC | Age: 54
End: 2024-08-09
Payer: OTHER GOVERNMENT

## 2024-08-27 ENCOUNTER — OFFICE VISIT (OUTPATIENT)
Dept: FAMILY MEDICINE | Facility: CLINIC | Age: 54
End: 2024-08-27
Payer: OTHER GOVERNMENT

## 2024-08-27 ENCOUNTER — PATIENT MESSAGE (OUTPATIENT)
Dept: FAMILY MEDICINE | Facility: CLINIC | Age: 54
End: 2024-08-27

## 2024-08-27 VITALS
DIASTOLIC BLOOD PRESSURE: 80 MMHG | BODY MASS INDEX: 31.78 KG/M2 | SYSTOLIC BLOOD PRESSURE: 132 MMHG | WEIGHT: 234.38 LBS | OXYGEN SATURATION: 98 % | HEART RATE: 65 BPM

## 2024-08-27 DIAGNOSIS — M79.675 CHRONIC TOE PAIN, LEFT FOOT: Primary | ICD-10-CM

## 2024-08-27 DIAGNOSIS — G89.29 CHRONIC TOE PAIN, LEFT FOOT: Primary | ICD-10-CM

## 2024-08-27 PROCEDURE — 99999 PR PBB SHADOW E&M-EST. PATIENT-LVL III: CPT | Mod: PBBFAC,,, | Performed by: STUDENT IN AN ORGANIZED HEALTH CARE EDUCATION/TRAINING PROGRAM

## 2024-08-27 PROCEDURE — 99213 OFFICE O/P EST LOW 20 MIN: CPT | Mod: PBBFAC,PO | Performed by: STUDENT IN AN ORGANIZED HEALTH CARE EDUCATION/TRAINING PROGRAM

## 2024-08-27 PROCEDURE — 99213 OFFICE O/P EST LOW 20 MIN: CPT | Mod: S$PBB,,, | Performed by: STUDENT IN AN ORGANIZED HEALTH CARE EDUCATION/TRAINING PROGRAM

## 2024-08-27 NOTE — PROGRESS NOTES
Name: Raciel White  MRN: [id number]  : 1970  PCP: Justice Waite MD    Subjective   Patient presents about left toe pain for the past six months. Describes the feeling as if there was something in his left toe. If he hits that point the right way, it can cause significant, sharp pain. He changed shoes about 3 months ago with no improvement.     Review of Systems   Musculoskeletal:  Positive for arthralgias (left toe for the past 6 months). Negative for joint swelling.   Skin:  Negative for color change and rash.   Neurological:  Positive for numbness (in right toe).       Patient Active Problem List   Diagnosis    Rectal bleeding    ROMA (obstructive sleep apnea)    Lumbar radiculopathy    Lumbar spondylosis    Chronic pain    Cervical radiculopathy    DDD (degenerative disc disease), cervical    DDD (degenerative disc disease), lumbar    Gout    Gastroesophageal reflux disease    Class 1 obesity due to excess calories with serious comorbidity and body mass index (BMI) of 32.0 to 32.9 in adult    Elevated blood pressure reading in office without diagnosis of hypertension       Health Maintenance Due   Topic Date Due    Hepatitis C Screening  Never done    Pneumococcal Vaccines (Age 0-64) (1 of 2 - PCV) Never done    HIV Screening  Never done    Shingles Vaccine (1 of 2) Never done    COVID-19 Vaccine ( season) 2023       Objective   Vitals:    24 1004   BP: 132/80   Pulse: 65       Physical Exam  Musculoskeletal:      Left ankle: Normal pulse.      Left foot: Normal range of motion. No swelling, deformity or tenderness.   Feet:      Left foot:      Skin integrity: No ulcer, blister, skin breakdown, erythema, warmth, callus, dry skin or fissure.      Toenail Condition: Left toenails are normal.         Assessment & Plan   1. Chronic toe pain, left foot  -     Ambulatory referral/consult to Podiatry; Future; Expected date: 2024    Unclear etiology. Describes a feeling as if  something abnormal is inside his toe, but the location does not make me think of [place] neuroma. Unlikely to be fracture given the patient can bear weight and there is no deformity. I advised over-the-counter pain relief. Given chronicity, we'll refer to podiatry.     Follow up as previously scheduled.     Justice Waite MD  08/27/2024

## 2024-09-11 ENCOUNTER — PATIENT MESSAGE (OUTPATIENT)
Dept: FAMILY MEDICINE | Facility: CLINIC | Age: 54
End: 2024-09-11
Payer: OTHER GOVERNMENT

## 2024-09-19 ENCOUNTER — PATIENT MESSAGE (OUTPATIENT)
Dept: FAMILY MEDICINE | Facility: CLINIC | Age: 54
End: 2024-09-19
Payer: OTHER GOVERNMENT

## 2024-10-03 ENCOUNTER — OFFICE VISIT (OUTPATIENT)
Dept: PODIATRY | Facility: CLINIC | Age: 54
End: 2024-10-03
Payer: OTHER GOVERNMENT

## 2024-10-03 VITALS — WEIGHT: 234.38 LBS | HEIGHT: 72 IN | BODY MASS INDEX: 31.74 KG/M2

## 2024-10-03 DIAGNOSIS — R20.0 NUMBNESS OF TOES: ICD-10-CM

## 2024-10-03 DIAGNOSIS — R20.2 PARESTHESIA OF FOOT, BILATERAL: Primary | ICD-10-CM

## 2024-10-03 PROCEDURE — 99203 OFFICE O/P NEW LOW 30 MIN: CPT | Mod: S$PBB,,, | Performed by: PODIATRIST

## 2024-10-03 PROCEDURE — 99999 PR PBB SHADOW E&M-EST. PATIENT-LVL II: CPT | Mod: PBBFAC,,, | Performed by: PODIATRIST

## 2024-10-03 PROCEDURE — 99212 OFFICE O/P EST SF 10 MIN: CPT | Mod: PBBFAC,PO | Performed by: PODIATRIST

## 2024-10-03 NOTE — PROGRESS NOTES
Subjective:     Patient ID: Raciel White is a 53 y.o. male.    Chief Complaint: Toe Pain (Numbness in toes stays with some occasional itching.)    Raciel is a 53 y.o. male with a past medical history of Arthritis, GERD (gastroesophageal reflux disease), Insomnia, and Sleep apnea. The patient's chief complaint consists of mostly uniform numbness in the distal portion of the toes bilateral.  States this has been present for years.  Notes having lower back issues, specifically for which he received epidural injections.  Denies pain symptoms associated with this issue.  He is noticing pruritus of the feet while at rest, however, denies changes in skin texture or coloration.  Denies having limitations in mobility nor has he sustained any recent falls.  Denies a past medical history of neuropathy.  Denies any additional pedal complaints.      Past Medical History:   Diagnosis Date    Arthritis     GERD (gastroesophageal reflux disease)     Insomnia     Sleep apnea     cpap       Past Surgical History:   Procedure Laterality Date    COLONOSCOPY  2011    COLONOSCOPY N/A 07/07/2023    Procedure: COLONOSCOPY;  Surgeon: Dk Paul MD;  Location: [facility] ENDO;  Service: Endoscopy;  Laterality: N/A;    EPIDURAL STEROID INJECTION N/A 04/09/2021    Procedure: INJECTION, STEROID, EPIDURAL, C7-T1;  Surgeon: Seamus Sotelo MD;  Location: [facility] PAIN MGT;  Service: Pain Management;  Laterality: N/A;    EPIDURAL STEROID INJECTION N/A 04/14/2022    Procedure: Injection, Steroid, Epidural C7/T1 SCHEDULE 1 WEEK AFTER RFA PER JENNIFER MAHONEY;  Surgeon: Seamus Sotelo MD;  Location: [facility] PAIN MGT;  Service: Pain Management;  Laterality: N/A;    EPIDURAL STEROID INJECTION INTO CERVICAL SPINE N/A 10/30/2023    Procedure: Injection-steroid-epidural-cervical C7/T1;  Surgeon: Juan Carlos Romeo MD;  Location: [facility] OR;  Service: Pain Management;  Laterality: N/A;    EPIDURAL STEROID INJECTION INTO LUMBAR SPINE N/A 12/13/2019     Procedure: Injection-steroid-epidural-lumbar;  Surgeon: Juan Carlos Romeo MD;  Location: [facility] OR;  Service: Pain Management;  Laterality: N/A;  L5/S1 L>R    EPIDURAL STEROID INJECTION INTO LUMBAR SPINE N/A 01/27/2020    Procedure: Injection-steroid-epidural-lumbar L5/S1;  Surgeon: Juan Carlos Romeo MD;  Location: [facility] OR;  Service: Pain Management;  Laterality: N/A;    INJECTION OF ANESTHETIC AGENT AROUND MEDIAL BRANCH NERVES INNERVATING LUMBAR FACET JOINT Bilateral 09/25/2020    Procedure: Block-nerve-medial branch-lumbar L3, L4, L5;  Surgeon: Juan Carlos Romeo MD;  Location: [facility];  Service: Pain Management;  Laterality: Bilateral;    INJECTION OF ANESTHETIC AGENT AROUND MEDIAL BRANCH NERVES INNERVATING LUMBAR FACET JOINT Bilateral 10/20/2020    Procedure: Block-nerve-medial branch-lumbar, L3, 4, 5;  Surgeon: Juan Carlos Romeo MD;  Location: [facility] OR;  Service: Pain Management;  Laterality: Bilateral;    JOINT REPLACEMENT  2007    C7    LARYNGOSCOPY Bilateral 11/02/2018    Procedure: Sleep endoscopy;  Surgeon: Brandin Rosen MD;  Location: [facility] OR;  Service: ENT;  Laterality: Bilateral;    MYOTOMY AND SUSPENSION OF HYOID Bilateral 09/27/2018    Procedure: Hyoid suspension;  Surgeon: Brandin Rosen MD;  Location: [facility] OR;  Service: ENT;  Laterality: Bilateral;    RADIOFREQUENCY ABLATION Right 02/05/2021    Procedure: RADIOFREQUENCY ABLATION RIGHT L3,4.5 1 of 2;  Surgeon: Seamus Sotelo MD;  Location: [facility] PAIN MGT;  Service: Pain Management;  Laterality: Right;    RADIOFREQUENCY ABLATION Left 02/19/2021    Procedure: RADIOFREQUENCY ABLATION LEFT L3,4,5 2 of 2;  Surgeon: Seamus Sotelo MD;  Location: [facility] PAIN MGT;  Service: Pain Management;  Laterality: Left;    RADIOFREQUENCY ABLATION Left 02/10/2022    Procedure: Radiofrequency Ablation LEFT L3,4,5;  Surgeon: Seamus Sotelo MD;  Location: [facility] PAIN MGT;  Service: Pain Management;  Laterality: Left;    RADIOFREQUENCY ABLATION Right 02/17/2022     Procedure: Radiofrequency Ablation RIGHT L3,4,5 SCHEDULE 1 WEEK AFTER LEFT SIDE PER JENNIFER VERGARA;  Surgeon: Seamus Sotelo MD;  Location: [facility] PAIN MGT;  Service: Pain Management;  Laterality: Right;    RADIOFREQUENCY ABLATION Left 09/14/2023    Procedure: RADIOFREQUENCY ABLATION, LEFT L3-L4 AND L5 MEDIAL BRANCH ONE OF TWO;  Surgeon: Seamus Sotelo MD;  Location: [facility] PAIN MGT;  Service: Pain Management;  Laterality: Left;    RADIOFREQUENCY ABLATION Right 09/28/2023    Procedure: RADIOFREQUENCY ABLATION, RIGHT L3-L4 AND L5 MEDIAL BRANCH TWO OF TWO;  Surgeon: Seamus Sotelo MD;  Location: [facility] PAIN MGT;  Service: Pain Management;  Laterality: Right;    SPINE SURGERY  2008    Neck fusion C6/7??    VASECTOMY  2003       Family History   Problem Relation Name Age of Onset    Hypertension Paternal Grandfather [place]     Diabetes Neg Hx      Cancer Neg Hx      Glaucoma Neg Hx      Macular degeneration Neg Hx      Retinal detachment Neg Hx         Social History     Socioeconomic History    Marital status:    Occupational History    Occupation: [employer]   Tobacco Use    Smoking status: Every Day     Types: Vaping with nicotine    Smokeless tobacco: Former   Substance and Sexual Activity    Alcohol use: Yes     Alcohol/week: 12.0 standard drinks of alcohol     Types: 12 Cans of beer per week     Comment: weekly    Drug use: No    Sexual activity: Yes     Partners: Female     Birth control/protection: Partner-Vasectomy   Social History Narrative    Currently . 2 children. Work in [employer].        Current Outpatient Medications   Medication Sig Dispense Refill    allopurinoL (ZYLOPRIM) 100 MG tablet 100 mg.      cholecalciferol, vitamin D3, 100 mcg (4,000 unit) Tab 10 mcg.      cyclobenzaprine (FLEXERIL) 10 MG tablet Take 1 tablet (10 mg total) by mouth daily as needed for Muscle spasms. 90 tablet 3    lidocaine (LIDODERM) 5 % Place 1 patch onto the skin once daily. Remove & Discard patch  within 12 hours or as directed by MD 30 patch 3    loratadine (CLARITIN) 10 mg tablet Take 10 mg by mouth once daily.      multivitamin capsule Take 1 capsule by mouth once daily.       No current facility-administered medications for this visit.     Facility-Administered Medications Ordered in Other Visits   Medication Dose Route Frequency Provider Last Rate Last Admin    0.9%  NaCl infusion  500 mL Intravenous Continuous Nagi Chambers MD        lactated ringers infusion   Intravenous Continuous José MiguelJaime whiting MD   Stopped at 11/02/18 0906       Review of patient's allergies indicates:  Not on File     Hemoglobin A1C   Date Value Ref Range Status   03/02/2023 5.6 4.0 - 5.6 % Final     Comment:     ADA Screening Guidelines:  5.7-6.4%  Consistent with prediabetes  >or=6.5%  Consistent with diabetes    High levels of fetal hemoglobin interfere with the HbA1C  assay. Heterozygous hemoglobin variants (HbS, HgC, etc)do  not significantly interfere with this assay.   However, presence of multiple variants may affect accuracy.       Past Medical History:   Diagnosis Date    Arthritis     GERD (gastroesophageal reflux disease)     Insomnia     Sleep apnea     cpap       Past Surgical History:   Procedure Laterality Date    COLONOSCOPY  2011    COLONOSCOPY N/A 07/07/2023    Procedure: COLONOSCOPY;  Surgeon: Dk Palu MD;  Location: [facility];  Service: Endoscopy;  Laterality: N/A;    EPIDURAL STEROID INJECTION N/A 04/09/2021    Procedure: INJECTION, STEROID, EPIDURAL, C7-T1;  Surgeon: Seamus Sotelo MD;  [facility];  Service: Pain Management;  Laterality: N/A;    EPIDURAL STEROID INJECTION N/A 04/14/2022    Procedure: Injection, Steroid, Epidural C7/T1 SCHEDULE 1 WEEK AFTER RFA PER JENNIFER MAHONEY;  Surgeon: Seamus Sotelo MD;  Location: [facility] PAIN T;  Service: Pain Management;  Laterality: N/A;    EPIDURAL STEROID INJECTION INTO CERVICAL SPINE N/A 10/30/2023    Procedure:  Injection-steroid-epidural-cervical C7/T1;  Surgeon: Juan Carlos Romeo MD;  Location: [facility] OR;  Service: Pain Management;  Laterality: N/A;    EPIDURAL STEROID INJECTION INTO LUMBAR SPINE N/A 12/13/2019    Procedure: Injection-steroid-epidural-lumbar;  Surgeon: Juan Carlos Romeo MD;  Location: [facility] OR;  Service: Pain Management;  Laterality: N/A;  L5/S1 L>R    EPIDURAL STEROID INJECTION INTO LUMBAR SPINE N/A 01/27/2020    Procedure: Injection-steroid-epidural-lumbar L5/S1;  Surgeon: Juan Carlos Romeo MD;  Location: [facility] OR;  Service: Pain Management;  Laterality: N/A;    INJECTION OF ANESTHETIC AGENT AROUND MEDIAL BRANCH NERVES INNERVATING LUMBAR FACET JOINT Bilateral 09/25/2020    Procedure: Block-nerve-medial branch-lumbar L3, L4, L5;  Surgeon: Juan Carlos Romeo MD;  Location: [facility] OR;  Service: Pain Management;  Laterality: Bilateral;    INJECTION OF ANESTHETIC AGENT AROUND MEDIAL BRANCH NERVES INNERVATING LUMBAR FACET JOINT Bilateral 10/20/2020    Procedure: Block-nerve-medial branch-lumbar, L3, 4, 5;  Surgeon: Juan Carlos Romeo MD;  Location: [facility] OR;  Service: Pain Management;  Laterality: Bilateral;    JOINT REPLACEMENT  2007    C7    LARYNGOSCOPY Bilateral 11/02/2018    Procedure: Sleep endoscopy;  Surgeon: Brandin Rosen MD;  Location: [facility] OR;  Service: ENT;  Laterality: Bilateral;    MYOTOMY AND SUSPENSION OF HYOID Bilateral 09/27/2018    Procedure: Hyoid suspension;  Surgeon: Brandin Rosen MD;  Location: [facility] OR;  Service: ENT;  Laterality: Bilateral;    RADIOFREQUENCY ABLATION Right 02/05/2021    Procedure: RADIOFREQUENCY ABLATION RIGHT L3,4.5 1 of 2;  Surgeon: Seamus Sotelo MD;  Location: [facility] PAIN MGT;  Service: Pain Management;  Laterality: Right;    RADIOFREQUENCY ABLATION Left 02/19/2021    Procedure: RADIOFREQUENCY ABLATION LEFT L3,4,5 2 of 2;  Surgeon: Seamus Sotelo MD;  Location: [facility] PAIN MGT;  Service: Pain Management;  Laterality: Left;    RADIOFREQUENCY ABLATION Left  02/10/2022    Procedure: Radiofrequency Ablation LEFT L3,4,5;  Surgeon: Seamus Sotelo MD;  Location: [facility] PAIN MGT;  Service: Pain Management;  Laterality: Left;    RADIOFREQUENCY ABLATION Right 02/17/2022    Procedure: Radiofrequency Ablation RIGHT L3,4,5 SCHEDULE 1 WEEK AFTER LEFT SIDE PER JENNIFER VERGARA;  Surgeon: Seamus Sotelo MD;  Location: [facility] PAIN MGT;  Service: Pain Management;  Laterality: Right;    RADIOFREQUENCY ABLATION Left 09/14/2023    Procedure: RADIOFREQUENCY ABLATION, LEFT L3-L4 AND L5 MEDIAL BRANCH ONE OF TWO;  Surgeon: Seamus Sotelo MD;  Location: [facility] PAIN MGT;  Service: Pain Management;  Laterality: Left;    RADIOFREQUENCY ABLATION Right 09/28/2023    Procedure: RADIOFREQUENCY ABLATION, RIGHT L3-L4 AND L5 MEDIAL BRANCH TWO OF TWO;  Surgeon: Seamus Sotelo MD;  Location: [facility] PAIN MGT;  Service: Pain Management;  Laterality: Right;    SPINE SURGERY  2008    Neck fusion C6/7??    VASECTOMY  2003       Family History   Problem Relation Name Age of Onset    Hypertension Paternal Grandfather [place]     Diabetes Neg Hx      Cancer Neg Hx      Glaucoma Neg Hx      Macular degeneration Neg Hx      Retinal detachment Neg Hx         Social History     Socioeconomic History    Marital status:    Occupational History    Occupation: [employer]   Tobacco Use    Smoking status: Every Day     Types: Vaping with nicotine    Smokeless tobacco: Former   Substance and Sexual Activity    Alcohol use: Yes     Alcohol/week: 12.0 standard drinks of alcohol     Types: 12 Cans of beer per week     Comment: weekly    Drug use: No    Sexual activity: Yes     Partners: Female     Birth control/protection: Partner-Vasectomy   Social History Narrative    Currently . 2 children. Work in [employer].        Current Outpatient Medications   Medication Sig Dispense Refill    allopurinoL (ZYLOPRIM) 100 MG tablet 100 mg.      cholecalciferol, vitamin D3, 100 mcg (4,000 unit) Tab 10 mcg.       cyclobenzaprine (FLEXERIL) 10 MG tablet Take 1 tablet (10 mg total) by mouth daily as needed for Muscle spasms. 90 tablet 3    lidocaine (LIDODERM) 5 % Place 1 patch onto the skin once daily. Remove & Discard patch within 12 hours or as directed by MD 30 patch 3    loratadine (CLARITIN) 10 mg tablet Take 10 mg by mouth once daily.      multivitamin capsule Take 1 capsule by mouth once daily.       No current facility-administered medications for this visit.     Facility-Administered Medications Ordered in Other Visits   Medication Dose Route Frequency Provider Last Rate Last Admin    0.9%  NaCl infusion  500 mL Intravenous Continuous Nagi Chambers MD        lactated ringers infusion   Intravenous Continuous José Miguel, Jaime SARKAR MD   Stopped at 11/02/18 0906       Review of patient's allergies indicates:  Not on File   Review of Systems   Constitutional: Negative for chills and fever.   Skin:  Negative for color change and nail changes.   Musculoskeletal:  Negative for joint swelling, muscle cramps and muscle weakness.   Gastrointestinal:  Negative for nausea and vomiting.   Neurological:  Positive for numbness and paresthesias.   Psychiatric/Behavioral:  Negative for altered mental status.         Objective:     Physical Exam  Constitutional:       Appearance: Normal appearance. He is not ill-appearing.   Cardiovascular:      Pulses:           Dorsalis pedis pulses are 2+ on the right side and 2+ on the left side.        Posterior tibial pulses are 2+ on the right side and 2+ on the left side.      Comments: CFT is < 3 seconds bilateral.  Pedal hair growth is present bilateral.  No lower extremity edema noted bilateral.  Toes are warm to touch bilateral.    Musculoskeletal:         General: No deformity or signs of injury.      Right lower leg: No edema.      Left lower leg: No edema.      Comments: Muscle strength 5/5 in all muscle groups bilateral.  No tenderness nor crepitation with ROM of foot/ankle joints  bilateral.  No tenderness with palpation of bilateral foot and ankle.      Skin:     Capillary Refill: Capillary refill takes 2 to 3 seconds.      Findings: No bruising, ecchymosis, erythema, signs of injury, laceration, lesion, petechiae, rash or wound.      Comments: Pedal skin has normal turgor, temperature, and texture bilateral.  Toenails x 10 appear normotrophic. Examination of the skin reveals no evidence of significant maceration, rashes, open lesions, suspicious appearing nevi or other concerning lesions.       Neurological:      General: No focal deficit present.      Mental Status: He is alert.      Sensory: No sensory deficit.      Motor: No weakness or atrophy.      Comments: Light touch is intact bilateral.  Protective sensation is intact bilateral.  (-) tinel sign bilateral.             Assessment:      Encounter Diagnoses   Name Primary?    Paresthesia of foot, bilateral Yes    Numbness of toes      Plan:     Raciel was seen today for toe pain.    Diagnoses and all orders for this visit:    Paresthesia of foot, bilateral  -     Ambulatory referral/consult to Podiatry    Numbness of toes      I counseled the patient on his conditions, their implications and medical management.    Based on today's exam, patient's symptoms appear consistent with current lower back pathology.    Given both verbal and written information regarding alpha lipoic acid and B-complex vitamins.  Take as instructed in clinic.     Recommend applying wise men balm to areas of paresthesias QD.    Patient to relay efficacy of the supplementation through his mychart in 6 weeks.    RTC prn.    Dk Whitten DPM

## 2025-04-11 RX ORDER — CYCLOBENZAPRINE HCL 10 MG
10 TABLET ORAL DAILY PRN
Qty: 90 TABLET | Refills: 3 | Status: SHIPPED | OUTPATIENT
Start: 2025-04-11

## 2025-04-14 ENCOUNTER — OFFICE VISIT (OUTPATIENT)
Dept: FAMILY MEDICINE | Facility: CLINIC | Age: 55
End: 2025-04-14
Payer: OTHER GOVERNMENT

## 2025-04-14 DIAGNOSIS — F99 INSOMNIA DUE TO OTHER MENTAL DISORDER: ICD-10-CM

## 2025-04-14 DIAGNOSIS — M54.16 LUMBAR RADICULOPATHY: ICD-10-CM

## 2025-04-14 DIAGNOSIS — Z00.00 HEALTHCARE MAINTENANCE: ICD-10-CM

## 2025-04-14 DIAGNOSIS — F51.05 INSOMNIA DUE TO OTHER MENTAL DISORDER: ICD-10-CM

## 2025-04-14 DIAGNOSIS — F43.10 PTSD (POST-TRAUMATIC STRESS DISORDER): Primary | ICD-10-CM

## 2025-04-14 PROCEDURE — 98005 SYNCH AUDIO-VIDEO EST LOW 20: CPT | Mod: 95,,, | Performed by: STUDENT IN AN ORGANIZED HEALTH CARE EDUCATION/TRAINING PROGRAM

## 2025-04-14 RX ORDER — ZOLPIDEM TARTRATE 5 MG/1
5 TABLET ORAL NIGHTLY PRN
Qty: 30 TABLET | Refills: 0 | Status: SHIPPED | OUTPATIENT
Start: 2025-04-14 | End: 2025-10-13

## 2025-04-14 RX ORDER — AMITRIPTYLINE HYDROCHLORIDE 10 MG/1
10 TABLET, FILM COATED ORAL NIGHTLY PRN
Qty: 90 TABLET | Refills: 3 | Status: SHIPPED | OUTPATIENT
Start: 2025-04-14 | End: 2026-04-14

## 2025-04-14 RX ORDER — DULOXETIN HYDROCHLORIDE 30 MG/1
CAPSULE, DELAYED RELEASE ORAL
Qty: 63 CAPSULE | Refills: 0 | Status: SHIPPED | OUTPATIENT
Start: 2025-04-14 | End: 2025-05-19

## 2025-04-14 NOTE — PROGRESS NOTES
The patient location is: [place]  The chief complaint leading to consultation is: PTSD    Visit type: audiovisual    Notes:    HPI  Patient presents for refills. He rarely needs to take either amitriptyline or zolpidem to treat symptoms of his PTSD but he is now due for refills.     Review of Systems   Constitutional:  Positive for activity change and unexpected weight change.   HENT:  Positive for rhinorrhea. Negative for hearing loss and trouble swallowing.    Eyes:  Negative for discharge and visual disturbance.   Respiratory:  Negative for chest tightness and wheezing.    Cardiovascular:  Positive for palpitations. Negative for chest pain.   Gastrointestinal:  Negative for blood in stool, constipation, diarrhea and vomiting.   Endocrine: Positive for polydipsia. Negative for polyuria.   Genitourinary:  Negative for difficulty urinating, hematuria and urgency.   Musculoskeletal:  Positive for arthralgias and neck pain. Negative for joint swelling.   Neurological:  Positive for headaches. Negative for weakness.   Psychiatric/Behavioral:  Positive for confusion and dysphoric mood.        Objective:  Physical Exam  Constitutional:       General: He is not in acute distress.     Appearance: Normal appearance. He is well-developed.   HENT:      Head: Normocephalic and atraumatic.   Pulmonary:      Effort: Pulmonary effort is normal. No respiratory distress.   Neurological:      Mental Status: He is alert and oriented to person, place, and time. Mental status is at baseline.   Psychiatric:         Attention and Perception: Attention and perception normal.         Mood and Affect: Mood normal.         Speech: Speech normal.         Behavior: Behavior normal. Behavior is cooperative.         Thought Content: Thought content normal.         Cognition and Memory: Cognition normal.         Judgment: Judgment normal.         1. PTSD (post-traumatic stress disorder)  Assessment & Plan:  Refills as listed. However, we  discussed alternatives. Given chronic neuropathic pains, duloxetine may be a good option. Patient understands that this is a daily medication, not an as needed medication.     Orders:  -     amitriptyline (ELAVIL) 10 MG tablet; Take 1 tablet (10 mg total) by mouth nightly as needed for Insomnia.  Dispense: 90 tablet; Refill: 3  -     DULoxetine (CYMBALTA) 30 MG capsule; Take 1 capsule (30 mg total) by mouth once daily for 7 days, THEN 2 capsules (60 mg total) once daily.  Dispense: 63 capsule; Refill: 0    2. Lumbar radiculopathy  -     amitriptyline (ELAVIL) 10 MG tablet; Take 1 tablet (10 mg total) by mouth nightly as needed for Insomnia.  Dispense: 90 tablet; Refill: 3  -     DULoxetine (CYMBALTA) 30 MG capsule; Take 1 capsule (30 mg total) by mouth once daily for 7 days, THEN 2 capsules (60 mg total) once daily.  Dispense: 63 capsule; Refill: 0    3. Insomnia due to other mental disorder  -     zolpidem (AMBIEN) 5 MG Tab; Take 1 tablet (5 mg total) by mouth nightly as needed (insomnia).  Dispense: 30 tablet; Refill: 0    4. Healthcare maintenance  -     CBC Without Differential; Future; Expected date: 04/14/2025  -     Comprehensive Metabolic Panel; Future; Expected date: 04/14/2025  -     Lipid Panel; Future; Expected date: 04/14/2025  -     Hemoglobin A1C; Future; Expected date: 04/14/2025         Follow up as previously scheduled. Labs to be collected prior to our next visit.     Face to Face time with patient: 11 minutes  14 minutes of total time spent on the encounter, which includes face to face time and non-face to face time preparing to see the patient (eg, review of tests), Obtaining and/or reviewing separately obtained history, Documenting clinical information in the electronic or other health record, Independently interpreting results (not separately reported) and communicating results to the patient/family/caregiver, or Care coordination (not separately reported).     Each patient to whom he or she  provides medical services by telemedicine is:  (1) informed of the relationship between the physician and patient and the respective role of any other health care provider with respect to management of the patient; and (2) notified that he or she may decline to receive medical services by telemedicine and may withdraw from such care at any time.

## 2025-04-14 NOTE — ASSESSMENT & PLAN NOTE
Refills as listed. However, we discussed alternatives. Given chronic neuropathic pains, duloxetine may be a good option. Patient understands that this is a daily medication, not an as needed medication.

## 2025-05-05 DIAGNOSIS — M54.16 LUMBAR RADICULOPATHY: ICD-10-CM

## 2025-05-05 DIAGNOSIS — F43.10 PTSD (POST-TRAUMATIC STRESS DISORDER): ICD-10-CM

## 2025-05-05 RX ORDER — DULOXETIN HYDROCHLORIDE 60 MG/1
60 CAPSULE, DELAYED RELEASE ORAL DAILY
Qty: 90 CAPSULE | Refills: 3 | Status: SHIPPED | OUTPATIENT
Start: 2025-05-05

## 2025-05-19 ENCOUNTER — LAB VISIT (OUTPATIENT)
Dept: LAB | Facility: HOSPITAL | Age: 55
End: 2025-05-19
Attending: STUDENT IN AN ORGANIZED HEALTH CARE EDUCATION/TRAINING PROGRAM
Payer: OTHER GOVERNMENT

## 2025-05-19 DIAGNOSIS — R73.9 HYPERGLYCEMIA: ICD-10-CM

## 2025-05-19 DIAGNOSIS — Z00.00 HEALTHCARE MAINTENANCE: ICD-10-CM

## 2025-05-19 LAB
ALBUMIN SERPL BCP-MCNC: 4.2 G/DL (ref 3.5–5.2)
ALP SERPL-CCNC: 57 UNIT/L (ref 40–150)
ALT SERPL W/O P-5'-P-CCNC: 27 UNIT/L (ref 10–44)
ANION GAP (OHS): 12 MMOL/L (ref 8–16)
AST SERPL-CCNC: 19 UNIT/L (ref 11–45)
BILIRUB SERPL-MCNC: 0.4 MG/DL (ref 0.1–1)
BUN SERPL-MCNC: 23 MG/DL (ref 6–20)
CALCIUM SERPL-MCNC: 9.3 MG/DL (ref 8.7–10.5)
CHLORIDE SERPL-SCNC: 104 MMOL/L (ref 95–110)
CHOLEST SERPL-MCNC: 196 MG/DL (ref 120–199)
CHOLEST/HDLC SERPL: 3.1 {RATIO} (ref 2–5)
CO2 SERPL-SCNC: 24 MMOL/L (ref 23–29)
CREAT SERPL-MCNC: 1 MG/DL (ref 0.5–1.4)
EAG (OHS): 111 MG/DL (ref 68–131)
ERYTHROCYTE [DISTWIDTH] IN BLOOD BY AUTOMATED COUNT: 12.9 % (ref 11.5–14.5)
GFR SERPLBLD CREATININE-BSD FMLA CKD-EPI: >60 ML/MIN/1.73/M2
GLUCOSE SERPL-MCNC: 115 MG/DL (ref 70–110)
HBA1C MFR BLD: 5.5 % (ref 4–5.6)
HCT VFR BLD AUTO: 43.2 % (ref 40–54)
HDLC SERPL-MCNC: 64 MG/DL (ref 40–75)
HDLC SERPL: 32.7 % (ref 20–50)
HGB BLD-MCNC: 14.3 GM/DL (ref 14–18)
LDLC SERPL CALC-MCNC: 52 MG/DL (ref 63–159)
MCH RBC QN AUTO: 32.1 PG (ref 27–31)
MCHC RBC AUTO-ENTMCNC: 33.1 G/DL (ref 32–36)
MCV RBC AUTO: 97 FL (ref 82–98)
NONHDLC SERPL-MCNC: 132 MG/DL
PLATELET # BLD AUTO: 288 K/UL (ref 150–450)
PMV BLD AUTO: 11.2 FL (ref 9.2–12.9)
POTASSIUM SERPL-SCNC: 3.8 MMOL/L (ref 3.5–5.1)
PROT SERPL-MCNC: 7.7 GM/DL (ref 6–8.4)
RBC # BLD AUTO: 4.46 M/UL (ref 4.6–6.2)
SODIUM SERPL-SCNC: 140 MMOL/L (ref 136–145)
TRIGL SERPL-MCNC: 400 MG/DL (ref 30–150)
WBC # BLD AUTO: 6.98 K/UL (ref 3.9–12.7)

## 2025-05-19 PROCEDURE — 80061 LIPID PANEL: CPT

## 2025-05-19 PROCEDURE — 80053 COMPREHEN METABOLIC PANEL: CPT | Mod: PO

## 2025-05-19 PROCEDURE — 85027 COMPLETE CBC AUTOMATED: CPT

## 2025-05-19 PROCEDURE — 36415 COLL VENOUS BLD VENIPUNCTURE: CPT | Mod: PO

## 2025-05-19 PROCEDURE — 83036 HEMOGLOBIN GLYCOSYLATED A1C: CPT

## 2025-05-22 ENCOUNTER — OFFICE VISIT (OUTPATIENT)
Dept: FAMILY MEDICINE | Facility: CLINIC | Age: 55
End: 2025-05-22
Payer: OTHER GOVERNMENT

## 2025-05-22 VITALS
SYSTOLIC BLOOD PRESSURE: 124 MMHG | HEART RATE: 108 BPM | OXYGEN SATURATION: 95 % | WEIGHT: 219.94 LBS | HEIGHT: 72 IN | BODY MASS INDEX: 29.79 KG/M2 | DIASTOLIC BLOOD PRESSURE: 86 MMHG

## 2025-05-22 DIAGNOSIS — M54.12 CERVICAL RADICULOPATHY: ICD-10-CM

## 2025-05-22 DIAGNOSIS — N52.9 ERECTILE DYSFUNCTION, UNSPECIFIED ERECTILE DYSFUNCTION TYPE: ICD-10-CM

## 2025-05-22 DIAGNOSIS — F43.10 PTSD (POST-TRAUMATIC STRESS DISORDER): ICD-10-CM

## 2025-05-22 DIAGNOSIS — Z00.00 HEALTHCARE MAINTENANCE: Primary | ICD-10-CM

## 2025-05-22 DIAGNOSIS — M54.16 LUMBAR RADICULOPATHY: ICD-10-CM

## 2025-05-22 DIAGNOSIS — E78.1 HYPERTRIGLYCERIDEMIA: ICD-10-CM

## 2025-05-22 PROBLEM — E66.811 CLASS 1 OBESITY DUE TO EXCESS CALORIES WITH SERIOUS COMORBIDITY AND BODY MASS INDEX (BMI) OF 32.0 TO 32.9 IN ADULT: Status: RESOLVED | Noted: 2023-10-05 | Resolved: 2025-05-22

## 2025-05-22 PROBLEM — R03.0 ELEVATED BLOOD PRESSURE READING IN OFFICE WITHOUT DIAGNOSIS OF HYPERTENSION: Status: RESOLVED | Noted: 2024-05-22 | Resolved: 2025-05-22

## 2025-05-22 PROBLEM — E66.09 CLASS 1 OBESITY DUE TO EXCESS CALORIES WITH SERIOUS COMORBIDITY AND BODY MASS INDEX (BMI) OF 32.0 TO 32.9 IN ADULT: Status: RESOLVED | Noted: 2023-10-05 | Resolved: 2025-05-22

## 2025-05-22 PROCEDURE — 99396 PREV VISIT EST AGE 40-64: CPT | Mod: S$PBB,,, | Performed by: STUDENT IN AN ORGANIZED HEALTH CARE EDUCATION/TRAINING PROGRAM

## 2025-05-22 PROCEDURE — 99214 OFFICE O/P EST MOD 30 MIN: CPT | Mod: PBBFAC,PO | Performed by: STUDENT IN AN ORGANIZED HEALTH CARE EDUCATION/TRAINING PROGRAM

## 2025-05-22 PROCEDURE — 99999 PR PBB SHADOW E&M-EST. PATIENT-LVL IV: CPT | Mod: PBBFAC,,, | Performed by: STUDENT IN AN ORGANIZED HEALTH CARE EDUCATION/TRAINING PROGRAM

## 2025-05-22 RX ORDER — SILDENAFIL 50 MG/1
50 TABLET, FILM COATED ORAL DAILY PRN
Qty: 12 TABLET | Refills: 11 | Status: SHIPPED | OUTPATIENT
Start: 2025-05-22 | End: 2026-05-22

## 2025-05-22 NOTE — PROGRESS NOTES
Name: Raciel White  MRN: [id number]  : 1970  PCP: Justice Waite MD    Subjective   HPI  Patient presents for regular checkup. He had lab work within the last week which we reviewed.     He brought paperwork - he has had significant trouble at work due to his PTSD and chronic cervical and lumbar pain preventing him from concentrating or sitting for long periods. [facility] is [facility]% disabled [facility].     Review of Systems   Respiratory:  Negative for shortness of breath.    Cardiovascular:  Negative for chest pain, palpitations and leg swelling.   Gastrointestinal:  Negative for constipation, diarrhea, nausea and vomiting.   Musculoskeletal:  Positive for back pain.   Neurological:  Positive for numbness. Negative for dizziness and light-headedness.   Psychiatric/Behavioral:  Positive for decreased concentration. The patient is nervous/anxious.         Problem List[1]    Medications Ordered Prior to Encounter[2]    Past Medical History:   Diagnosis Date    Arthritis     GERD (gastroesophageal reflux disease)     Insomnia     Sleep apnea     cpap       Past Surgical History:   Procedure Laterality Date    COLONOSCOPY      COLONOSCOPY N/A 2023    Procedure: COLONOSCOPY;  Surgeon: Dk Paul MD;  Location: [facility] ENDO;  Service: Endoscopy;  Laterality: N/A;    EPIDURAL STEROID INJECTION N/A 2021    Procedure: INJECTION, STEROID, EPIDURAL, C7-T1;  Surgeon: Seamus Sotelo MD;  Location: [facility];  Service: Pain Management;  Laterality: N/A;    EPIDURAL STEROID INJECTION N/A 2022    Procedure: Injection, Steroid, Epidural C7/T1 SCHEDULE 1 WEEK AFTER RFA PER JENNIFER MAHONEY;  Surgeon: Seamus Sotelo MD;  Location: [facility] PAIN T;  Service: Pain Management;  Laterality: N/A;    EPIDURAL STEROID INJECTION INTO CERVICAL SPINE N/A 10/30/2023    Procedure: Injection-steroid-epidural-cervical C7/T1;  Surgeon: Juan Carlos Romeo MD;  Location: [facility] OR;  Service: Pain Management;   Laterality: N/A;    EPIDURAL STEROID INJECTION INTO LUMBAR SPINE N/A 12/13/2019    Procedure: Injection-steroid-epidural-lumbar;  Surgeon: Juan Carlos Romeo MD;  Location: [facility] OR;  Service: Pain Management;  Laterality: N/A;  L5/S1 L>R    EPIDURAL STEROID INJECTION INTO LUMBAR SPINE N/A 01/27/2020    Procedure: Injection-steroid-epidural-lumbar L5/S1;  Surgeon: Juan Carlos Romeo MD;  Location: [facility] OR;  Service: Pain Management;  Laterality: N/A;    INJECTION OF ANESTHETIC AGENT AROUND MEDIAL BRANCH NERVES INNERVATING LUMBAR FACET JOINT Bilateral 09/25/2020    Procedure: Block-nerve-medial branch-lumbar L3, L4, L5;  Surgeon: Juan Carlos Romeo MD;  Location: [facility] OR;  Service: Pain Management;  Laterality: Bilateral;    INJECTION OF ANESTHETIC AGENT AROUND MEDIAL BRANCH NERVES INNERVATING LUMBAR FACET JOINT Bilateral 10/20/2020    Procedure: Block-nerve-medial branch-lumbar, L3, 4, 5;  Surgeon: Juan Carlos Romeo MD;  Location: [facility] OR;  Service: Pain Management;  Laterality: Bilateral;    JOINT REPLACEMENT  2007    C7    LARYNGOSCOPY Bilateral 11/02/2018    Procedure: Sleep endoscopy;  Surgeon: Brandin Rosen MD;  Location: [facility] OR;  Service: ENT;  Laterality: Bilateral;    MYOTOMY AND SUSPENSION OF HYOID Bilateral 09/27/2018    Procedure: Hyoid suspension;  Surgeon: Brandin Rosen MD;  Location: [facility] OR;  Service: ENT;  Laterality: Bilateral;    RADIOFREQUENCY ABLATION Right 02/05/2021    Procedure: RADIOFREQUENCY ABLATION RIGHT L3,4.5 1 of 2;  Surgeon: Seamus Sotelo MD;  Location: [facility] PAIN MGT;  Service: Pain Management;  Laterality: Right;    RADIOFREQUENCY ABLATION Left 02/19/2021    Procedure: RADIOFREQUENCY ABLATION LEFT L3,4,5 2 of 2;  Surgeon: Seamus Sotelo MD;  Location: [facility] PAIN MGT;  Service: Pain Management;  Laterality: Left;    RADIOFREQUENCY ABLATION Left 02/10/2022    Procedure: Radiofrequency Ablation LEFT L3,4,5;  Surgeon: Seamus Sotelo MD;  Location: [facility] PAIN MGT;   Service: Pain Management;  Laterality: Left;    RADIOFREQUENCY ABLATION Right 02/17/2022    Procedure: Radiofrequency Ablation RIGHT L3,4,5 SCHEDULE 1 WEEK AFTER LEFT SIDE PER JENNIFER VERGARA;  Surgeon: Seamus Sotelo MD;  Location: [facility] PAIN MGT;  Service: Pain Management;  Laterality: Right;    RADIOFREQUENCY ABLATION Left 09/14/2023    Procedure: RADIOFREQUENCY ABLATION, LEFT L3-L4 AND L5 MEDIAL BRANCH ONE OF TWO;  Surgeon: Seamus Sotelo MD;  Location: [facility] PAIN MGT;  Service: Pain Management;  Laterality: Left;    RADIOFREQUENCY ABLATION Right 09/28/2023    Procedure: RADIOFREQUENCY ABLATION, RIGHT L3-L4 AND L5 MEDIAL BRANCH TWO OF TWO;  Surgeon: Seamus Sotelo MD;  Location: [facility] PAIN MGT;  Service: Pain Management;  Laterality: Right;    SPINE SURGERY  2008    Neck fusion C6/7??    VASECTOMY  2003        Family History   Problem Relation Name Age of Onset    Hypertension Paternal Grandfather Kilo     Diabetes Neg Hx      Cancer Neg Hx      Glaucoma Neg Hx      Macular degeneration Neg Hx      Retinal detachment Neg Hx         Social History[3]    Review of patient's allergies indicates:  No Known Allergies     Health Maintenance Due   Topic Date Due    Hepatitis C Screening  Never done    HIV Screening  Never done    Pneumococcal Vaccines (Age 50+) (1 of 1 - PCV) Never done    COVID-19 Vaccine (4 - 2024-25 season) 09/01/2024    Shingles Vaccine (2 of 2) 04/20/2025       Objective   Vitals:    05/22/25 0855   BP: 124/86   Pulse:         Physical Exam  Constitutional:       General: He is not in acute distress.     Appearance: Normal appearance. He is well-developed.   HENT:      Head: Normocephalic and atraumatic.      Right Ear: External ear normal.      Left Ear: External ear normal.   Eyes:      Conjunctiva/sclera: Conjunctivae normal.   Pulmonary:      Effort: Pulmonary effort is normal. No respiratory distress.   Abdominal:      General: Abdomen is flat. There is no distension.    Musculoskeletal:         General: No swelling or deformity.      Right lower leg: No edema.      Left lower leg: No edema.   Skin:     General: Skin is warm and dry.      Coloration: Skin is not jaundiced.   Neurological:      Mental Status: He is alert and oriented to person, place, and time. Mental status is at baseline.   Psychiatric:         Attention and Perception: Attention and perception normal.         Mood and Affect: Mood normal.         Speech: Speech normal.         Behavior: Behavior normal. Behavior is cooperative.         Thought Content: Thought content normal.         Cognition and Memory: Cognition normal.         Judgment: Judgment normal.          Assessment & Plan    1. Healthcare maintenance    2. PTSD (post-traumatic stress disorder)  Assessment & Plan:  I started duloxetine at his last visit to help both with neuropathic pain and anxiety/PTSD. The patient feels it helps some with anxiety when it's in his system but it won't last the whole day. After discussing options, we've decided to try doubling the amitriptyline (10mg in the evening helps his back, so we will try 10mg twice a day for relief during the day). Taper duloxetine (see After Visit Summary).      3. Lumbar radiculopathy  Assessment & Plan:  See plan for PTSD.      4. Cervical radiculopathy  Assessment & Plan:  See plan for PTSD.      5. Hypertriglyceridemia  Assessment & Plan:  Recent blood work showed a triglyceride level of 400 - otherwise, cholesterol levels look great. Prior triglyceride levels were normal. Patient admitted he had a steak before his lab work. No treatment needed at this time. Continue to monitor.      6. Erectile dysfunction, unspecified erectile dysfunction type  Assessment & Plan:  Patient asked, last minute, about erectile dysfunction. Previously on sildenafil, as high as 100mg. I'm sending a new script. If his erectile dysfunction doesn't improve as we wean the Cymbalta, I would like to discuss risk  factors for cardiovascular disease at his next visit.    Orders:  -     sildenafiL (VIAGRA) 50 MG tablet; Take 1 tablet (50 mg total) by mouth daily as needed for Erectile Dysfunction.  Dispense: 12 tablet; Refill: 11         Follow up in 6 months.     Justice Waite MD  05/22/2025          [1]   Patient Active Problem List  Diagnosis    Rectal bleeding    ROMA (obstructive sleep apnea)    Lumbar radiculopathy    Lumbar spondylosis    Chronic pain    Cervical radiculopathy    DDD (degenerative disc disease), cervical    DDD (degenerative disc disease), lumbar    Gout    Gastroesophageal reflux disease    PTSD (post-traumatic stress disorder)    Erectile dysfunction    Hypertriglyceridemia   [2]   Current Outpatient Medications on File Prior to Visit   Medication Sig Dispense Refill    allopurinoL (ZYLOPRIM) 100 MG tablet 100 mg.      amitriptyline (ELAVIL) 10 MG tablet Take 1 tablet (10 mg total) by mouth nightly as needed for Insomnia. 90 tablet 3    cholecalciferol, vitamin D3, 100 mcg (4,000 unit) Tab 10 mcg.      cyclobenzaprine (FLEXERIL) 10 MG tablet Take 1 tablet (10 mg total) by mouth daily as needed for Muscle spasms. 90 tablet 3    DULoxetine (CYMBALTA) 60 MG capsule Take 1 capsule (60 mg total) by mouth once daily. 90 capsule 3    lidocaine (LIDODERM) 5 % Place 1 patch onto the skin once daily. Remove & Discard patch within 12 hours or as directed by MD 30 patch 3    loratadine (CLARITIN) 10 mg tablet Take 10 mg by mouth once daily.      multivitamin capsule Take 1 capsule by mouth once daily.      zolpidem (AMBIEN) 5 MG Tab Take 1 tablet (5 mg total) by mouth nightly as needed (insomnia). 30 tablet 0     Current Facility-Administered Medications on File Prior to Visit   Medication Dose Route Frequency Provider Last Rate Last Admin    0.9%  NaCl infusion  500 mL Intravenous Continuous Nagi Chambers MD        lactated ringers infusion   Intravenous Continuous Jaime Valdez MD   Stopped at  11/02/18 0906   [3]   Social History  Socioeconomic History    Marital status:    Occupational History    Occupation: [employer]   Tobacco Use    Smoking status: Former     Current packs/day: 0.50     Types: Vaping with nicotine, Cigarettes    Smokeless tobacco: Former   Vaping Use    Vaping status: Every Day   Substance and Sexual Activity    Alcohol use: Yes     Alcohol/week: 12.0 standard drinks of alcohol     Types: 12 Cans of beer per week     Comment: weekly    Drug use: No    Sexual activity: Yes     Partners: Female     Birth control/protection: Partner-Vasectomy   Social History Narrative    Currently . 2 children. Work in [employer].      Social Drivers of Health     Financial Resource Strain: Low Risk  (4/14/2025)    Overall Financial Resource Strain (CARDIA)     Difficulty of Paying Living Expenses: Not hard at all   Food Insecurity: Food Insecurity Present (4/14/2025)    Hunger Vital Sign     Worried About Running Out of Food in the Last Year: Sometimes true     Ran Out of Food in the Last Year: Never true   Transportation Needs: No Transportation Needs (4/14/2025)    PRAPARE - Transportation     Lack of Transportation (Medical): No     Lack of Transportation (Non-Medical): No   Physical Activity: Insufficiently Active (4/14/2025)    Exercise Vital Sign     Days of Exercise per Week: 4 days     Minutes of Exercise per Session: 30 min   Stress: Stress Concern Present (4/14/2025)    [employer] [place] [employer] - Occupational Stress Questionnaire     Feeling of Stress : Very much   Housing Stability: Low Risk  (4/14/2025)    Housing Stability Vital Sign     Unable to Pay for Housing in the Last Year: No     Number of Times Moved in the Last Year: 0     Homeless in the Last Year: No

## 2025-05-22 NOTE — ASSESSMENT & PLAN NOTE
Patient asked, last minute, about erectile dysfunction. Previously on sildenafil, as high as 100mg. I'm sending a new script. If his erectile dysfunction doesn't improve as we wean the Cymbalta, I would like to discuss risk factors for cardiovascular disease at his next visit.

## 2025-05-22 NOTE — ASSESSMENT & PLAN NOTE
Recent blood work showed a triglyceride level of 400 - otherwise, cholesterol levels look great. Prior triglyceride levels were normal. Patient admitted he had a steak before his lab work. No treatment needed at this time. Continue to monitor.

## 2025-05-22 NOTE — PATIENT INSTRUCTIONS
Take Cymbalta 60mg every other day for two weeks to taper off the medication.  Take amitriptyline once every 12 hours as needed.

## 2025-05-22 NOTE — ASSESSMENT & PLAN NOTE
I started duloxetine at his last visit to help both with neuropathic pain and anxiety/PTSD. The patient feels it helps some with anxiety when it's in his system but it won't last the whole day. After discussing options, we've decided to try doubling the amitriptyline (10mg in the evening helps his back, so we will try 10mg twice a day for relief during the day). Taper duloxetine (see After Visit Summary).

## 2025-06-25 DIAGNOSIS — F51.05 INSOMNIA DUE TO OTHER MENTAL DISORDER: ICD-10-CM

## 2025-06-25 DIAGNOSIS — F99 INSOMNIA DUE TO OTHER MENTAL DISORDER: ICD-10-CM

## 2025-06-25 NOTE — TELEPHONE ENCOUNTER
No care due was identified.  [facility] Embedded Care Due Messages. Reference number: [id number].   6/25/2025 5:03:42 PM CDT

## 2025-06-26 RX ORDER — ZOLPIDEM TARTRATE 5 MG/1
5 TABLET ORAL NIGHTLY PRN
Qty: 30 TABLET | Refills: 0 | Status: SHIPPED | OUTPATIENT
Start: 2025-06-26

## 2025-07-18 ENCOUNTER — PATIENT MESSAGE (OUTPATIENT)
Dept: FAMILY MEDICINE | Facility: CLINIC | Age: 55
End: 2025-07-18
Payer: OTHER GOVERNMENT

## 2025-07-18 ENCOUNTER — TELEPHONE (OUTPATIENT)
Dept: FAMILY MEDICINE | Facility: CLINIC | Age: 55
End: 2025-07-18
Payer: OTHER GOVERNMENT

## 2025-07-18 NOTE — TELEPHONE ENCOUNTER
Copied from CRM #[id number]. Topic: Appointments - Appointment Access  >> Jul 18, 2025 11:22 AM Samina wrote:  Type:  Sooner Apoointment Request    Caller is requesting a sooner appointment.      Name of Caller:Patient at [phone]    Additional Information: Patient needs to be seen sooner by PCP, patient needs forms filled out before 7/25, please call and advise. Thank you.

## 2025-07-23 ENCOUNTER — TELEPHONE (OUTPATIENT)
Dept: FAMILY MEDICINE | Facility: CLINIC | Age: 55
End: 2025-07-23
Payer: OTHER GOVERNMENT

## 2025-07-23 ENCOUNTER — PATIENT MESSAGE (OUTPATIENT)
Dept: FAMILY MEDICINE | Facility: CLINIC | Age: 55
End: 2025-07-23
Payer: OTHER GOVERNMENT

## 2025-07-23 NOTE — TELEPHONE ENCOUNTER
Pt had appointment on 7- but looks like pt cxd that appointment.     Would you like to see pt to complete this paperwork?

## 2025-07-25 ENCOUNTER — TELEPHONE (OUTPATIENT)
Dept: FAMILY MEDICINE | Facility: CLINIC | Age: 55
End: 2025-07-25
Payer: OTHER GOVERNMENT

## 2025-07-25 NOTE — TELEPHONE ENCOUNTER
----- Message from Effie sent at 7/25/2025  2:22 PM CDT -----  Regarding: Fax #  Pt would like the paperwork that he just picked up to be faxed to : [phone] ATTN:  Cate Herrera.    Thank you...

## 2025-07-28 ENCOUNTER — TELEPHONE (OUTPATIENT)
Dept: FAMILY MEDICINE | Facility: CLINIC | Age: 55
End: 2025-07-28
Payer: OTHER GOVERNMENT

## 2025-07-28 NOTE — TELEPHONE ENCOUNTER
Copied from CRM #[id number]. Topic: General Inquiry - Return Call  >> Jul 25, 2025  1:28 PM Felipa wrote:  Patient is returning a phone call.    Who left a message for the patient: Reilly Link MA    Does patient know what this is regarding:      Would you like a call back, or a response through your [facility] portal?:   Call Back Please. Thank you    Best call back number:  Patient    Comments:

## 2025-08-07 ENCOUNTER — HOSPITAL ENCOUNTER (OUTPATIENT)
Dept: RADIOLOGY | Facility: HOSPITAL | Age: 55
Discharge: HOME | End: 2025-08-07
Attending: STUDENT IN AN ORGANIZED HEALTH CARE EDUCATION/TRAINING PROGRAM
Payer: OTHER GOVERNMENT

## 2025-08-07 ENCOUNTER — OFFICE VISIT (OUTPATIENT)
Dept: FAMILY MEDICINE | Facility: CLINIC | Age: 55
End: 2025-08-07
Payer: OTHER GOVERNMENT

## 2025-08-07 VITALS
HEART RATE: 78 BPM | WEIGHT: 216.69 LBS | BODY MASS INDEX: 29.35 KG/M2 | DIASTOLIC BLOOD PRESSURE: 80 MMHG | SYSTOLIC BLOOD PRESSURE: 122 MMHG | RESPIRATION RATE: 18 BRPM | HEIGHT: 72 IN | OXYGEN SATURATION: 97 %

## 2025-08-07 DIAGNOSIS — R10.32 LLQ PAIN: Primary | ICD-10-CM

## 2025-08-07 DIAGNOSIS — R10.32 LLQ PAIN: ICD-10-CM

## 2025-08-07 PROCEDURE — 74018 RADEX ABDOMEN 1 VIEW: CPT | Mod: 26,,, | Performed by: RADIOLOGY

## 2025-08-07 PROCEDURE — 74018 RADEX ABDOMEN 1 VIEW: CPT | Mod: TC,PO

## 2025-08-07 PROCEDURE — 99213 OFFICE O/P EST LOW 20 MIN: CPT | Mod: PBBFAC,25,PO | Performed by: STUDENT IN AN ORGANIZED HEALTH CARE EDUCATION/TRAINING PROGRAM

## 2025-08-07 PROCEDURE — 99213 OFFICE O/P EST LOW 20 MIN: CPT | Mod: S$PBB,,, | Performed by: STUDENT IN AN ORGANIZED HEALTH CARE EDUCATION/TRAINING PROGRAM

## 2025-08-07 PROCEDURE — 99999 PR PBB SHADOW E&M-EST. PATIENT-LVL III: CPT | Mod: PBBFAC,,, | Performed by: STUDENT IN AN ORGANIZED HEALTH CARE EDUCATION/TRAINING PROGRAM

## 2025-08-07 RX ORDER — SERTRALINE HYDROCHLORIDE 100 MG/1
150 TABLET, FILM COATED ORAL DAILY
COMMUNITY

## 2025-08-07 NOTE — PROGRESS NOTES
Name: Raciel White  MRN: [id number]  : 1970  PCP: Justice Waite MD    Subjective   Patient presents with LLQ abdominal pain present for 5 days. Tender to touch. Worse with position changes such as standing up. No response to Miralax.     Review of Systems   Constitutional:  Negative for chills and fever.   Cardiovascular:  Negative for palpitations and leg swelling.   Gastrointestinal:  Positive for abdominal pain (LLQ). Negative for blood in stool, constipation, diarrhea, nausea and vomiting.   Genitourinary:  Negative for hematuria.   Neurological:  Negative for dizziness.       Problem List[1]    Health Maintenance Due   Topic Date Due    Hepatitis C Screening  Never done    HIV Screening  Never done    Pneumococcal Vaccines (Age 50+) (1 of 1 - PCV) Never done    COVID-19 Vaccine (4 -  season) 2024    Shingles Vaccine (2 of 2) 2025       Objective   Vitals:    25 1426   BP: 122/80   Pulse: 78   Resp: 18     Wt Readings from Last 2 Encounters:   25 98.3 kg (216 lb 11.4 oz)   25 99.7 kg (219 lb 14.5 oz)   ]  Body mass index is 29.39 kg/m².     Physical Exam  Constitutional:       General: He is not in acute distress.     Appearance: Normal appearance. He is well-developed.   HENT:      Head: Normocephalic and atraumatic.      Right Ear: External ear normal.      Left Ear: External ear normal.   Eyes:      Conjunctiva/sclera: Conjunctivae normal.   Pulmonary:      Effort: Pulmonary effort is normal. No respiratory distress.   Abdominal:      General: Abdomen is flat. Bowel sounds are normal. There is no distension.      Palpations: There is no hepatomegaly.      Tenderness: There is abdominal tenderness in the suprapubic area and left lower quadrant. There is no rebound.   Musculoskeletal:         General: No swelling or deformity.      Right lower leg: No edema.      Left lower leg: No edema.   Skin:     General: Skin is warm and dry.      Coloration: Skin is not  jaundiced.   Neurological:      Mental Status: He is alert and oriented to person, place, and time. Mental status is at baseline.   Psychiatric:         Attention and Perception: Attention and perception normal.         Mood and Affect: Mood normal.         Speech: Speech normal.         Behavior: Behavior normal. Behavior is cooperative.         Thought Content: Thought content normal.         Cognition and Memory: Cognition normal.         Judgment: Judgment normal.         Assessment & Plan    1. LLQ pain  -     X-Ray Abdomen AP 1 View; Future; Expected date: 08/07/2025  -     CBC Auto Differential; Future; Expected date: 08/07/2025  -     C-Reactive Protein; Future; Expected date: 08/07/2025  -     Sedimentation rate; Future; Expected date: 08/07/2025  -     Urinalysis, Reflex to Urine Culture Urine, Clean Catch; Future; Expected date: 08/07/2025    Colitis vs constipation vs some sort of bladder pathology. Advised to take NSAIDs as needed. Further workup pending results.      Follow up as needed.    Justice Waite MD  08/07/2025         [1]   Patient Active Problem List  Diagnosis    Rectal bleeding    ROMA (obstructive sleep apnea)    Lumbar radiculopathy    Lumbar spondylosis    Chronic pain    Cervical radiculopathy    DDD (degenerative disc disease), cervical    DDD (degenerative disc disease), lumbar    Gout    Gastroesophageal reflux disease    PTSD (post-traumatic stress disorder)    Erectile dysfunction    Hypertriglyceridemia

## (undated) DEVICE — MARKER SKIN STND TIP BLUE BARR

## (undated) DEVICE — APPLICATOR CHLORAPREP CLR 10.5

## (undated) DEVICE — SYR GLASS 5CC LUER LOK

## (undated) DEVICE — SEE MEDLINE ITEM 157125

## (undated) DEVICE — SEE MEDLINE ITEM 152622

## (undated) DEVICE — GLOVE SURGICAL LATEX SZ 7

## (undated) DEVICE — NDL 18GA X1 1/2 REG BEVEL

## (undated) DEVICE — TRAY NERVE BLOCK

## (undated) DEVICE — SEE MEDLINE ITEM 157128

## (undated) DEVICE — DRESSING LEUKOPLAST FLEX 1X3IN

## (undated) DEVICE — NDL SPINAL SPINOCAN 22GX3.5

## (undated) DEVICE — NDL TUOHY EPIDURAL 20G X 3.5

## (undated) DEVICE — BANDAGE ADHESIVE

## (undated) DEVICE — SEE MEDLINE ITEM 152487

## (undated) DEVICE — SOL 0.9% NACL IRRI.IN STERIL

## (undated) DEVICE — KIT ANTIFOG

## (undated) DEVICE — SPONGE GAUZE 16PLY 4X4